# Patient Record
Sex: MALE | Race: WHITE | NOT HISPANIC OR LATINO
[De-identification: names, ages, dates, MRNs, and addresses within clinical notes are randomized per-mention and may not be internally consistent; named-entity substitution may affect disease eponyms.]

---

## 2017-10-04 ENCOUNTER — APPOINTMENT (OUTPATIENT)
Dept: DERMATOLOGY | Facility: CLINIC | Age: 82
End: 2017-10-04
Payer: MEDICARE

## 2017-10-04 PROBLEM — Z00.00 ENCOUNTER FOR PREVENTIVE HEALTH EXAMINATION: Status: ACTIVE | Noted: 2017-10-04

## 2017-10-04 PROCEDURE — 99213 OFFICE O/P EST LOW 20 MIN: CPT

## 2019-08-09 ENCOUNTER — EMERGENCY (EMERGENCY)
Facility: HOSPITAL | Age: 84
LOS: 1 days | Discharge: HOME | End: 2019-08-09
Attending: EMERGENCY MEDICINE
Payer: MEDICARE

## 2019-08-09 VITALS
HEART RATE: 59 BPM | WEIGHT: 192.9 LBS | OXYGEN SATURATION: 97 % | TEMPERATURE: 99 F | RESPIRATION RATE: 18 BRPM | HEIGHT: 69 IN

## 2019-08-09 DIAGNOSIS — Z95.1 PRESENCE OF AORTOCORONARY BYPASS GRAFT: Chronic | ICD-10-CM

## 2019-08-09 LAB
ALBUMIN SERPL ELPH-MCNC: 4.1 G/DL — SIGNIFICANT CHANGE UP (ref 3.3–5.2)
ALP SERPL-CCNC: 63 U/L — SIGNIFICANT CHANGE UP (ref 40–120)
ALT FLD-CCNC: 11 U/L — SIGNIFICANT CHANGE UP
ANION GAP SERPL CALC-SCNC: 14 MMOL/L — SIGNIFICANT CHANGE UP (ref 5–17)
APTT BLD: 29.4 SEC — SIGNIFICANT CHANGE UP (ref 27.5–36.3)
AST SERPL-CCNC: 20 U/L — SIGNIFICANT CHANGE UP
BASOPHILS # BLD AUTO: 0.08 K/UL — SIGNIFICANT CHANGE UP (ref 0–0.2)
BASOPHILS NFR BLD AUTO: 1 % — SIGNIFICANT CHANGE UP (ref 0–2)
BILIRUB SERPL-MCNC: 0.4 MG/DL — SIGNIFICANT CHANGE UP (ref 0.4–2)
BUN SERPL-MCNC: 24 MG/DL — HIGH (ref 8–20)
CALCIUM SERPL-MCNC: 9.1 MG/DL — SIGNIFICANT CHANGE UP (ref 8.6–10.2)
CHLORIDE SERPL-SCNC: 112 MMOL/L — HIGH (ref 98–107)
CO2 SERPL-SCNC: 22 MMOL/L — SIGNIFICANT CHANGE UP (ref 22–29)
CREAT SERPL-MCNC: 1.97 MG/DL — HIGH (ref 0.5–1.3)
EOSINOPHIL # BLD AUTO: 0.2 K/UL — SIGNIFICANT CHANGE UP (ref 0–0.5)
EOSINOPHIL NFR BLD AUTO: 2.4 % — SIGNIFICANT CHANGE UP (ref 0–6)
GLUCOSE SERPL-MCNC: 88 MG/DL — SIGNIFICANT CHANGE UP (ref 70–115)
HCT VFR BLD CALC: 40.6 % — SIGNIFICANT CHANGE UP (ref 39–50)
HGB BLD-MCNC: 13.2 G/DL — SIGNIFICANT CHANGE UP (ref 13–17)
IMM GRANULOCYTES NFR BLD AUTO: 0.5 % — SIGNIFICANT CHANGE UP (ref 0–1.5)
INR BLD: 0.97 RATIO — SIGNIFICANT CHANGE UP (ref 0.88–1.16)
LYMPHOCYTES # BLD AUTO: 1.58 K/UL — SIGNIFICANT CHANGE UP (ref 1–3.3)
LYMPHOCYTES # BLD AUTO: 19.2 % — SIGNIFICANT CHANGE UP (ref 13–44)
MAGNESIUM SERPL-MCNC: 2.2 MG/DL — SIGNIFICANT CHANGE UP (ref 1.6–2.6)
MCHC RBC-ENTMCNC: 30.7 PG — SIGNIFICANT CHANGE UP (ref 27–34)
MCHC RBC-ENTMCNC: 32.5 GM/DL — SIGNIFICANT CHANGE UP (ref 32–36)
MCV RBC AUTO: 94.4 FL — SIGNIFICANT CHANGE UP (ref 80–100)
MONOCYTES # BLD AUTO: 0.92 K/UL — HIGH (ref 0–0.9)
MONOCYTES NFR BLD AUTO: 11.2 % — SIGNIFICANT CHANGE UP (ref 2–14)
NEUTROPHILS # BLD AUTO: 5.4 K/UL — SIGNIFICANT CHANGE UP (ref 1.8–7.4)
NEUTROPHILS NFR BLD AUTO: 65.7 % — SIGNIFICANT CHANGE UP (ref 43–77)
OB PNL STL: NEGATIVE — SIGNIFICANT CHANGE UP
PHOSPHATE SERPL-MCNC: 3.5 MG/DL — SIGNIFICANT CHANGE UP (ref 2.4–4.7)
PLATELET # BLD AUTO: 209 K/UL — SIGNIFICANT CHANGE UP (ref 150–400)
POTASSIUM SERPL-MCNC: 4.7 MMOL/L — SIGNIFICANT CHANGE UP (ref 3.5–5.3)
POTASSIUM SERPL-SCNC: 4.7 MMOL/L — SIGNIFICANT CHANGE UP (ref 3.5–5.3)
PROT SERPL-MCNC: 6.6 G/DL — SIGNIFICANT CHANGE UP (ref 6.6–8.7)
PROTHROM AB SERPL-ACNC: 11.1 SEC — SIGNIFICANT CHANGE UP (ref 10–12.9)
RBC # BLD: 4.3 M/UL — SIGNIFICANT CHANGE UP (ref 4.2–5.8)
RBC # FLD: 13.4 % — SIGNIFICANT CHANGE UP (ref 10.3–14.5)
SODIUM SERPL-SCNC: 148 MMOL/L — HIGH (ref 135–145)
TROPONIN T SERPL-MCNC: <0.01 NG/ML — SIGNIFICANT CHANGE UP (ref 0–0.06)
WBC # BLD: 8.22 K/UL — SIGNIFICANT CHANGE UP (ref 3.8–10.5)
WBC # FLD AUTO: 8.22 K/UL — SIGNIFICANT CHANGE UP (ref 3.8–10.5)

## 2019-08-09 PROCEDURE — 70547 MR ANGIOGRAPHY NECK W/O DYE: CPT | Mod: 26

## 2019-08-09 PROCEDURE — 71046 X-RAY EXAM CHEST 2 VIEWS: CPT | Mod: 26

## 2019-08-09 PROCEDURE — 93010 ELECTROCARDIOGRAM REPORT: CPT

## 2019-08-09 PROCEDURE — 70544 MR ANGIOGRAPHY HEAD W/O DYE: CPT | Mod: 26,59

## 2019-08-09 PROCEDURE — 99218: CPT

## 2019-08-09 PROCEDURE — 70551 MRI BRAIN STEM W/O DYE: CPT | Mod: 26

## 2019-08-09 PROCEDURE — 93306 TTE W/DOPPLER COMPLETE: CPT | Mod: 26

## 2019-08-09 PROCEDURE — 70450 CT HEAD/BRAIN W/O DYE: CPT | Mod: 26

## 2019-08-09 RX ORDER — ATENOLOL 25 MG/1
25 TABLET ORAL
Refills: 0 | Status: DISCONTINUED | OUTPATIENT
Start: 2019-08-09 | End: 2019-08-15

## 2019-08-09 RX ORDER — ATORVASTATIN CALCIUM 80 MG/1
10 TABLET, FILM COATED ORAL AT BEDTIME
Refills: 0 | Status: DISCONTINUED | OUTPATIENT
Start: 2019-08-09 | End: 2019-08-15

## 2019-08-09 RX ORDER — MECLIZINE HCL 12.5 MG
25 TABLET ORAL ONCE
Refills: 0 | Status: COMPLETED | OUTPATIENT
Start: 2019-08-09 | End: 2019-08-09

## 2019-08-09 RX ORDER — LEVOTHYROXINE SODIUM 125 MCG
25 TABLET ORAL ONCE
Refills: 0 | Status: COMPLETED | OUTPATIENT
Start: 2019-08-09 | End: 2019-08-10

## 2019-08-09 RX ORDER — GLIMEPIRIDE 1 MG
4 TABLET ORAL
Refills: 0 | Status: DISCONTINUED | OUTPATIENT
Start: 2019-08-10 | End: 2019-08-15

## 2019-08-09 RX ORDER — ATENOLOL 25 MG/1
50 TABLET ORAL ONCE
Refills: 0 | Status: DISCONTINUED | OUTPATIENT
Start: 2019-08-09 | End: 2019-08-09

## 2019-08-09 RX ORDER — ASPIRIN/CALCIUM CARB/MAGNESIUM 324 MG
81 TABLET ORAL DAILY
Refills: 0 | Status: DISCONTINUED | OUTPATIENT
Start: 2019-08-10 | End: 2019-08-15

## 2019-08-09 RX ADMIN — Medication 25 MILLIGRAM(S): at 17:44

## 2019-08-09 RX ADMIN — ATORVASTATIN CALCIUM 10 MILLIGRAM(S): 80 TABLET, FILM COATED ORAL at 22:15

## 2019-08-09 NOTE — ED CDU PROVIDER INITIAL DAY NOTE - OBJECTIVE STATEMENT
83 y/o M with CAD (s/p CABG), HLD, hypothyroid and CKD (stage 3) presents complaining of dizziness. Pt reports upon waking up this morning at 6:30 am he felt dizzy immediately upon standing and states it has not resolved and he has not improved as the day progressed. He has had similar episodes but much more mild, lasting seconds, that resolve upon washing his face. He does not describe vertigo, but states that he feels unsteady on his feet and feels that he needs to hold on to things while he ambulates today. He also feels "foggy" in his head and feels that his vision is not clear today. Pt denies fevers, chills, nausea, vomiting, abdominal pain, but he does note that his stool today and yesterday was a little dark in color, but has a history of hemorrhoids, and does note some recent bleeding due to hemorrhoids. He has an extensive cardiac history, but does not follow with a cardiologist. Reports dizziness feels better currently, able to ambulate with steady gait. Per son, patient has a history of vertigo and was working outside in the yard for many hours yesterday which he believes may have  triggered today's symptoms.  PMD: Andres

## 2019-08-09 NOTE — ED PROVIDER NOTE - OBJECTIVE STATEMENT
Patient presents complaining of dizziness upon waking up this morning at 6:30 am this morning and felt dizzy immediately upon standing and states it has not resolved and he has not improved as the day progressed. He has had similar episodes but much more mild, lasting seconds, that resolve upon washing his face. He does not describe vertigo, but states that he feels unsteady on his feet and feels that he needs to hold on to things while he ambulates today. He also feels "foggy" in his head and feels that his vision is not clear today. He denies fevers, chills, nausea, vomiting, abdominal pain, but he does note that his stool today and yesterday was a little dark in color, but has a history of hemorrhoids, and does note some recent bleeding due to hemorrhoids. He has an extensive cardiac history, but does not follow with a cardiologist.  Per son, patient has a history of vertigo and was working outside in the yard for many hours yesterday which he believes may have  triggered today's symptoms.  PMD: Andres

## 2019-08-09 NOTE — ED PROVIDER NOTE - PHYSICAL EXAMINATION
Const: Awake, alert and oriented. In no acute distress. Well appearing.  HEENT: NC/AT. Moist mucous membranes.  Eyes: No scleral icterus. EOMI.  Neck:. Soft and supple. Full ROM without pain.  Cardiac: Regular rate and regular rhythm. +S1/S2. No murmurs. Peripheral pulses 2+ and symmetric. No LE edema.  Resp: Speaking in full sentences. No evidence of respiratory distress. No wheezes, rales or rhonchi.  Abd: Soft, non-tender, non-distended. Normal bowel sounds in all 4 quadrants. No guarding or rebound.  Rectal: Normal external rectum without lesions. + collapsed, nonbleeding, non-thrombosed 12 o'clock hemorrhoid. Normal tone. No stool in rectal vault, trace bloody mucus. No internal hemorrhoids appreciated. Normal rectal tone.   Back: Spine midline and non-tender. No CVAT.  Skin: No rashes, abrasions or lacerations.  Neuro: CN II-XII grossly in tact. Symmetrical smile. PERRL. EOMI. Bilateral and symmetric sensation of face. Tongue midline. Normal finger to nose. Normal heel to shin. Normal rapid alternating movements. No pronator drift. Ambulates with ataxic gait, holding onto wall and stretcher while walking. Sensation symmetrically intact bilateral upper and lower extremities. Reflexes 2+ bilaterally.

## 2019-08-09 NOTE — ED CDU PROVIDER INITIAL DAY NOTE - MEDICAL DECISION MAKING DETAILS
Patient with PMH CAD, CABG, vertigo, DM, HTN presents complaining of dizziness, will OBS for TTE, MRI head, MRA head/neck, neuro checks

## 2019-08-09 NOTE — ED PROVIDER NOTE - PMH
CKD (chronic kidney disease) stage 3, GFR 30-59 ml/min    Coronary artery disease involving native heart without angina pectoris, unspecified vessel or lesion type    Hyperlipidemia, unspecified hyperlipidemia type    Hypothyroidism, unspecified type

## 2019-08-09 NOTE — ED PROVIDER NOTE - CLINICAL SUMMARY MEDICAL DECISION MAKING FREE TEXT BOX
Patient with PMH CAD, CABG, vertigo, DM, HTN presents complaining of dizziness since waking up this morning, described as inability to walk steadily. Per son, he worked a lot outside in the yard yesterday. On exam, neurologically intact, except when ambulating, in that he is holding onto walls when ambulating. Concern for posterior circulation CVA vs. vertigo, but will evaluate for GIB as patient notes dark stool today. If labs and CT are WNL, will plan to place in observation for MRI to rule out posterior circulation CVA.

## 2019-08-09 NOTE — ED PROVIDER NOTE - NS ED ROS FT
Const: Denies fever, chills  HEENT: + blurry vision. Denies sore throat  Neck: Denies neck pain/stiffness  Resp: Denies coughing, SOB  Cardiovascular: Denies CP, palpitations, LE edema  GI: + blood in stool. Denies nausea, vomiting, abdominal pain, diarrhea, constipation  : Denies urinary frequency/urgency/dysuria, hematuria  MSK: Denies back pain  Neuro: + dizziness. Denies HA, numbness, weakness  Skin: Denies rashes.

## 2019-08-09 NOTE — ED ADULT NURSE REASSESSMENT NOTE - NS ED NURSE REASSESS COMMENT FT1
Assumed pt care at 1930. Pt sitting upright in stretcher in no apparent signs of distress with brother at bedside. Pt status unchanged, refer to flowsheet and chart, pt safety maintained, pt hemodynamically stable, updated on plan of care. Awaiting MRI. Call light provided, fall safety reinforced. Will continue to monitor

## 2019-08-10 VITALS
SYSTOLIC BLOOD PRESSURE: 167 MMHG | RESPIRATION RATE: 16 BRPM | TEMPERATURE: 98 F | HEART RATE: 58 BPM | DIASTOLIC BLOOD PRESSURE: 76 MMHG | OXYGEN SATURATION: 99 %

## 2019-08-10 LAB
APPEARANCE UR: CLEAR — SIGNIFICANT CHANGE UP
BILIRUB UR-MCNC: NEGATIVE — SIGNIFICANT CHANGE UP
COLOR SPEC: YELLOW — SIGNIFICANT CHANGE UP
DIFF PNL FLD: NEGATIVE — SIGNIFICANT CHANGE UP
EPI CELLS # UR: SIGNIFICANT CHANGE UP
GLUCOSE BLDC GLUCOMTR-MCNC: 66 MG/DL — LOW (ref 70–99)
GLUCOSE BLDC GLUCOMTR-MCNC: 67 MG/DL — LOW (ref 70–99)
GLUCOSE BLDC GLUCOMTR-MCNC: 74 MG/DL — SIGNIFICANT CHANGE UP (ref 70–99)
GLUCOSE UR QL: NEGATIVE MG/DL — SIGNIFICANT CHANGE UP
KETONES UR-MCNC: NEGATIVE — SIGNIFICANT CHANGE UP
LEUKOCYTE ESTERASE UR-ACNC: NEGATIVE — SIGNIFICANT CHANGE UP
NITRITE UR-MCNC: NEGATIVE — SIGNIFICANT CHANGE UP
PH UR: 6 — SIGNIFICANT CHANGE UP (ref 5–8)
PROT UR-MCNC: 30 MG/DL
SP GR SPEC: 1.02 — SIGNIFICANT CHANGE UP (ref 1.01–1.02)
TROPONIN T SERPL-MCNC: <0.01 NG/ML — SIGNIFICANT CHANGE UP (ref 0–0.06)
UROBILINOGEN FLD QL: NEGATIVE MG/DL — SIGNIFICANT CHANGE UP
VIT B12 SERPL-MCNC: 670 PG/ML — SIGNIFICANT CHANGE UP (ref 232–1245)

## 2019-08-10 PROCEDURE — 99284 EMERGENCY DEPT VISIT MOD MDM: CPT | Mod: 25

## 2019-08-10 PROCEDURE — G0378: CPT

## 2019-08-10 PROCEDURE — 36415 COLL VENOUS BLD VENIPUNCTURE: CPT

## 2019-08-10 PROCEDURE — 86618 LYME DISEASE ANTIBODY: CPT

## 2019-08-10 PROCEDURE — 85610 PROTHROMBIN TIME: CPT

## 2019-08-10 PROCEDURE — 70544 MR ANGIOGRAPHY HEAD W/O DYE: CPT

## 2019-08-10 PROCEDURE — 93005 ELECTROCARDIOGRAM TRACING: CPT

## 2019-08-10 PROCEDURE — 82272 OCCULT BLD FECES 1-3 TESTS: CPT

## 2019-08-10 PROCEDURE — 82962 GLUCOSE BLOOD TEST: CPT

## 2019-08-10 PROCEDURE — 85027 COMPLETE CBC AUTOMATED: CPT

## 2019-08-10 PROCEDURE — 93306 TTE W/DOPPLER COMPLETE: CPT

## 2019-08-10 PROCEDURE — 83735 ASSAY OF MAGNESIUM: CPT

## 2019-08-10 PROCEDURE — 82607 VITAMIN B-12: CPT

## 2019-08-10 PROCEDURE — 70547 MR ANGIOGRAPHY NECK W/O DYE: CPT

## 2019-08-10 PROCEDURE — 80053 COMPREHEN METABOLIC PANEL: CPT

## 2019-08-10 PROCEDURE — 70450 CT HEAD/BRAIN W/O DYE: CPT

## 2019-08-10 PROCEDURE — 71046 X-RAY EXAM CHEST 2 VIEWS: CPT

## 2019-08-10 PROCEDURE — 84100 ASSAY OF PHOSPHORUS: CPT

## 2019-08-10 PROCEDURE — 99217: CPT

## 2019-08-10 PROCEDURE — 85730 THROMBOPLASTIN TIME PARTIAL: CPT

## 2019-08-10 PROCEDURE — 70551 MRI BRAIN STEM W/O DYE: CPT

## 2019-08-10 PROCEDURE — 84484 ASSAY OF TROPONIN QUANT: CPT

## 2019-08-10 PROCEDURE — 81001 URINALYSIS AUTO W/SCOPE: CPT

## 2019-08-10 RX ORDER — DEXTROSE 50 % IN WATER 50 %
15 SYRINGE (ML) INTRAVENOUS ONCE
Refills: 0 | Status: COMPLETED | OUTPATIENT
Start: 2019-08-10 | End: 2019-08-10

## 2019-08-10 RX ORDER — MECLIZINE HCL 12.5 MG
1 TABLET ORAL
Qty: 30 | Refills: 0
Start: 2019-08-10 | End: 2019-08-19

## 2019-08-10 RX ADMIN — Medication 15 GRAM(S): at 08:52

## 2019-08-10 RX ADMIN — Medication 25 MICROGRAM(S): at 05:00

## 2019-08-10 NOTE — ED CDU PROVIDER DISPOSITION NOTE - CLINICAL COURSE
85 y/o M with CAD (s/p CABG and 5 stents apporx 8 years ago- last saw cardiology approx 4 years ago), HLD, hypothyroid and CKD (stage 3) presents complaining of dizziness. Pt reports upon waking up this morning at 6:30 am he felt dizzy immediately upon standing. Pt does reports 6 month h/o of feeling light headed upon standing, denies syncope. Pt was admitted top observation for MRI/MRA and ECHO. Trop negative, MRI/MRA with no acute concerns, ECHO results noted. Will dc home with out-pt Cardiology f/u

## 2019-08-10 NOTE — ED CDU PROVIDER SUBSEQUENT DAY NOTE - PHYSICAL EXAMINATION
Const: Awake, alert and oriented. In no acute distress. Well appearing.  HEENT: NC/AT. Moist mucous membranes.  Eyes: No scleral icterus. EOMI.  Neck:. Soft and supple. Full ROM without pain.  Cardiac: Regular rate and regular rhythm. +S1/S2. No murmurs. Peripheral pulses 2+ and symmetric.  Resp: Speaking in full sentences. No evidence of respiratory distress. No wheezes, rales or rhonchi.  Abd: Soft, non-tender, non-distended. Normal bowel sounds in all 4 quadrants. No guarding or rebound.  Back: Spine midline and non-tender. No CVAT.  Skin: No rashes, abrasions or lacerations.  Neuro: CN II-XII grossly in tact. Symmetrical smile. PERRL. EOMI. Bilateral and symmetric sensation of face. Tongue midline. Normal finger to nose. Normal heel to shin. Normal rapid alternating movements. No pronator drift. Ambulates with ataxic gait, holding onto wall and stretcher while walking. Sensation symmetrically intact bilateral upper and lower extremities. Reflexes 2+ bilaterally. Const: Awake, alert and oriented. In no acute distress. Well appearing.  HEENT: NC/AT. Moist mucous membranes.  Eyes: No scleral icterus. EOMI.  Neck:. Soft and supple. Full ROM without pain.  Cardiac: Regular rate and regular rhythm. +S1/S2. No murmurs. Peripheral pulses 2+ and symmetric.  Resp: Speaking in full sentences. No evidence of respiratory distress. No wheezes, rales or rhonchi.  Abd: Soft, non-tender, non-distended. Normal bowel sounds in all 4 quadrants. No guarding or rebound.  Back: Spine midline and non-tender. No CVAT.  Skin: No rashes, abrasions or lacerations.  Neuro: CN II-XII grossly in tact. Symmetrical smile. PERRL. EOMI. Bilateral and symmetric sensation of face. Tongue midline. Normal finger to nose. Normal heel to shin. Normal rapid alternating movements. No pronator drift. No ataxic gait. Sensation symmetrically intact bilateral upper and lower extremities. Reflexes 2+ bilaterally.

## 2019-08-10 NOTE — ED CDU PROVIDER SUBSEQUENT DAY NOTE - MEDICAL DECISION MAKING DETAILS
Patient with PMH CAD, CABG, vertigo, DM, HTN presents complaining of dizziness, will OBS for TTE, MRI head, MRA head/neck, neuro checks
Dizziness upon standing- MRI neg. MRA head neck neg. Will do another trop and ECHO pending. If neg will need out cardio f/u. Pt also with noted + Rhomberg, HA and Ataxia- will check lymes and B12

## 2019-08-10 NOTE — ED CDU PROVIDER SUBSEQUENT DAY NOTE - HISTORY
83 y/o M with CAD (s/p CABG and 5 stents apporx 8 years ago- last saw crdiology approx 4 years ago), HLD, hypothyroid and CKD (stage 3) presents complaining of dizziness. Pt reports upon waking up this morning at 6:30 am he felt dizzy immediately upon standing. Pt does reports 6 month h/o of feeling light headed upon standing, denies syncope. Pt was admitted top observation for MRI/MRA and ECHO. Pt seen this morning and reports feeling better. NO dizziness while sitting in stretcher. Denies currently HA, but reports HA intermittently x 1 year. Denies joint pain, rashes, fevers, CP, palp or SOB

## 2019-08-10 NOTE — ED ADULT NURSE NOTE - CHPI ED NUR SYMPTOMS NEG
no fever/no vomiting/no loss of consciousness/no nausea/no numbness/no blurred vision/no change in level of consciousness/no confusion/no weakness

## 2019-08-10 NOTE — ED ADULT NURSE REASSESSMENT NOTE - NS ED NURSE REASSESS COMMENT FT1
pt resting comfortably in bed no acute distress noted, offers no complaints at this time. vitals wnl, denies any dizziness at this time aox3 moving all extremities equal bilaterally.

## 2019-08-11 LAB
B BURGDOR C6 AB SER-ACNC: NEGATIVE — SIGNIFICANT CHANGE UP
B BURGDOR IGG+IGM SER-ACNC: 0.14 INDEX — SIGNIFICANT CHANGE UP (ref 0.01–0.89)

## 2020-06-16 NOTE — ED CDU PROVIDER INITIAL DAY NOTE - ATTENDING CONTRIBUTION TO CARE
I have personally performed a face to face diagnostic evaluation on this patient. I have reviewed the ACP note and agree with the history, exam and plan of care, except as noted. I agree with the plan of care above. Care managed by ACP under my direction and I was available for consultation as need.

## 2020-10-08 PROBLEM — E78.5 HYPERLIPIDEMIA, UNSPECIFIED: Chronic | Status: ACTIVE | Noted: 2019-08-09

## 2020-10-08 PROBLEM — N18.3 CHRONIC KIDNEY DISEASE, STAGE 3 (MODERATE): Chronic | Status: ACTIVE | Noted: 2019-08-09

## 2020-10-08 PROBLEM — I25.10 ATHEROSCLEROTIC HEART DISEASE OF NATIVE CORONARY ARTERY WITHOUT ANGINA PECTORIS: Chronic | Status: ACTIVE | Noted: 2019-08-09

## 2020-10-08 PROBLEM — E03.9 HYPOTHYROIDISM, UNSPECIFIED: Chronic | Status: ACTIVE | Noted: 2019-08-09

## 2020-10-09 ENCOUNTER — APPOINTMENT (OUTPATIENT)
Dept: DERMATOLOGY | Facility: CLINIC | Age: 85
End: 2020-10-09
Payer: MEDICARE

## 2020-10-09 PROCEDURE — 99203 OFFICE O/P NEW LOW 30 MIN: CPT

## 2020-11-03 ENCOUNTER — EMERGENCY (EMERGENCY)
Facility: HOSPITAL | Age: 85
LOS: 1 days | Discharge: HOME | End: 2020-11-03
Attending: EMERGENCY MEDICINE
Payer: MEDICARE

## 2020-11-03 VITALS
HEART RATE: 66 BPM | DIASTOLIC BLOOD PRESSURE: 87 MMHG | RESPIRATION RATE: 18 BRPM | WEIGHT: 179.9 LBS | HEIGHT: 69 IN | TEMPERATURE: 98 F | OXYGEN SATURATION: 96 % | SYSTOLIC BLOOD PRESSURE: 173 MMHG

## 2020-11-03 DIAGNOSIS — Z95.1 PRESENCE OF AORTOCORONARY BYPASS GRAFT: Chronic | ICD-10-CM

## 2020-11-03 LAB
ALBUMIN SERPL ELPH-MCNC: 3.9 G/DL — SIGNIFICANT CHANGE UP (ref 3.3–5.2)
ALP SERPL-CCNC: 69 U/L — SIGNIFICANT CHANGE UP (ref 40–120)
ALT FLD-CCNC: 11 U/L — SIGNIFICANT CHANGE UP
AMPHET UR-MCNC: NEGATIVE — SIGNIFICANT CHANGE UP
ANION GAP SERPL CALC-SCNC: 13 MMOL/L — SIGNIFICANT CHANGE UP (ref 5–17)
APPEARANCE UR: CLEAR — SIGNIFICANT CHANGE UP
APTT BLD: 31.3 SEC — SIGNIFICANT CHANGE UP (ref 27.5–35.5)
AST SERPL-CCNC: 24 U/L — SIGNIFICANT CHANGE UP
BACTERIA # UR AUTO: ABNORMAL
BARBITURATES UR SCN-MCNC: NEGATIVE — SIGNIFICANT CHANGE UP
BASOPHILS # BLD AUTO: 0.07 K/UL — SIGNIFICANT CHANGE UP (ref 0–0.2)
BASOPHILS NFR BLD AUTO: 0.8 % — SIGNIFICANT CHANGE UP (ref 0–2)
BENZODIAZ UR-MCNC: NEGATIVE — SIGNIFICANT CHANGE UP
BILIRUB SERPL-MCNC: 0.6 MG/DL — SIGNIFICANT CHANGE UP (ref 0.4–2)
BILIRUB UR-MCNC: NEGATIVE — SIGNIFICANT CHANGE UP
BUN SERPL-MCNC: 29 MG/DL — HIGH (ref 8–20)
CALCIUM SERPL-MCNC: 9.4 MG/DL — SIGNIFICANT CHANGE UP (ref 8.6–10.2)
CHLORIDE SERPL-SCNC: 105 MMOL/L — SIGNIFICANT CHANGE UP (ref 98–107)
CO2 SERPL-SCNC: 23 MMOL/L — SIGNIFICANT CHANGE UP (ref 22–29)
COCAINE METAB.OTHER UR-MCNC: NEGATIVE — SIGNIFICANT CHANGE UP
COLOR SPEC: YELLOW — SIGNIFICANT CHANGE UP
CREAT SERPL-MCNC: 2.18 MG/DL — HIGH (ref 0.5–1.3)
DIFF PNL FLD: NEGATIVE — SIGNIFICANT CHANGE UP
EOSINOPHIL # BLD AUTO: 0.24 K/UL — SIGNIFICANT CHANGE UP (ref 0–0.5)
EOSINOPHIL NFR BLD AUTO: 2.7 % — SIGNIFICANT CHANGE UP (ref 0–6)
EPI CELLS # UR: SIGNIFICANT CHANGE UP
GLUCOSE BLDC GLUCOMTR-MCNC: 75 MG/DL — SIGNIFICANT CHANGE UP (ref 70–99)
GLUCOSE BLDC GLUCOMTR-MCNC: 89 MG/DL — SIGNIFICANT CHANGE UP (ref 70–99)
GLUCOSE SERPL-MCNC: 81 MG/DL — SIGNIFICANT CHANGE UP (ref 70–99)
GLUCOSE UR QL: NEGATIVE MG/DL — SIGNIFICANT CHANGE UP
HCT VFR BLD CALC: 42 % — SIGNIFICANT CHANGE UP (ref 39–50)
HGB BLD-MCNC: 13.7 G/DL — SIGNIFICANT CHANGE UP (ref 13–17)
IMM GRANULOCYTES NFR BLD AUTO: 0.2 % — SIGNIFICANT CHANGE UP (ref 0–1.5)
INR BLD: 1.14 RATIO — SIGNIFICANT CHANGE UP (ref 0.88–1.16)
KETONES UR-MCNC: NEGATIVE — SIGNIFICANT CHANGE UP
LEUKOCYTE ESTERASE UR-ACNC: NEGATIVE — SIGNIFICANT CHANGE UP
LYMPHOCYTES # BLD AUTO: 1.41 K/UL — SIGNIFICANT CHANGE UP (ref 1–3.3)
LYMPHOCYTES # BLD AUTO: 16 % — SIGNIFICANT CHANGE UP (ref 13–44)
MCHC RBC-ENTMCNC: 29.8 PG — SIGNIFICANT CHANGE UP (ref 27–34)
MCHC RBC-ENTMCNC: 32.6 GM/DL — SIGNIFICANT CHANGE UP (ref 32–36)
MCV RBC AUTO: 91.5 FL — SIGNIFICANT CHANGE UP (ref 80–100)
METHADONE UR-MCNC: NEGATIVE — SIGNIFICANT CHANGE UP
MONOCYTES # BLD AUTO: 0.85 K/UL — SIGNIFICANT CHANGE UP (ref 0–0.9)
MONOCYTES NFR BLD AUTO: 9.7 % — SIGNIFICANT CHANGE UP (ref 2–14)
NEUTROPHILS # BLD AUTO: 6.21 K/UL — SIGNIFICANT CHANGE UP (ref 1.8–7.4)
NEUTROPHILS NFR BLD AUTO: 70.6 % — SIGNIFICANT CHANGE UP (ref 43–77)
NITRITE UR-MCNC: NEGATIVE — SIGNIFICANT CHANGE UP
OPIATES UR-MCNC: NEGATIVE — SIGNIFICANT CHANGE UP
PCP SPEC-MCNC: SIGNIFICANT CHANGE UP
PCP UR-MCNC: NEGATIVE — SIGNIFICANT CHANGE UP
PH UR: 6 — SIGNIFICANT CHANGE UP (ref 5–8)
PLATELET # BLD AUTO: 255 K/UL — SIGNIFICANT CHANGE UP (ref 150–400)
POTASSIUM SERPL-MCNC: 4.6 MMOL/L — SIGNIFICANT CHANGE UP (ref 3.5–5.3)
POTASSIUM SERPL-SCNC: 4.6 MMOL/L — SIGNIFICANT CHANGE UP (ref 3.5–5.3)
PROT SERPL-MCNC: 6.5 G/DL — LOW (ref 6.6–8.7)
PROT UR-MCNC: 15 MG/DL
PROTHROM AB SERPL-ACNC: 13.1 SEC — SIGNIFICANT CHANGE UP (ref 10.6–13.6)
RBC # BLD: 4.59 M/UL — SIGNIFICANT CHANGE UP (ref 4.2–5.8)
RBC # FLD: 13 % — SIGNIFICANT CHANGE UP (ref 10.3–14.5)
RBC CASTS # UR COMP ASSIST: SIGNIFICANT CHANGE UP /HPF (ref 0–4)
SARS-COV-2 RNA SPEC QL NAA+PROBE: SIGNIFICANT CHANGE UP
SODIUM SERPL-SCNC: 141 MMOL/L — SIGNIFICANT CHANGE UP (ref 135–145)
SP GR SPEC: 1.01 — SIGNIFICANT CHANGE UP (ref 1.01–1.02)
THC UR QL: NEGATIVE — SIGNIFICANT CHANGE UP
UROBILINOGEN FLD QL: NEGATIVE MG/DL — SIGNIFICANT CHANGE UP
WBC # BLD: 8.8 K/UL — SIGNIFICANT CHANGE UP (ref 3.8–10.5)
WBC # FLD AUTO: 8.8 K/UL — SIGNIFICANT CHANGE UP (ref 3.8–10.5)
WBC UR QL: SIGNIFICANT CHANGE UP

## 2020-11-03 PROCEDURE — 71250 CT THORAX DX C-: CPT | Mod: 26

## 2020-11-03 PROCEDURE — 99222 1ST HOSP IP/OBS MODERATE 55: CPT

## 2020-11-03 PROCEDURE — 71045 X-RAY EXAM CHEST 1 VIEW: CPT | Mod: 26

## 2020-11-03 PROCEDURE — 93010 ELECTROCARDIOGRAM REPORT: CPT

## 2020-11-03 PROCEDURE — 93306 TTE W/DOPPLER COMPLETE: CPT | Mod: 26

## 2020-11-03 PROCEDURE — 70490 CT SOFT TISSUE NECK W/O DYE: CPT | Mod: 26

## 2020-11-03 PROCEDURE — 70450 CT HEAD/BRAIN W/O DYE: CPT | Mod: 26

## 2020-11-03 PROCEDURE — 99220: CPT | Mod: CS

## 2020-11-03 RX ORDER — LEVOTHYROXINE SODIUM 125 MCG
25 TABLET ORAL DAILY
Refills: 0 | Status: DISCONTINUED | OUTPATIENT
Start: 2020-11-03 | End: 2020-11-07

## 2020-11-03 RX ORDER — ATENOLOL 25 MG/1
25 TABLET ORAL
Refills: 0 | Status: DISCONTINUED | OUTPATIENT
Start: 2020-11-03 | End: 2020-11-07

## 2020-11-03 RX ORDER — GLUCAGON INJECTION, SOLUTION 0.5 MG/.1ML
1 INJECTION, SOLUTION SUBCUTANEOUS ONCE
Refills: 0 | Status: DISCONTINUED | OUTPATIENT
Start: 2020-11-03 | End: 2020-11-07

## 2020-11-03 RX ORDER — SODIUM CHLORIDE 9 MG/ML
1000 INJECTION, SOLUTION INTRAVENOUS
Refills: 0 | Status: DISCONTINUED | OUTPATIENT
Start: 2020-11-03 | End: 2020-11-07

## 2020-11-03 RX ORDER — DEXTROSE 50 % IN WATER 50 %
25 SYRINGE (ML) INTRAVENOUS ONCE
Refills: 0 | Status: DISCONTINUED | OUTPATIENT
Start: 2020-11-03 | End: 2020-11-07

## 2020-11-03 RX ORDER — INSULIN LISPRO 100/ML
VIAL (ML) SUBCUTANEOUS
Refills: 0 | Status: DISCONTINUED | OUTPATIENT
Start: 2020-11-03 | End: 2020-11-07

## 2020-11-03 RX ORDER — ISOSORBIDE DINITRATE 5 MG/1
30 TABLET ORAL DAILY
Refills: 0 | Status: DISCONTINUED | OUTPATIENT
Start: 2020-11-03 | End: 2020-11-03

## 2020-11-03 RX ORDER — ISOSORBIDE MONONITRATE 60 MG/1
30 TABLET, EXTENDED RELEASE ORAL DAILY
Refills: 0 | Status: DISCONTINUED | OUTPATIENT
Start: 2020-11-03 | End: 2020-11-07

## 2020-11-03 RX ORDER — CARVEDILOL PHOSPHATE 80 MG/1
3.12 CAPSULE, EXTENDED RELEASE ORAL EVERY 12 HOURS
Refills: 0 | Status: DISCONTINUED | OUTPATIENT
Start: 2020-11-03 | End: 2020-11-03

## 2020-11-03 RX ORDER — ASPIRIN/CALCIUM CARB/MAGNESIUM 324 MG
162 TABLET ORAL DAILY
Refills: 0 | Status: DISCONTINUED | OUTPATIENT
Start: 2020-11-03 | End: 2020-11-07

## 2020-11-03 RX ORDER — DEXTROSE 50 % IN WATER 50 %
15 SYRINGE (ML) INTRAVENOUS ONCE
Refills: 0 | Status: DISCONTINUED | OUTPATIENT
Start: 2020-11-03 | End: 2020-11-07

## 2020-11-03 RX ORDER — ATORVASTATIN CALCIUM 80 MG/1
10 TABLET, FILM COATED ORAL AT BEDTIME
Refills: 0 | Status: DISCONTINUED | OUTPATIENT
Start: 2020-11-03 | End: 2020-11-07

## 2020-11-03 RX ORDER — LIDOCAINE 4 G/100G
5 CREAM TOPICAL ONCE
Refills: 0 | Status: COMPLETED | OUTPATIENT
Start: 2020-11-03 | End: 2020-11-03

## 2020-11-03 RX ORDER — DEXTROSE 50 % IN WATER 50 %
12.5 SYRINGE (ML) INTRAVENOUS ONCE
Refills: 0 | Status: DISCONTINUED | OUTPATIENT
Start: 2020-11-03 | End: 2020-11-07

## 2020-11-03 RX ADMIN — Medication 162 MILLIGRAM(S): at 18:55

## 2020-11-03 RX ADMIN — Medication 25 MICROGRAM(S): at 23:04

## 2020-11-03 RX ADMIN — ATORVASTATIN CALCIUM 10 MILLIGRAM(S): 80 TABLET, FILM COATED ORAL at 23:04

## 2020-11-03 RX ADMIN — LIDOCAINE 5 MILLILITER(S): 4 CREAM TOPICAL at 22:06

## 2020-11-03 NOTE — ED PROVIDER NOTE - OBJECTIVE STATEMENT
85M PMH CAD, CABG, vertigo, DM, HTN presents complaining of 2 weeks of confusion, vertigo, and lump in his throat.  Patient states he has not been feeling well since his flu shot 2 weeks ago, states he is not eating well and feels ill.  States he has lost weight over the year (used to weigh 245lbs).  He was evaluated for dizziness here 1 year ago with normal MR head and neck and normal TTE.  He lives alone with his wife who also has multiple medical problems.   He is ambulatory. Otherwise denies pain, bleeding, SOB (denies despite triage note), chest pain, abdominal pain or fevers.

## 2020-11-03 NOTE — ED ADULT NURSE REASSESSMENT NOTE - NS ED NURSE REASSESS COMMENT FT1
Assumed care I of the Pt at 1930. Verbal report received from PRICILA Easton. Pt is AOx4 and in no acute distress. Pt denies SOB and chest pain at this time. Pt states he came in feeling he had a "lump in his throat". Pt still complaining of feeling of lump in throat and over all feeling of tiredness. Pt is bradycardic on monitor with a normal Rhythm. Pt pending MRI. Wait time explained plan of care explained will continue to monitor.

## 2020-11-03 NOTE — ED CDU PROVIDER INITIAL DAY NOTE - ATTENDING CONTRIBUTION TO CARE
I agree with the PA's note and was available for any issues/concerns. I was directly involved in patient care. My brief overall assessment is as follows:    85 year old male PMHx CAD, CABG, vertigo, DM, HTN c/o malaise, confusion and transient dizziness for two weeks; initial work up WNL except for mildly elevated creatinine level; cardiology consult noted; patient pending ECHO and MRI

## 2020-11-03 NOTE — ED ADULT NURSE REASSESSMENT NOTE - NS ED NURSE REASSESS COMMENT FT1
Pt resting comfortably in bed. FS 89. Pt with no complaints at this time VSS will continue to monitor.

## 2020-11-03 NOTE — ED CDU PROVIDER INITIAL DAY NOTE - OBJECTIVE STATEMENT
85M PMH CAD, CABG, vertigo, DM, HTN presents complaining of 2 weeks of confusion, dizziness with lying down, and lump in his throat.  Patient states he has not been feeling well since his flu shot 2 weeks ago, states he is not eating well and feels ill.  States he has lost weight over the year (used to weigh 245lbs).  He was evaluated for dizziness here 1 year ago with normal MR head and neck and normal TTE.  He lives alone with his wife who also has multiple medical problems.   He is ambulatory. Otherwise denies pain, bleeding, SOB (denies despite triage note), chest pain, abdominal pain or fevers. Labs reviewed, has hx of renal insufficiency, cxr unremarkable, ct head, neck and chest without acute pathology.  Seen by Cradiology, TTe ordered

## 2020-11-03 NOTE — ED PROVIDER NOTE - NS ED ROS FT
General: No fever, no massive bleeding  Head: No HA, no ongoing scalp bleed  ENT: no neck pain, no sore throat, +lump in throat  Resp: No SOB, no hemoptysis  Cardiovascular: No CP, No LOC  GI: No abdominal pain, No blood in stool  : No dysuria, no hematuria   MSK: No back pain, no limb pain  Skin: No painful or bleeding lesions  Neuro: No paresthesias, No focal deficits

## 2020-11-03 NOTE — ED CDU PROVIDER INITIAL DAY NOTE - PROGRESS NOTE DETAILS
pt refusing MRI - told patient can give medication for sedation but is refusing this as well, pt is aware of risks of not getting MRI, hold patient to AM for re-assessment by cardiology, repeat lab work

## 2020-11-03 NOTE — ED CLERICAL - CLERICAL COMMENTS
call placed to [facility] for consult call placed to [facility] for consult. Dr. Yee called for consult

## 2020-11-03 NOTE — ED PROVIDER NOTE - ATTENDING CONTRIBUTION TO CARE
Dr. Smith : I have personally seen and examined this patient at the bedside. I have fully participated in the care of this patient. I have reviewed all pertinent clinical information, including history, physical exam, plan and the Resident's note and agree except as noted.     85M PMH CAD, CABG, vertigo, DM, HTN presents complaining of 2 weeks of confusion and feeling a lump to the back of his throat. notes felt a lump to his throat sp getting a flu shot tried over the counter flonase no relieve. notes that yesterday felt chest pressure for 30 minutes to left side and mild palpitations today after an antihistamin nasal spray. notes that has also been more forgetful lately. lost 65 lbs over 1 year but 15 over last few weeks as lack of appetite. hx of cabg but has not seen a cardiologist over years now - does not remember who it was. despite triage note denies sob. poor historian.        Denies f/c/n/v/cp/sob/palpitations/cough/abd.pain/d/c/dysuria/hematuria. no sick contacts/recent travel.    PE:  head; atraumatic normocephalic  eyes: perrla  Heart: rrr s1s2  lungs: ctab  abd: soft, nt nd + bs no rebound/guarding no cva ttp  le: no swelling no calf ttp  back: no midline cervical/thoracic/lumbar ttp      --> will fu ct head/neck/ chest eval for mass/ head bleed - possibly first signs of dementia; cardiology consult [facility] as notes cp and hx of  cabg--[facility] cards evaluated recommend echo/stress;  - note that pt saw Dr. Yee cardiology few years ago so will call his group instead of [facility]

## 2020-11-03 NOTE — CONSULT NOTE ADULT - SUBJECTIVE AND OBJECTIVE BOX
KUMAR CASTILLO  [id number]      HPI:  Kumar Castillo is an 85 year old man with past medical history of Coronary artery disease (s/p multiple PCIs back in 1990s & CABG x4 in 2011) Hypertension, Hyperlipidemia, Diabetes mellitus who presents with       ALLERGIES:  iodine (Anaphylaxis)      PAST MEDICAL & SURGICAL HISTORY:  Coronary artery disease (s/p multiple PCIs back in 1990s & CABG x4 in 2011)  Hypertension  Hyperlipidemia  Diabetes mellitus  Hypothyroidism      ROS:  All 10 systems reviewed and positives noted in HPI    OBJECTIVE:    VITAL SIGNS:  Vital Signs Last 24 Hrs  T(C): 37 (03 Nov 2020 11:41), Max: 37 (03 Nov 2020 11:41)  T(F): 98.6 (03 Nov 2020 11:41), Max: 98.6 (03 Nov 2020 11:41)  HR: 57 (03 Nov 2020 11:41) (57 - 66)  BP: 145/76 (03 Nov 2020 11:41) (145/76 - 173/87)  BP(mean): --  RR: 18 (03 Nov 2020 11:41) (18 - 18)  SpO2: 98% (03 Nov 2020 11:41) (96% - 98%)    PHYSICAL EXAM:  General: well appearing, no distress  HEENT: sclera anicteric  Neck: supple, no carotid bruits b/l  CVS: JVP ~ 7 cm H20, RRR, s1, s2, no murmurs/rubs/gallops  Chest: unlabored respirations, clear to auscultation b/l  Abdomen: non-distended  Extremities: no lower extremity edema b/l  Neuro: awake, alert & oriented x 3  Psych: normal affect      LABS:                        13.7   8.80  )-----------( 255      ( 03 Nov 2020 13:04 )             42.0     11-03    141  |  105  |  29.0<H>  ----------------------------<  81  4.6   |  23.0  |  2.18<H>    Ca    9.4      03 Nov 2020 13:04  Mg     2.3     11-03    TPro  6.5<L>  /  Alb  3.9  /  TBili  0.6  /  DBili  x   /  AST  24  /  ALT  11  /  AlkPhos  69  11-03    CARDIAC MARKERS ( 03 Nov 2020 13:04 )  x     / <0.01 ng/mL / x     / x     / x          PT/INR - ( 03 Nov 2020 13:04 )   PT: 13.1 sec;   INR: 1.14 ratio         PTT - ( 03 Nov 2020 13:04 )  PTT:31.3 sec      ECG (11/3/2020): normal sinus rhythm, first degree AV block, left axis deviation, nonspecific ST/T wave abnormalities      TTE: no priors    Chest xray (11/3/2020):  IMPRESSION:   No evidence of active chest disease.       KUMRA CASTILLO  [id number]      HPI:  Kumar Castillo is an 85 year old man with past medical history of Coronary artery disease (s/p multiple PCIs back in 1990s & CABG x4 in 2011) Hypertension, Hyperlipidemia, Diabetes mellitus who presents with difficulty swallowing. The patient states that he received his flu vaccine on 10/15 and since then has noticed a sensation of "lump in throat" and has had difficulty and discomfort with swallowing. Denies having this problem in the past with the flu vaccine. Denies having these symptoms with prior CABG, he reports with his CABG he had significant dyspnea and fatigue. Reports that due to this he has lost about ~ 15 lbs. Denies hematemesis or melena. Otherwise, reports that he is fairly active, does housework and chores and does not experience angina or dyspnea. Denies leg edema. Denies palpitations or syncope. Reports becoming more forgetful lately but is currently oriented x 3 on physical exam.       ALLERGIES:  iodine (Anaphylaxis)      PAST MEDICAL & SURGICAL HISTORY:  Coronary artery disease (s/p multiple PCIs back in 1990s & CABG x4 in 2011)  Hypertension  Hyperlipidemia  Diabetes mellitus  Hypothyroidism      ROS:  All 10 systems reviewed and positives noted in HPI    OBJECTIVE:    VITAL SIGNS:  Vital Signs Last 24 Hrs  T(C): 37 (03 Nov 2020 11:41), Max: 37 (03 Nov 2020 11:41)  T(F): 98.6 (03 Nov 2020 11:41), Max: 98.6 (03 Nov 2020 11:41)  HR: 57 (03 Nov 2020 11:41) (57 - 66)  BP: 145/76 (03 Nov 2020 11:41) (145/76 - 173/87)  BP(mean): --  RR: 18 (03 Nov 2020 11:41) (18 - 18)  SpO2: 98% (03 Nov 2020 11:41) (96% - 98%)    PHYSICAL EXAM:  General: elderly man, no acute distress  HEENT: sclera anicteric  Neck: supple, no carotid bruits b/l  CVS: JVP ~ 7 cm H20, RRR, s1, s2, no murmurs/rubs/gallops  Chest: unlabored respirations, clear to auscultation b/l  Abdomen: non-distended  Extremities: no lower extremity edema b/l  Neuro: awake, alert & oriented x 3  Psych: normal affect      LABS:                        13.7   8.80  )-----------( 255      ( 03 Nov 2020 13:04 )             42.0     11-03    141  |  105  |  29.0<H>  ----------------------------<  81  4.6   |  23.0  |  2.18<H>    Ca    9.4      03 Nov 2020 13:04  Mg     2.3     11-03    TPro  6.5<L>  /  Alb  3.9  /  TBili  0.6  /  DBili  x   /  AST  24  /  ALT  11  /  AlkPhos  69  11-03    CARDIAC MARKERS ( 03 Nov 2020 13:04 )  x     / <0.01 ng/mL / x     / x     / x          PT/INR - ( 03 Nov 2020 13:04 )   PT: 13.1 sec;   INR: 1.14 ratio         PTT - ( 03 Nov 2020 13:04 )  PTT:31.3 sec      ECG (11/3/2020): normal sinus rhythm, first degree AV block, left axis deviation, nonspecific ST/T wave abnormalities      TTE: no priors    Chest xray (11/3/2020):  IMPRESSION:   No evidence of active chest disease.

## 2020-11-03 NOTE — ED ADULT TRIAGE NOTE - SPO2 (%)
Virtual Regular Visit    Problem List Items Addressed This Visit        Respiratory    Acute rhinitis       Other    Cough - Primary               Reason for visit is to discuss cough and nasal congestion    Encounter provider Lizeth Pennington MD    Provider located at [address] [place] [phone]      Recent Visits  No visits were found meeting these conditions  Showing recent visits within past 7 days and meeting all other requirements     Future Appointments  No visits were found meeting these conditions  Showing future appointments within next 150 days and meeting all other requirements        After connecting through [employer], the patient was identified by name and date of birth  Rosalva Frances was informed that this is a telemedicine visit and that the visit is being conducted through telephone which may not be secure and therefore, might not be HIPAA-compliant  My office door was closed  No one else was in the room  He acknowledged consent and understanding of privacy and security of the video platform  The patient has agreed to participate and understands they can discontinue the visit at any time  Subjective  Rosalva Frances is a 52 y o  male still complaining of stuffy nose mild cough  No fever chills no nausea vomiting  No exposure to sick people  About few weeks ago he was diagnosed with viral URI and the symptoms got better but still lingering cough is there         Past Medical History:   Diagnosis Date    Back pain     Poison ivy        History reviewed  No pertinent surgical history      Current Outpatient Medications   Medication Sig Dispense Refill    Albuterol Sulfate (ProAir RespiClick) 926 (90 Base) MCG/ACT AEPB Inhale 1 puff 4 (four) times a day as needed (wheezing or cough) 1 each 0    cyclobenzaprine (FLEXERIL) 5 mg tablet Take 1-2 tablets at twice a day as needed for muscle spasms  Do not take while operating heavy machinery or exercising  30 tablet 1    benzonatate (TESSALON) 200 MG capsule Take 1 capsule (200 mg total) by mouth 3 (three) times a day as needed for cough (Patient not taking: Reported on 3/25/2020) 20 capsule 0     No current facility-administered medications for this visit  Allergies   Allergen Reactions    Poison Ivy Extract        Review of Systems   Positive for dry cough  No shortness of breath  Mild nasal congestion  No fever no chills and rest of the review of the systems are negative    1  Post URI cough  Advised to use over-the-counter Flonase nasal spray  Mucinex twice a day  Gargle with salt water and drink plenty of fluid and physical rest     If no improvement in symptom next 4-5 days he will call us back  I spent 15 minutes with the patient during this visit  96

## 2020-11-03 NOTE — ED PROVIDER NOTE - PHYSICAL EXAMINATION
General: NAD, elderly appearing  HEENT: Normocephalic, EOM intact, throat nml  Neck: No apparent stiffness or JVD  Pulm: Chest wall symmetric and nontender, lungs clear to ascultation   Cardiac: Regular rate and regular rhythm  Abdomen: Nontender and nondistended  Skin: Skin in warm, dry and intact without rashes or lesions.  Neuro: No apparent motor or sensory deficits, ambulatory, no ataxia  Psych: Alert, oriented, and cooperative

## 2020-11-03 NOTE — ED PROVIDER NOTE - CLINICAL SUMMARY MEDICAL DECISION MAKING FREE TEXT BOX
85M PMH CAD, CABG, vertigo, DM, HTN presents complaining of 2 weeks of confusion, vertigo, and lump in his throat.  Will order CT head, labs, EKG.

## 2020-11-03 NOTE — CONSULT NOTE ADULT - ASSESSMENT
Assessment:      Recommendations:   Assessment:  Kumar Gannon is an 85 year old man with past medical history of Coronary artery disease (s/p multiple PCIs back in 1990s & CABG x4 in 2011) Hypertension, Hyperlipidemia, Diabetes mellitus who presents with difficulty swallowing. The patient primary issue appears to be a sensation of lump in his throat and given significant weight loss would recommend Gastroenterology consult. On exam, he is not in decompensated heart failure. ECG with no acute ischemic ST/T wave changes, troponin also negative, less concern for acute ischemic process. Patient reports a fairly stable exercise tolerance and is not reporting a clear history of chest discomfort or dyspnea to me on interview.    Recommendations:  [] Lump sensation in neck and weight loss: Recommend Gastroenterology evaluation, patient also is former cigarette smoker  [] Reports of dyspnea/chest discomfort: On interview, patient not clearly expressing these symptoms but reported to other providers. Will plan to start with echocardiogram first and try to obtain collateral information from family. May ultimately benefit from nuclear stress test as well. Home CV meds are unclear at this time, given CAD history patient would benefit from aspirin, statin. Would start Carvedilol 3.125 mg BID for elevated BP.  [] Elevated Cr: May be some underlying CKD, patient has not followed recently in cardiology office since 2017, would trend Cr, avoid nephrotoxic medications    Thank you for the consult. We will follow along.    Charles Lagos MD  Cardiology

## 2020-11-03 NOTE — ED CDU PROVIDER INITIAL DAY NOTE - MEDICAL DECISION MAKING DETAILS
TTE as per cardiology, will order MRI to r/o acute cva.  I s/w wife and obtained correct medications, I d/c carvedilol as I put in his regular medications

## 2020-11-03 NOTE — ED CDU PROVIDER INITIAL DAY NOTE - PMH
CKD (chronic kidney disease) stage 3, GFR 30-59 ml/min    Coronary artery disease involving native heart without angina pectoris, unspecified vessel or lesion type    Diabetes    HTN (hypertension)    Hyperlipidemia, unspecified hyperlipidemia type    Hypothyroidism, unspecified type

## 2020-11-03 NOTE — ED ADULT NURSE NOTE - OBJECTIVE STATEMENT
patient c/o lump in the back of throat, patient stated able to eat, and drink. no difficulty breathing. patient c/o lump in the back of throat, patient stated able to eat, and drink. no difficulty breathing. as per wife that brought him in stating that he had an episode of confusion. patient acknowledge episode. patient poor historian with medical hx.

## 2020-11-03 NOTE — ED ADULT NURSE NOTE - CHIEF COMPLAINT QUOTE
Patient arrived to ED today from [facility] with c/o onset of confusion for about a week, elevated blood glucose, SOB, and HTN.  Patient states that he has a "lump" to his throat and also possibly sinusitis.  Patient states 8 days ago he started to have Rx for Flonase and saline.

## 2020-11-03 NOTE — ED PROVIDER NOTE - PROGRESS NOTE DETAILS
Fidel: Spoke with wife on phone who states patient has been feeling ill and more confused slightly.  He has not seen cardiology for years. Workup unremarkable thus far including CT and labs.  Per cardiology patient will need Echo, sent to Observation awaiting further cardiology workup.

## 2020-11-03 NOTE — PHARMACOTHERAPY INTERVENTION NOTE - COMMENTS
Called pharmacy to obtain medication list.
Called outpt pharmacy to confirm dosage form. As per pharmacy, patient fills isosorbide mononitrate ER 30 mG qday. Recommended ordering mononitrate ER formulation.

## 2020-11-03 NOTE — ED ADULT TRIAGE NOTE - CHIEF COMPLAINT QUOTE
Patient arrived to ED today from [facility] with c/o onset of confusion for about a week, elevated blood glucose, SOB, and HTN.  Patient states that he has a "lump" to his throat and also possibly sinusitis.  Patient states 8 days ago he started to have Rx for Flonase and saline.

## 2020-11-04 VITALS
DIASTOLIC BLOOD PRESSURE: 81 MMHG | SYSTOLIC BLOOD PRESSURE: 175 MMHG | TEMPERATURE: 98 F | HEART RATE: 63 BPM | OXYGEN SATURATION: 100 % | RESPIRATION RATE: 18 BRPM

## 2020-11-04 LAB
A1C WITH ESTIMATED AVERAGE GLUCOSE RESULT: 5.4 % — SIGNIFICANT CHANGE UP (ref 4–5.6)
ANION GAP SERPL CALC-SCNC: 14 MMOL/L — SIGNIFICANT CHANGE UP (ref 5–17)
BUN SERPL-MCNC: 26 MG/DL — HIGH (ref 8–20)
CALCIUM SERPL-MCNC: 8.6 MG/DL — SIGNIFICANT CHANGE UP (ref 8.6–10.2)
CHLORIDE SERPL-SCNC: 102 MMOL/L — SIGNIFICANT CHANGE UP (ref 98–107)
CO2 SERPL-SCNC: 19 MMOL/L — LOW (ref 22–29)
CREAT SERPL-MCNC: 2.04 MG/DL — HIGH (ref 0.5–1.3)
ESTIMATED AVERAGE GLUCOSE: 108 MG/DL — SIGNIFICANT CHANGE UP (ref 68–114)
GLUCOSE BLDC GLUCOMTR-MCNC: 104 MG/DL — HIGH (ref 70–99)
GLUCOSE BLDC GLUCOMTR-MCNC: 115 MG/DL — HIGH (ref 70–99)
GLUCOSE BLDC GLUCOMTR-MCNC: 132 MG/DL — HIGH (ref 70–99)
GLUCOSE BLDC GLUCOMTR-MCNC: 141 MG/DL — HIGH (ref 70–99)
GLUCOSE BLDC GLUCOMTR-MCNC: 144 MG/DL — HIGH (ref 70–99)
GLUCOSE BLDC GLUCOMTR-MCNC: 149 MG/DL — HIGH (ref 70–99)
GLUCOSE BLDC GLUCOMTR-MCNC: 159 MG/DL — HIGH (ref 70–99)
GLUCOSE BLDC GLUCOMTR-MCNC: 359 MG/DL — HIGH (ref 70–99)
GLUCOSE BLDC GLUCOMTR-MCNC: 56 MG/DL — LOW (ref 70–99)
GLUCOSE BLDC GLUCOMTR-MCNC: 57 MG/DL — LOW (ref 70–99)
GLUCOSE BLDC GLUCOMTR-MCNC: 57 MG/DL — LOW (ref 70–99)
GLUCOSE BLDC GLUCOMTR-MCNC: 70 MG/DL — SIGNIFICANT CHANGE UP (ref 70–99)
GLUCOSE BLDC GLUCOMTR-MCNC: 83 MG/DL — SIGNIFICANT CHANGE UP (ref 70–99)
GLUCOSE SERPL-MCNC: 93 MG/DL — SIGNIFICANT CHANGE UP (ref 70–99)
POTASSIUM SERPL-MCNC: 4.5 MMOL/L — SIGNIFICANT CHANGE UP (ref 3.5–5.3)
POTASSIUM SERPL-SCNC: 4.5 MMOL/L — SIGNIFICANT CHANGE UP (ref 3.5–5.3)
SODIUM SERPL-SCNC: 135 MMOL/L — SIGNIFICANT CHANGE UP (ref 135–145)

## 2020-11-04 PROCEDURE — 70490 CT SOFT TISSUE NECK W/O DYE: CPT

## 2020-11-04 PROCEDURE — 82962 GLUCOSE BLOOD TEST: CPT

## 2020-11-04 PROCEDURE — 71045 X-RAY EXAM CHEST 1 VIEW: CPT

## 2020-11-04 PROCEDURE — 99284 EMERGENCY DEPT VISIT MOD MDM: CPT

## 2020-11-04 PROCEDURE — 99232 SBSQ HOSP IP/OBS MODERATE 35: CPT

## 2020-11-04 PROCEDURE — 99217: CPT | Mod: CS

## 2020-11-04 PROCEDURE — 87086 URINE CULTURE/COLONY COUNT: CPT

## 2020-11-04 PROCEDURE — 85610 PROTHROMBIN TIME: CPT

## 2020-11-04 PROCEDURE — 85730 THROMBOPLASTIN TIME PARTIAL: CPT

## 2020-11-04 PROCEDURE — 84484 ASSAY OF TROPONIN QUANT: CPT

## 2020-11-04 PROCEDURE — 93005 ELECTROCARDIOGRAM TRACING: CPT

## 2020-11-04 PROCEDURE — 71250 CT THORAX DX C-: CPT

## 2020-11-04 PROCEDURE — 83036 HEMOGLOBIN GLYCOSYLATED A1C: CPT

## 2020-11-04 PROCEDURE — 80053 COMPREHEN METABOLIC PANEL: CPT

## 2020-11-04 PROCEDURE — 99284 EMERGENCY DEPT VISIT MOD MDM: CPT | Mod: 25

## 2020-11-04 PROCEDURE — 93307 TTE W/O DOPPLER COMPLETE: CPT

## 2020-11-04 PROCEDURE — 83735 ASSAY OF MAGNESIUM: CPT

## 2020-11-04 PROCEDURE — 85025 COMPLETE CBC W/AUTO DIFF WBC: CPT

## 2020-11-04 PROCEDURE — 36415 COLL VENOUS BLD VENIPUNCTURE: CPT

## 2020-11-04 PROCEDURE — 80307 DRUG TEST PRSMV CHEM ANLYZR: CPT

## 2020-11-04 PROCEDURE — G0378: CPT

## 2020-11-04 PROCEDURE — 70450 CT HEAD/BRAIN W/O DYE: CPT

## 2020-11-04 PROCEDURE — 80048 BASIC METABOLIC PNL TOTAL CA: CPT

## 2020-11-04 PROCEDURE — U0003: CPT

## 2020-11-04 PROCEDURE — 81001 URINALYSIS AUTO W/SCOPE: CPT

## 2020-11-04 RX ORDER — DEXTROSE 50 % IN WATER 50 %
15 SYRINGE (ML) INTRAVENOUS ONCE
Refills: 0 | Status: COMPLETED | OUTPATIENT
Start: 2020-11-04 | End: 2020-11-04

## 2020-11-04 RX ORDER — DEXTROSE 50 % IN WATER 50 %
50 SYRINGE (ML) INTRAVENOUS ONCE
Refills: 0 | Status: DISCONTINUED | OUTPATIENT
Start: 2020-11-04 | End: 2020-11-04

## 2020-11-04 RX ORDER — PANTOPRAZOLE SODIUM 20 MG/1
1 TABLET, DELAYED RELEASE ORAL
Qty: 30 | Refills: 0
Start: 2020-11-04 | End: 2020-12-03

## 2020-11-04 RX ORDER — PANTOPRAZOLE SODIUM 20 MG/1
40 TABLET, DELAYED RELEASE ORAL
Refills: 0 | Status: DISCONTINUED | OUTPATIENT
Start: 2020-11-04 | End: 2020-11-07

## 2020-11-04 RX ORDER — DEXTROSE 50 % IN WATER 50 %
12.5 SYRINGE (ML) INTRAVENOUS ONCE
Refills: 0 | Status: COMPLETED | OUTPATIENT
Start: 2020-11-04 | End: 2020-11-04

## 2020-11-04 RX ORDER — INSULIN HUMAN 100 [IU]/ML
6 INJECTION, SOLUTION SUBCUTANEOUS ONCE
Refills: 0 | Status: COMPLETED | OUTPATIENT
Start: 2020-11-04 | End: 2020-11-04

## 2020-11-04 RX ADMIN — ISOSORBIDE MONONITRATE 30 MILLIGRAM(S): 60 TABLET, EXTENDED RELEASE ORAL at 12:01

## 2020-11-04 RX ADMIN — Medication 12.5 GRAM(S): at 02:03

## 2020-11-04 RX ADMIN — Medication 162 MILLIGRAM(S): at 12:01

## 2020-11-04 RX ADMIN — INSULIN HUMAN 6 UNIT(S): 100 INJECTION, SOLUTION SUBCUTANEOUS at 02:53

## 2020-11-04 RX ADMIN — ATENOLOL 25 MILLIGRAM(S): 25 TABLET ORAL at 09:02

## 2020-11-04 RX ADMIN — Medication 15 GRAM(S): at 07:34

## 2020-11-04 RX ADMIN — PANTOPRAZOLE SODIUM 40 MILLIGRAM(S): 20 TABLET, DELAYED RELEASE ORAL at 12:01

## 2020-11-04 RX ADMIN — Medication 15 GRAM(S): at 02:02

## 2020-11-04 NOTE — CONSULT NOTE ADULT - ASSESSMENT
Overall, this globus sensation is likely secondary to a post nasal drip, but I was not able to visualize his oropharynx.  This may be a manifestation of GERD.  No concerning findings on CT chest.  Recommend starting pantoprazole 40 mg daily, feeding and have him see me as an outpatient in the office in the next 2-3 weeks.    Thank you for the consult.  Please do not hesitate to call with any questions or concerns.  GI will sign off.  Please reconsult as needed.    LUIS Vazquez MD  [facility] Physician [facility]  Division of Gastroenterology  Tel [phone]  Fax [phone]  11-04-20 @ 11:35

## 2020-11-04 NOTE — ED ADULT NURSE REASSESSMENT NOTE - NS ED NURSE REASSESS COMMENT FT1
Pt discharge instructions given and understood by patient , no distress noted , pt alert and oriented times three, IV removed prior to discharge , VSS, wife Camryn called and advised about husbands discharge home, pt instructed to wait in the waiting room for

## 2020-11-04 NOTE — ED CDU PROVIDER DISPOSITION NOTE - CARE PROVIDER_API CALL
JELLY Vazquez)  Internal Medicine  [address] [facility], [address] [place] [address]  Phone: [phone]  Fax: [phone]  Follow Up Time:     Edmar Lagos)  Internal Medicine  [address] [place] [address]  Phone: [phone]  Follow Up Time:

## 2020-11-04 NOTE — ED CDU PROVIDER DISPOSITION NOTE - NSFOLLOWUPINSTRUCTIONS_ED_ALL_ED_FT
- Follow up with your doctor within 2-3 days.   - Return to the ED for any new or worsening symptoms.   - Follow-up with GI Dr. Vazquez in office in 2-3 weeks  - Take protonix as directed, 1 tab daily with breakfast  - Follow-up with Cardiologist within 2-3 weeks for further evaluation.  - You will be called with Neurology appointment for further evaluation of symptoms

## 2020-11-04 NOTE — PROGRESS NOTE ADULT - SUBJECTIVE AND OBJECTIVE BOX
MARIA CASTILLO  [id number]      Chief Complaint: Difficulty swallowing    Interval History: The patient reports having persistent "lump" sensation in his throat. Denies angina or dyspnea.    Tele: normal sinus rhythm     Current meds:   aspirin enteric coated 162 milliGRAM(s) Oral daily  ATENolol  Tablet 25 milliGRAM(s) Oral two times a day  atorvastatin 10 milliGRAM(s) Oral at bedtime  dextrose 40% Gel 15 Gram(s) Oral once PRN  dextrose 5%. 1000 milliLiter(s) IV Continuous <Continuous>  dextrose 50% Injectable 12.5 Gram(s) IV Push once  dextrose 50% Injectable 25 Gram(s) IV Push once  dextrose 50% Injectable 25 Gram(s) IV Push once  glucagon  Injectable 1 milliGRAM(s) IntraMuscular once PRN  insulin lispro (ADMELOG) corrective regimen sliding scale   SubCutaneous three times a day before meals  isosorbide   mononitrate ER Tablet (IMDUR) 30 milliGRAM(s) Oral daily  levothyroxine 25 MICROGram(s) Oral daily  pantoprazole    Tablet 40 milliGRAM(s) Oral before breakfast      Objective:     Vital Signs:   T(C): 36.7 (11-04-20 @ 11:01), Max: 36.9 (11-03-20 @ 23:06)  HR: 63 (11-04-20 @ 11:01) (56 - 67)  BP: 175/81 (11-04-20 @ 11:01) (156/67 - 190/74)  RR: 18 (11-04-20 @ 11:01) (16 - 18)  SpO2: 100% (11-04-20 @ 11:01) (99% - 100%)  Wt(kg): --      PHYSICAL EXAM:  General: elderly man, no acute distress  HEENT: sclera anicteric  Neck: supple  CVS: JVP ~ 7 cm H20, RRR, s1, s2, no murmurs/rubs/gallops  Chest: unlabored respirations, clear to auscultation b/l  Abdomen: non-distended  Extremities: no lower extremity edema b/l  Neuro: awake, alert & oriented x 3  Psych: normal affect      Labs:   04 Nov 2020 05:28    135    |  102    |  26.0   ----------------------------<  93     4.5     |  19.0   |  2.04     Ca    8.6        04 Nov 2020 05:28  Mg     2.3       03 Nov 2020 13:04    TPro  6.5    /  Alb  3.9    /  TBili  0.6    /  DBili  x      /  AST  24     /  ALT  11     /  AlkPhos  69     03 Nov 2020 13:04                          13.7   8.80  )-----------( 255      ( 03 Nov 2020 13:04 )             42.0     PT/INR - ( 03 Nov 2020 13:04 )   PT: 13.1 sec;   INR: 1.14 ratio         PTT - ( 03 Nov 2020 13:04 )  PTT:31.3 sec  CARDIAC MARKERS ( 03 Nov 2020 13:04 )  x     / <0.01 ng/mL / x     / x     / x            ECG (11/3/2020): normal sinus rhythm, first degree AV block, left axis deviation, nonspecific ST/T wave abnormalities      TTE (11/3/2020):   1. Left ventricular ejection fraction, by visual estimation, is >75%.   2. Hyperdynamic global left ventricular systolic function.   3. Normal left ventricular internal cavity size.   4. Spectral Doppler shows impaired relaxation pattern of left ventricular myocardial filling (Grade I diastolic dysfunction).   5. There is no evidence of pericardial effusion.   6. Trace mitral valve regurgitation.   7. Mild tricuspid regurgitation.   8. Mild pulmonic valve regurgitation.      Chest xray (11/3/2020):  IMPRESSION:   No evidence of active chest disease.

## 2020-11-04 NOTE — ED ADULT NURSE REASSESSMENT NOTE - NS ED NURSE REASSESS COMMENT FT1
Pt glucose 57 apple juice and orange juice given will continue to monitor Pt glucose 57 apple juice and orange juice given will continue to monitor dextrose gel to be given Pt is stable, awake and able to swallow.

## 2020-11-04 NOTE — CONSULT NOTE ADULT - SUBJECTIVE AND OBJECTIVE BOX
HISTORY OF PRESENT ILLNESS: This is an 85-year-old man with a past medical history significant for DM, HTN, CKD, and CAD who presented to the ED with complaints of a two-week history of confusion, vertigo, and a globus sensation without dysphagia.  Patient states he has not been feeling well since his having his flu shot 2 weeks ago, states he is not eating well and feels ill.  States he has lost weight over the year (used to weigh 245lbs).  He was evaluated for dizziness here 1 year ago with normal MR head and neck and normal TTE.  He lives alone with his wife who also has multiple medical problems.   He is ambulatory.  Otherwise denies pain, bleeding, SOB, chest pain, abdominal pain or fevers.  He complained to the PCP about the globus sensation and was advised to use saline spray, which did not help.  He denies any nausea, vomiting or regurgitation.  He does not believe he has ever undergone an EGD.  He finds the sensation frustrating.    ROS: A 14-point review of systems was completed and was otherwise negative save what was reported in the HPI.    PAST MEDICAL/SURGICAL HISTORY:  Diabetes  HTN (hypertension)  CKD (chronic kidney disease) stage 3, GFR 30-59 ml/min  Hyperlipidemia, unspecified hyperlipidemia type  Hypothyroidism, unspecified type  Coronary artery disease involving native heart without angina pectoris, unspecified vessel or lesion type  S/P CABG (coronary artery bypass graft)    SOCIAL HISTORY:  - TOBACCO: Denies  - ALCOHOL: Denies  - ILLICIT DRUG USE: Denies    FAMILY HISTORY: No known history of gastrointestinal or liver disease;  No pertinent family history in first degree relatives    HOME MEDICATIONS:  atenolol 25 mg oral tablet: 1 tab(s) orally 2 times a day (2020 19:09)  febuxostat 40 mg oral tablet: 1 tab(s) orally once a day (2020 19:09)  glimepiride 4 mg oral tablet: 1 tab(s) orally once a day (2020 19:09)  isosorbide mononitrate 30 mg oral tablet, extended release: 1 tab(s) orally once a day (in the morning) (2020 19:09)  levothyroxine 25 mcg (0.025 mg) oral tablet: 1 tab(s) orally once a day (2020 19:09)  pravastatin 40 mg oral tablet: 1 tab(s) orally once a day (2020 19:09)    INPATIENT MEDICATIONS:  MEDICATIONS  (STANDING):  aspirin enteric coated 162 milliGRAM(s) Oral daily  ATENolol  Tablet 25 milliGRAM(s) Oral two times a day  atorvastatin 10 milliGRAM(s) Oral at bedtime  dextrose 5%. 1000 milliLiter(s) (50 mL/Hr) IV Continuous <Continuous>  dextrose 50% Injectable 12.5 Gram(s) IV Push once  dextrose 50% Injectable 25 Gram(s) IV Push once  dextrose 50% Injectable 25 Gram(s) IV Push once  insulin lispro (ADMELOG) corrective regimen sliding scale   SubCutaneous three times a day before meals  isosorbide   mononitrate ER Tablet (IMDUR) 30 milliGRAM(s) Oral daily  levothyroxine 25 MICROGram(s) Oral daily  pantoprazole    Tablet 40 milliGRAM(s) Oral before breakfast    MEDICATIONS  (PRN):  dextrose 40% Gel 15 Gram(s) Oral once PRN Blood Glucose LESS THAN 70 milliGRAM(s)/deciliter  glucagon  Injectable 1 milliGRAM(s) IntraMuscular once PRN Glucose LESS THAN 70 milligrams/deciliter    ALLERGIES:  iodine (Anaphylaxis)    T(C): 36.7 (20 @ 11:01), Max: 37 (20 @ 11:41)  HR: 63 (20 @ 11:01) (56 - 67)  BP: 175/81 (20 @ 11:01) (145/76 - 190/74)  RR: 18 (20 @ 11:01) (16 - 18)  SpO2: 100% (20 @ 11:01) (98% - 100%)    PHYSICAL EXAM:  Constitutional: Well-developed, well-nourished, in no apparent distress  Eyes: Sclerae anicteric, conjunctivae normal  ENMT: Mucus membranes moist, no oropharyngeal thrush noted  Neck: No thyroid nodules appreciated, no significant cervical or supraclavicular lymphadenopathy  Respiratory: Breathing nonlabored; clear to auscultation  Cardiovascular: Regular rate and rhythm  Gastrointestinal: Soft, nontender, nondistended, normoactive bowel sounds; no hepatosplenomegaly appreciated; no rebound tenderness or involuntary guarding  Extremities: No clubbing, cyanosis or edema  Neurological: Alert and oriented to person, place and time; no asterixis  Skin: No jaundice  Lymph Nodes: No significant lymphadenopathy  Musculoskeletal: No significant peripheral atrophy  Psychiatric: Affect and mood appropriate      LABS:             13.7   8.80  )-----------( 255      (  @ 13:04 )             42.0       PT/INR - ( 2020 13:04 )   PT: 13.1 sec;   INR: 1.14 ratio         PTT - ( 2020 13:04 )  PTT:31.3 sec      135  |  102  |  26.0<H>  ----------------------------<  93  4.5   |  19.0<L>  |  2.04<H>    Ca    8.6      2020 05:28  Mg     2.3         TPro  6.5<L>  /  Alb  3.9  /  TBili  0.6  /  DBili  x   /  AST  24  /  ALT  11  /  AlkPhos  69      LIVER FUNCTIONS - ( 2020 13:04 )  Alb: 3.9 g/dL / Pro: 6.5 g/dL / ALK PHOS: 69 U/L / ALT: 11 U/L / AST: 24 U/L / GGT: x               Urinalysis Basic - ( 2020 17:57 )    Color: Yellow / Appearance: Clear / S.015 / pH: x  Gluc: x / Ketone: Negative  / Bili: Negative / Urobili: Negative mg/dL   Blood: x / Protein: 15 mg/dL / Nitrite: Negative   Leuk Esterase: Negative / RBC: 0-2 /HPF / WBC 3-5   Sq Epi: x / Non Sq Epi: Occasional / Bacteria: Occasional      IMAGING: I personally reviewed the CT chest, and I agree with the radiologist's interpretation as described below:    < from: CT Chest No Cont (20 @ 14:04) >   EXAM:  CT CHEST                          PROCEDURE DATE:  2020      INTERPRETATION:  CLINICAL INFORMATION: Weight loss, neck lump    COMPARISON: None.    PROCEDURE:  CT of the Chest was performed without intravenous contrast.  Sagittal and coronal reformats were performed.    FINDINGS:    LUNGS AND AIRWAYS: Patent central airways.  Small area of scar in the left lower lobe. No acute lung consolidation. Several small benign calcified granulomata. No suspicious pulmonary mass.  PLEURA:Trace loculated left pleural effusion.  MEDIASTINUM AND CHRIS: Small calcified right hilar lymph nodes compatible with old granulomatous infection. No suspicious lymphadenopathy.  VESSELS: Atherosclerotic calcifications, including extensive native coronary artery calcification. Status post coronary artery bypass graft.  HEART: Heart size is normal. No pericardial effusion.  CHEST WALL AND LOWER NECK: Within normal limits.  VISUALIZED UPPER ABDOMEN: Cholelithiasis. Left renal cyst.  BONES: No acutebony abnormality. Status post sternotomy.    IMPRESSION:  No significant pathology or cause for weight loss identified.    < end of copied text >

## 2020-11-04 NOTE — ED CDU PROVIDER DISPOSITION NOTE - PATIENT PORTAL LINK FT
You can access the FollowMyHealth Patient Portal offered by [facility] by registering at the following website: [email or web address]. By joining [employer]’s FollowMyHealth portal, you will also be able to view your health information using other applications (apps) compatible with our system.

## 2020-11-04 NOTE — ED ADULT NURSE REASSESSMENT NOTE - NS ED NURSE REASSESS COMMENT FT1
As per AME Telles checking sugar mariam 15-20 minutes. Pt last sugar 144 after insulin. Pt resting comfortably in bed will continue to monitor.

## 2020-11-04 NOTE — PROGRESS NOTE ADULT - ASSESSMENT
Assessment:  Kumar Gannon is an 85 year old man with past medical history of Coronary artery disease (s/p multiple PCIs back in 1990s & CABG x4 in 2011) Hypertension, Hyperlipidemia, Diabetes mellitus who presents with difficulty swallowing. The patient primary issue appears to be a sensation of lump in his throat and given significant weight loss would recommend Gastroenterology consult. On exam, he is not in decompensated heart failure. ECG with no acute ischemic ST/T wave changes, troponin also negative, less concern for acute ischemic process. Patient reports a fairly stable exercise tolerance and is not reporting a clear history of chest discomfort or dyspnea to me on interview.    Recommendations:  [] Lump sensation in neck and weight loss: Follow up Gastroenterology  [] Patient denies symptoms of angina or dyspnea at this time, reports rare episodes of dyspnea on exertion at home but appears fairly active. ECG with no acute ischemic ST/T wave changes and troponin negative, less concern for acute coronary syndrome. Echo with normal LV systolic function and no significant valvular disease. Recommended nuclear stress test prior to discharge but patient declines. Patient should follow up in cardiology office in 2-3 weeks.   [] CAD: Patient should continue home meds: beta blocker, imdur, aspirin    Thank you for the consult. No further inpatient cardiac testing at this time, please re-consult if needed.    Charles Lagos MD  Cardiology

## 2020-11-04 NOTE — ED ADULT NURSE REASSESSMENT NOTE - COMFORT CARE
meal provided
ambulated to bathroom/plan of care explained/wait time explained
plan of care explained/po fluids offered/meal provided/wait time explained

## 2020-11-04 NOTE — ED CDU PROVIDER DISPOSITION NOTE - CLINICAL COURSE
85M PMH CAD, CABG, vertigo, DM, HTN presented to ED c/o globus sensation in throat, as well as dizziness for past 2 weeks. ED workup with negative ct head, soft tissue neck and chest. Labs baseline. Seen by cardiology who recommended TTE, LVEF>75%. Placed in obs for GI consult, evaluated this AM and cleared for dc with outpt f/u, started on protonix. I spoke to Dr. Lagos, cardiology who states pt is clear for dc from cardiology perspective can f/u in office. Pt refused MR anson while in obs stating his dizziness is chronic and he suffers from vertigo. Pt neuro intact at this time. CM set up outpt neuro appt for pt. Stating he feels okay and needs to go home as he is his wife's primary caretaker. Pt given copy of all results and given return precautions. Stable for dc. 85M PMH CAD, CABG, vertigo, DM, HTN presented to ED c/o globus sensation in throat, as well as dizziness for past 2 weeks. ED workup with negative ct head, soft tissue neck and chest. Labs baseline. Seen by cardiology who recommended TTE, LVEF>75%. Placed in obs for GI consult, evaluated this AM and cleared for dc with outpt f/u, started on protonix. I spoke to Dr. Lagos, cardiology who states pt is clear for dc from cardiology perspective can f/u in office. Pt refused MR griggs while in obs stating his dizziness is chronic and he suffers from vertigo. Pt neuro intact at this time, A&O x 4, demonstrates competency in making medical decisions. CM set up outpt neuro appt for pt. Stating he feels okay and needs to go home as he is his wife's primary caretaker. Pt given copy of all results and given return precautions. Stable for dc.

## 2020-11-04 NOTE — ED ADULT NURSE REASSESSMENT NOTE - NS ED NURSE REASSESS COMMENT FT1
Assumed care of the patient @0730. Pt A&Ox4. Pt on cardiac monitor sinus 63, denies chest pain. Pts blood glucose this am 57, AME Cole made aware, Dextrose gel given repeat 104, pt ate breakfast repeat blood glucose 149.  Patient in understanding of plan of care. Patient with no further questions for the RN. Resting in comfort. Call bell within reach and encouraged to use when assistance needed. Will continue to monitor.

## 2020-11-04 NOTE — ED CDU PROVIDER SUBSEQUENT DAY NOTE - PROGRESS NOTE DETAILS
Pt seen by GI Dr. Vazquez, recommending protonix and outpt f/u. Spoke with CM, states will get pt outpt appt with neurology for evaluation of his dizziness/confusion for the past several weeks. Pt neuro intact at this time. Cards consult note stating possible NST. Call placed to pt's cardiologist, pending callback.

## 2020-11-04 NOTE — ED CDU PROVIDER SUBSEQUENT DAY NOTE - ATTENDING CONTRIBUTION TO CARE
I, Colin Rosario, performed the initial face to face bedside interview with this patient regarding history of present illness, review of symptoms and relevant past medical, social and family history.  I completed an independent physical examination.  I was the initial provider who evaluated this patient. I have signed out the follow up of any pending tests (i.e. labs, radiological studies) to the ACP.  I have communicated the patient’s plan of care and disposition with the ACP.

## 2020-11-04 NOTE — ED CDU PROVIDER DISPOSITION NOTE - ATTENDING CONTRIBUTION TO CARE
I, Colin Rosario, performed the initial face to face bedside interview with this patient regarding history of present illness, review of symptoms and relevant past medical, social and family history.  I completed an independent physical examination.  I was the initial provider who evaluated this patient. I have signed out the follow up of any pending tests (i.e. labs, radiological studies) to the ACP.  I have communicated the patient’s plan of care and disposition with the ACP.  The history, relevant review of systems, past medical and surgical history, medical decision making, and physical examination was documented by the scribe in my presence and I attest to the accuracy of the documentation.

## 2020-11-05 ENCOUNTER — EMERGENCY (EMERGENCY)
Facility: HOSPITAL | Age: 85
LOS: 1 days | Discharge: HOME | End: 2020-11-05
Attending: EMERGENCY MEDICINE
Payer: MEDICARE

## 2020-11-05 VITALS
TEMPERATURE: 98 F | DIASTOLIC BLOOD PRESSURE: 78 MMHG | OXYGEN SATURATION: 100 % | RESPIRATION RATE: 18 BRPM | HEART RATE: 60 BPM | SYSTOLIC BLOOD PRESSURE: 138 MMHG

## 2020-11-05 VITALS
DIASTOLIC BLOOD PRESSURE: 65 MMHG | HEIGHT: 69 IN | RESPIRATION RATE: 19 BRPM | HEART RATE: 75 BPM | TEMPERATURE: 98 F | OXYGEN SATURATION: 99 % | WEIGHT: 187.39 LBS | SYSTOLIC BLOOD PRESSURE: 125 MMHG

## 2020-11-05 DIAGNOSIS — Z95.1 PRESENCE OF AORTOCORONARY BYPASS GRAFT: Chronic | ICD-10-CM

## 2020-11-05 PROBLEM — I10 ESSENTIAL (PRIMARY) HYPERTENSION: Chronic | Status: ACTIVE | Noted: 2020-11-03

## 2020-11-05 PROBLEM — E11.9 TYPE 2 DIABETES MELLITUS WITHOUT COMPLICATIONS: Chronic | Status: ACTIVE | Noted: 2020-11-03

## 2020-11-05 LAB
A1C WITH ESTIMATED AVERAGE GLUCOSE RESULT: 5.4 % — SIGNIFICANT CHANGE UP (ref 4–5.6)
ALBUMIN SERPL ELPH-MCNC: 3.9 G/DL — SIGNIFICANT CHANGE UP (ref 3.3–5.2)
ALP SERPL-CCNC: 73 U/L — SIGNIFICANT CHANGE UP (ref 40–120)
ALT FLD-CCNC: 13 U/L — SIGNIFICANT CHANGE UP
ANION GAP SERPL CALC-SCNC: 14 MMOL/L — SIGNIFICANT CHANGE UP (ref 5–17)
AST SERPL-CCNC: 27 U/L — SIGNIFICANT CHANGE UP
BASOPHILS # BLD AUTO: 0.05 K/UL — SIGNIFICANT CHANGE UP (ref 0–0.2)
BASOPHILS NFR BLD AUTO: 0.5 % — SIGNIFICANT CHANGE UP (ref 0–2)
BILIRUB SERPL-MCNC: 0.5 MG/DL — SIGNIFICANT CHANGE UP (ref 0.4–2)
BUN SERPL-MCNC: 28 MG/DL — HIGH (ref 8–20)
CALCIUM SERPL-MCNC: 9 MG/DL — SIGNIFICANT CHANGE UP (ref 8.6–10.2)
CHLORIDE SERPL-SCNC: 100 MMOL/L — SIGNIFICANT CHANGE UP (ref 98–107)
CO2 SERPL-SCNC: 22 MMOL/L — SIGNIFICANT CHANGE UP (ref 22–29)
CREAT SERPL-MCNC: 2.15 MG/DL — HIGH (ref 0.5–1.3)
CULTURE RESULTS: SIGNIFICANT CHANGE UP
EOSINOPHIL # BLD AUTO: 0.35 K/UL — SIGNIFICANT CHANGE UP (ref 0–0.5)
EOSINOPHIL NFR BLD AUTO: 3.8 % — SIGNIFICANT CHANGE UP (ref 0–6)
ESTIMATED AVERAGE GLUCOSE: 108 MG/DL — SIGNIFICANT CHANGE UP (ref 68–114)
GLUCOSE BLDC GLUCOMTR-MCNC: 130 MG/DL — HIGH (ref 70–99)
GLUCOSE BLDC GLUCOMTR-MCNC: 135 MG/DL — HIGH (ref 70–99)
GLUCOSE BLDC GLUCOMTR-MCNC: 87 MG/DL — SIGNIFICANT CHANGE UP (ref 70–99)
GLUCOSE SERPL-MCNC: 126 MG/DL — HIGH (ref 70–99)
HCT VFR BLD CALC: 42.3 % — SIGNIFICANT CHANGE UP (ref 39–50)
HGB BLD-MCNC: 13.9 G/DL — SIGNIFICANT CHANGE UP (ref 13–17)
IMM GRANULOCYTES NFR BLD AUTO: 0.2 % — SIGNIFICANT CHANGE UP (ref 0–1.5)
LYMPHOCYTES # BLD AUTO: 0.73 K/UL — LOW (ref 1–3.3)
LYMPHOCYTES # BLD AUTO: 7.8 % — LOW (ref 13–44)
MCHC RBC-ENTMCNC: 30.3 PG — SIGNIFICANT CHANGE UP (ref 27–34)
MCHC RBC-ENTMCNC: 32.9 GM/DL — SIGNIFICANT CHANGE UP (ref 32–36)
MCV RBC AUTO: 92.4 FL — SIGNIFICANT CHANGE UP (ref 80–100)
MONOCYTES # BLD AUTO: 0.96 K/UL — HIGH (ref 0–0.9)
MONOCYTES NFR BLD AUTO: 10.3 % — SIGNIFICANT CHANGE UP (ref 2–14)
NEUTROPHILS # BLD AUTO: 7.19 K/UL — SIGNIFICANT CHANGE UP (ref 1.8–7.4)
NEUTROPHILS NFR BLD AUTO: 77.4 % — HIGH (ref 43–77)
PLATELET # BLD AUTO: 242 K/UL — SIGNIFICANT CHANGE UP (ref 150–400)
POTASSIUM SERPL-MCNC: 5.1 MMOL/L — SIGNIFICANT CHANGE UP (ref 3.5–5.3)
POTASSIUM SERPL-SCNC: 5.1 MMOL/L — SIGNIFICANT CHANGE UP (ref 3.5–5.3)
PROT SERPL-MCNC: 6.8 G/DL — SIGNIFICANT CHANGE UP (ref 6.6–8.7)
RBC # BLD: 4.58 M/UL — SIGNIFICANT CHANGE UP (ref 4.2–5.8)
RBC # FLD: 13.3 % — SIGNIFICANT CHANGE UP (ref 10.3–14.5)
SODIUM SERPL-SCNC: 136 MMOL/L — SIGNIFICANT CHANGE UP (ref 135–145)
SPECIMEN SOURCE: SIGNIFICANT CHANGE UP
WBC # BLD: 9.3 K/UL — SIGNIFICANT CHANGE UP (ref 3.8–10.5)
WBC # FLD AUTO: 9.3 K/UL — SIGNIFICANT CHANGE UP (ref 3.8–10.5)

## 2020-11-05 PROCEDURE — G0378: CPT

## 2020-11-05 PROCEDURE — 99283 EMERGENCY DEPT VISIT LOW MDM: CPT

## 2020-11-05 PROCEDURE — 82962 GLUCOSE BLOOD TEST: CPT

## 2020-11-05 PROCEDURE — 80053 COMPREHEN METABOLIC PANEL: CPT

## 2020-11-05 PROCEDURE — 36415 COLL VENOUS BLD VENIPUNCTURE: CPT

## 2020-11-05 PROCEDURE — 99218: CPT | Mod: CS

## 2020-11-05 PROCEDURE — 83036 HEMOGLOBIN GLYCOSYLATED A1C: CPT

## 2020-11-05 PROCEDURE — 85025 COMPLETE CBC W/AUTO DIFF WBC: CPT

## 2020-11-05 RX ORDER — ATENOLOL 25 MG/1
25 TABLET ORAL
Refills: 0 | Status: DISCONTINUED | OUTPATIENT
Start: 2020-11-05 | End: 2020-11-09

## 2020-11-05 RX ORDER — LEVOTHYROXINE SODIUM 125 MCG
25 TABLET ORAL DAILY
Refills: 0 | Status: DISCONTINUED | OUTPATIENT
Start: 2020-11-05 | End: 2020-11-09

## 2020-11-05 RX ORDER — ISOSORBIDE MONONITRATE 60 MG/1
30 TABLET, EXTENDED RELEASE ORAL DAILY
Refills: 0 | Status: DISCONTINUED | OUTPATIENT
Start: 2020-11-05 | End: 2020-11-09

## 2020-11-05 RX ORDER — SODIUM CHLORIDE 9 MG/ML
1000 INJECTION, SOLUTION INTRAVENOUS
Refills: 0 | Status: DISCONTINUED | OUTPATIENT
Start: 2020-11-05 | End: 2020-11-09

## 2020-11-05 RX ORDER — DEXTROSE 50 % IN WATER 50 %
12.5 SYRINGE (ML) INTRAVENOUS ONCE
Refills: 0 | Status: DISCONTINUED | OUTPATIENT
Start: 2020-11-05 | End: 2020-11-09

## 2020-11-05 RX ORDER — DEXTROSE 50 % IN WATER 50 %
25 SYRINGE (ML) INTRAVENOUS ONCE
Refills: 0 | Status: DISCONTINUED | OUTPATIENT
Start: 2020-11-05 | End: 2020-11-09

## 2020-11-05 RX ORDER — DEXTROSE 50 % IN WATER 50 %
15 SYRINGE (ML) INTRAVENOUS ONCE
Refills: 0 | Status: DISCONTINUED | OUTPATIENT
Start: 2020-11-05 | End: 2020-11-09

## 2020-11-05 RX ORDER — ATORVASTATIN CALCIUM 80 MG/1
10 TABLET, FILM COATED ORAL AT BEDTIME
Refills: 0 | Status: DISCONTINUED | OUTPATIENT
Start: 2020-11-05 | End: 2020-11-09

## 2020-11-05 RX ORDER — GLUCAGON INJECTION, SOLUTION 0.5 MG/.1ML
1 INJECTION, SOLUTION SUBCUTANEOUS ONCE
Refills: 0 | Status: DISCONTINUED | OUTPATIENT
Start: 2020-11-05 | End: 2020-11-09

## 2020-11-05 RX ADMIN — Medication 25 MICROGRAM(S): at 05:03

## 2020-11-05 RX ADMIN — ISOSORBIDE MONONITRATE 30 MILLIGRAM(S): 60 TABLET, EXTENDED RELEASE ORAL at 05:03

## 2020-11-05 RX ADMIN — ATENOLOL 25 MILLIGRAM(S): 25 TABLET ORAL at 05:03

## 2020-11-05 NOTE — ED ADULT NURSE NOTE - OBJECTIVE STATEMENT
85y Male A&Ox4 c/o hypoglycemia. Pt reports rapidly dropping glucose numbers since his discharge from the hospital 2 days ago. Pt reports being at 120's and 140's with rapid drop to 60's with associated blurred vision. Pt reports being able to supplement with PO simple sugars but unable to maintain BS. Pt denies LOC, dizziness, chest pain, shortness of breath, changes in GI/ patterns. No obvious signs of trauma or injury.

## 2020-11-05 NOTE — ED CDU PROVIDER INITIAL DAY NOTE - OBJECTIVE STATEMENT
86 yo male recently discharged observation patient presenting with episode of hypoglycemia while at home, states that his blood sugar dropped to 65 and was having some blurry vision states that he drank oj and ate some candy then went to bed then woke up at 1 and rechecked his sugar which was 120. denies current headache blurry vision nausea abdominal pain chest pain sob, denies numbness or tingling to extremities. states that while he was in observation that his blood sugar was going low. pt is written for glimepiride 4mg. states that he has not taken that medication in some time. unsure when last dose was, states that his wife gives him his medications.

## 2020-11-05 NOTE — ED PROVIDER NOTE - PROGRESS NOTE DETAILS
blood sugar drop. considering medication list with glimepiride daily will continue to monitor blood sugar and have medication reconciliation in AM pending labs to check kidney function. crt 2's. will place in observation to continue monitoring sugars.

## 2020-11-05 NOTE — ED CDU PROVIDER DISPOSITION NOTE - PATIENT PORTAL LINK FT
You can access the FollowMyHealth Patient Portal offered by [facility] by registering at the following website: [email or web address]. By joining [employer]’s FollowMyHealth portal, you will also be able to view your health information using other applications (apps) compatible with our system.

## 2020-11-05 NOTE — ED CDU PROVIDER INITIAL DAY NOTE - CROS ED CONS ALL NEG
Last visit 2/22/19  Last refill 3/16/19 - OK for refill  Evelyn Mendez RN  [facility]   negative...

## 2020-11-05 NOTE — ED ADULT TRIAGE NOTE - CHIEF COMPLAINT QUOTE
Patient presented to ed secondary complains of sugar dropping fast As per patient few hour ago his sugar drop from 160's to 120's. Finger stick performed in triage and noted to be 144. Patient presented to ed secondary to complains of sugar dropping fast As per patient few hour ago his sugar drop from 160's to 120's. Finger stick performed in triage and noted to be 144.

## 2020-11-05 NOTE — PROVIDER CONTACT NOTE (OTHER) - ASSESSMENT
No c/o pain, before, during or after Rx. Pt is functionally independent and not in need of PT.  Will no longer continue to follow. Pt left sitting OOB in chair in no apparent distress and call bell within reach, RN made aware.

## 2020-11-05 NOTE — ED ADULT NURSE REASSESSMENT NOTE - NS ED NURSE REASSESS COMMENT FT1
received pt from off going shift. pt a&ox3, denies any pain/discomfort. resp spontaneous, even and unlabored, lungs clear, skin warm and dry, no BLE edema. BS 83. VS as documented. remains on observation for BS monitoring. awaiting breakfast tray. pt ambulating to and from bathroom steadily. updated on plan of care, verbalize understanding. call bell in reach

## 2020-11-05 NOTE — ED CDU PROVIDER DISPOSITION NOTE - ATTENDING CONTRIBUTION TO CARE
I, Macy Smith MD have personally performed a face to face diagnostic evaluation on this patient.  I have reviewed the ACP note and agree with the history, exam, and plan of care, except as noted.     Exam:  Head: atraumatic, normacephalic  Face: atraumatic, no crepitus no orbiral/maxillary/mandibular ttp  throat: uvula midline no exudates  eyes: perrla eomi  heart: rrr s1s2  lungs: ctab  abd: soft, nt nd +bs no rebound/guarding no cva ttp  skin: warm  LE: no swelling, no calf ttp  back: no midline cervical/thoracic/lumbar ttp    --pt is feeling better; Denies f/c/n/v/cp/sob/palpitations/ cough/rash/headache/dizziness/abd.pain/d/c/dysuria/hematuria  in obs for FS checks AS Came in with hypoglycemia; case management contacted for safe discharge; pt will follow up with PMD for appropriate meds management

## 2020-11-05 NOTE — ED PROVIDER NOTE - ATTENDING CONTRIBUTION TO CARE
Pt with labile blood sugars, was as low as 67 at home, questionable sulfonylurea usage. Will keep in observation to trend blood sugars, will need to clarify medication regimen at home

## 2020-11-05 NOTE — ED PROVIDER NOTE - CLINICAL SUMMARY MEDICAL DECISION MAKING FREE TEXT BOX
male with episode of hypoglycemia at home blood sugar 144 upon arrival to er asymptomatic will check sugars Q1 hour and re-eval

## 2020-11-05 NOTE — ED CDU PROVIDER INITIAL DAY NOTE - PROGRESS NOTE DETAILS
pending labs to check kidney function. Patient in obs for hypoglycemia.  patient with stable sugars here. patient states was advised not to take meds by PMD, he will confirm he is not taking, will d/c

## 2020-11-05 NOTE — ED PROVIDER NOTE - OBJECTIVE STATEMENT
84 yo male recently discharged observation patient presenting with episode of hypoglycemia while at home, states that  his blood sugar dropped to 65 and was having some blurry vision states that he drank oj and ate some candy then went to bed then woke up at 1 and rechecked his sugar which was 120. denies current headache blurry vision nausea abdominal pain chest pain sob, denies numbness or tingling to extremities. states that while he was in observation that his blood sugar was going low. pt is written for glimepiride 4mg. states that he has not taken that medication in some time. 86 yo male recently discharged observation patient presenting with episode of hypoglycemia while at home, states that his blood sugar dropped to 65 and was having some blurry vision states that he drank oj and ate some candy then went to bed then woke up at 1 and rechecked his sugar which was 120. denies current headache blurry vision nausea abdominal pain chest pain sob, denies numbness or tingling to extremities. states that while he was in observation that his blood sugar was going low. pt is written for glimepiride 4mg. states that he has not taken that medication in some time. unsure when last dose was, states that his wife gives him his medications.

## 2020-11-05 NOTE — ED ADULT NURSE REASSESSMENT NOTE - NS ED NURSE REASSESS COMMENT FT1
pt remains a&ox3, denies any pain/discomfort. lunch provided. bs monitored as rxd. pending dispo. updated on plan of care, verbalize understanding. call bell in reach

## 2020-11-05 NOTE — ED CDU PROVIDER DISPOSITION NOTE - NSFOLLOWUPINSTRUCTIONS_ED_ALL_ED_FT
- Please follow up with your Primary Care Doctor in 24-48 hr.  - Seek immediate medical care for any new, worsening or concerning signs or symptoms.   - Take medications as directed, be sure to read all instructions on packaging  - You were given copies of all your test results, please bring to your primary care doctor for review of any abnormal test results  - Follow up with PMD this week  Do not take any of the diabetic medications that you were directed to stop      Feel better! - Please follow up with your Primary Care Doctor in 24-48 hr.  - Seek immediate medical care for any new, worsening or concerning signs or symptoms.   - Take medications as directed, be sure to read all instructions on packaging  - You were given copies of all your test results, please bring to your primary care doctor for review of any abnormal test results  - Follow up with PMD Dr. Prieto Cross on Tuesday 11/10 at 1130 AM   Do not take any of the diabetic medications that you were directed to stop      Feel better!

## 2020-11-05 NOTE — ED ADULT NURSE NOTE - CHIEF COMPLAINT QUOTE
Patient presented to ed secondary to complains of sugar dropping fast As per patient few hour ago his sugar drop from 160's to 120's. Finger stick performed in triage and noted to be 144.

## 2020-11-05 NOTE — ED CDU PROVIDER DISPOSITION NOTE - CLINICAL COURSE
patient is a 85 year old male who was admitted to obs after episode of hypoglycemia at home. patient feels well here, BS have been stable here. patient was instructed to not take any meds currently A1C 5.4

## 2020-11-05 NOTE — ED CDU PROVIDER INITIAL DAY NOTE - MEDICAL DECISION MAKING DETAILS
male hx of diabetes was on sulfourea getting medication from wife, episodes of hypoglycemia. consideration for sulfourea reaction. continue to monitor blood sugar

## 2020-12-09 ENCOUNTER — NON-APPOINTMENT (OUTPATIENT)
Age: 85
End: 2020-12-09

## 2020-12-10 NOTE — ED CDU PROVIDER DISPOSITION NOTE - NS_EDPROVIDERDISPOUSERTYPE_ED_A_ED
Attending Attestation (For Attendings USE Only)...
FAMILY HISTORY:  No pertinent family history in first degree relatives

## 2020-12-15 ENCOUNTER — APPOINTMENT (OUTPATIENT)
Dept: GASTROENTEROLOGY | Facility: CLINIC | Age: 85
End: 2020-12-15
Payer: MEDICARE

## 2020-12-15 VITALS
WEIGHT: 178 LBS | TEMPERATURE: 97.6 F | HEIGHT: 69 IN | DIASTOLIC BLOOD PRESSURE: 80 MMHG | SYSTOLIC BLOOD PRESSURE: 137 MMHG | HEART RATE: 80 BPM | BODY MASS INDEX: 26.36 KG/M2

## 2020-12-15 DIAGNOSIS — K59.00 CONSTIPATION, UNSPECIFIED: ICD-10-CM

## 2020-12-15 PROCEDURE — 82272 OCCULT BLD FECES 1-3 TESTS: CPT

## 2020-12-15 PROCEDURE — 99214 OFFICE O/P EST MOD 30 MIN: CPT

## 2020-12-15 RX ORDER — ROPINIROLE 0.5 MG/1
0.5 TABLET, FILM COATED ORAL
Refills: 0 | Status: ACTIVE | COMMUNITY

## 2020-12-15 RX ORDER — PRAVASTATIN SODIUM 40 MG/1
40 TABLET ORAL
Refills: 0 | Status: ACTIVE | COMMUNITY

## 2020-12-15 RX ORDER — ATENOLOL 25 MG/1
25 TABLET ORAL
Refills: 0 | Status: ACTIVE | COMMUNITY

## 2020-12-15 RX ORDER — FEBUXOSTAT 40 MG/1
40 TABLET ORAL
Refills: 0 | Status: ACTIVE | COMMUNITY

## 2020-12-15 RX ORDER — ALPRAZOLAM 0.25 MG/1
0.25 TABLET ORAL
Refills: 0 | Status: ACTIVE | COMMUNITY

## 2020-12-15 RX ORDER — ISOSORBIDE MONONITRATE 30 MG
30 TABLET, EXTENDED RELEASE 24 HR ORAL
Refills: 0 | Status: ACTIVE | COMMUNITY

## 2020-12-15 RX ORDER — CYCLOSPORINE 0.5 MG/ML
0.05 EMULSION OPHTHALMIC
Refills: 0 | Status: ACTIVE | COMMUNITY

## 2020-12-15 RX ORDER — ASPIRIN 81 MG
81 TABLET, DELAYED RELEASE (ENTERIC COATED) ORAL
Refills: 0 | Status: ACTIVE | COMMUNITY

## 2020-12-15 NOTE — PHYSICAL EXAM
[General Appearance - Alert] : alert [General Appearance - In No Acute Distress] : in no acute distress [Sclera] : the sclera and conjunctiva were normal [PERRL With Normal Accommodation] : pupils were equal in size, round, and reactive to light [Extraocular Movements] : extraocular movements were intact [Neck Appearance] : the appearance of the neck was normal [Neck Cervical Mass (___cm)] : no neck mass was observed [Jugular Venous Distention Increased] : there was no jugular-venous distention [Thyroid Diffuse Enlargement] : the thyroid was not enlarged [Thyroid Nodule] : there were no palpable thyroid nodules [Auscultation Breath Sounds / Voice Sounds] : lungs were clear to auscultation bilaterally [Heart Rate And Rhythm] : heart rate was normal and rhythm regular [Heart Sounds] : normal S1 and S2 [Heart Sounds Gallop] : no gallops [Murmurs] : no murmurs [Heart Sounds Pericardial Friction Rub] : no pericardial rub [Bowel Sounds] : normal bowel sounds [Abdomen Soft] : soft [Abdomen Tenderness] : non-tender [] : no hepato-splenomegaly [Abdomen Mass (___ Cm)] : no abdominal mass palpated [Normal Sphincter Tone] : normal sphincter tone [No Rectal Mass] : no rectal mass [External Hemorrhoid] : external hemorrhoids [Occult Blood Positive] : stool was negative for occult blood [Oriented To Time, Place, And Person] : oriented to person, place, and time [Impaired Insight] : insight and judgment were intact [Affect] : the affect was normal

## 2020-12-15 NOTE — HISTORY OF PRESENT ILLNESS
[de-identified] : raman presents with a couple week history of some difficulty moving his bowels. He has moved normally last few days.He's taken of some MiraLax recently but doesn't think it really makes a difference. He also when he strains has some prolapse of his hemorrhoids and then sees a little blood in the toilet paper. Patient had colonoscopies in the past but not for number of years. He was just in the hospitaland seen by my partner, Dr. Vazquez, for evaluation of weight loss and globus symptoms. The global symptoms have disappeared. He was put on PPIs for reflux. He has no heartburn dysphagia abdominal pain or nausea or vomiting. He says he had some trouble eating after having implants and he's had memory loss as well as being treated for significant anxiety. Blood work in the hospital was unremarkable at. CBC was normalthe only real abnormality was acreatinine of 2.15 and a glucose of 128 and he is being followed by his primary care physician who saw 2 weeks ago.

## 2020-12-15 NOTE — ASSESSMENT
[FreeTextEntry1] : I discussed the patient's symptoms with both himself and his wife. I think he has some mild social constipation possibly related to his eating issues with his teeth as well as his anxiety. I advise to take Citrucel 1 tablespoon in 8 ounce of cold water daily. I discussed with both his wife and the patient that given the fact that his hemoglobin is normal his age and his current issues with anxiety memory lossI do not think a colonoscopy appropriate the wife agrees.

## 2022-05-10 NOTE — ED ADULT TRIAGE NOTE - NS ED TRIAGE HISTORIAN
Patient notified. Patient verbalized understanding of the information given.   No further questions at this time    Electronically Signed by:    Carol Ann Dean, [address] , 5/10/2022 Patient

## 2022-11-04 ENCOUNTER — NON-APPOINTMENT (OUTPATIENT)
Age: 87
End: 2022-11-04

## 2022-11-04 DIAGNOSIS — K62.89 OTHER SPECIFIED DISEASES OF ANUS AND RECTUM: ICD-10-CM

## 2022-11-05 ENCOUNTER — INPATIENT (INPATIENT)
Facility: HOSPITAL | Age: 87
LOS: 5 days | Discharge: HOME | DRG: 394 | End: 2022-11-11
Attending: HOSPITALIST | Admitting: STUDENT IN AN ORGANIZED HEALTH CARE EDUCATION/TRAINING PROGRAM
Payer: MEDICARE

## 2022-11-05 VITALS
DIASTOLIC BLOOD PRESSURE: 85 MMHG | RESPIRATION RATE: 18 BRPM | OXYGEN SATURATION: 98 % | SYSTOLIC BLOOD PRESSURE: 168 MMHG | HEART RATE: 56 BPM | TEMPERATURE: 98 F

## 2022-11-05 DIAGNOSIS — K62.5 HEMORRHAGE OF ANUS AND RECTUM: ICD-10-CM

## 2022-11-05 DIAGNOSIS — Z95.1 PRESENCE OF AORTOCORONARY BYPASS GRAFT: Chronic | ICD-10-CM

## 2022-11-05 LAB
ALBUMIN SERPL ELPH-MCNC: 3.3 G/DL — SIGNIFICANT CHANGE UP (ref 3.3–5.2)
ALP SERPL-CCNC: 57 U/L — SIGNIFICANT CHANGE UP (ref 40–120)
ALT FLD-CCNC: 7 U/L — SIGNIFICANT CHANGE UP
ANION GAP SERPL CALC-SCNC: 11 MMOL/L — SIGNIFICANT CHANGE UP (ref 5–17)
APPEARANCE UR: CLEAR — SIGNIFICANT CHANGE UP
APTT BLD: 31.2 SEC — SIGNIFICANT CHANGE UP (ref 27.5–35.5)
AST SERPL-CCNC: 19 U/L — SIGNIFICANT CHANGE UP
BACTERIA # UR AUTO: ABNORMAL
BASOPHILS # BLD AUTO: 0.06 K/UL — SIGNIFICANT CHANGE UP (ref 0–0.2)
BASOPHILS NFR BLD AUTO: 0.9 % — SIGNIFICANT CHANGE UP (ref 0–2)
BILIRUB SERPL-MCNC: 0.6 MG/DL — SIGNIFICANT CHANGE UP (ref 0.4–2)
BILIRUB UR-MCNC: NEGATIVE — SIGNIFICANT CHANGE UP
BLD GP AB SCN SERPL QL: SIGNIFICANT CHANGE UP
BUN SERPL-MCNC: 21.8 MG/DL — HIGH (ref 8–20)
CALCIUM SERPL-MCNC: 8.8 MG/DL — SIGNIFICANT CHANGE UP (ref 8.4–10.5)
CHLORIDE SERPL-SCNC: 107 MMOL/L — SIGNIFICANT CHANGE UP (ref 96–108)
CO2 SERPL-SCNC: 23 MMOL/L — SIGNIFICANT CHANGE UP (ref 22–29)
COLOR SPEC: YELLOW — SIGNIFICANT CHANGE UP
CREAT SERPL-MCNC: 1.77 MG/DL — HIGH (ref 0.5–1.3)
DIFF PNL FLD: NEGATIVE — SIGNIFICANT CHANGE UP
EGFR: 37 ML/MIN/1.73M2 — LOW
EOSINOPHIL # BLD AUTO: 0.12 K/UL — SIGNIFICANT CHANGE UP (ref 0–0.5)
EOSINOPHIL NFR BLD AUTO: 1.8 % — SIGNIFICANT CHANGE UP (ref 0–6)
EPI CELLS # UR: SIGNIFICANT CHANGE UP
GLUCOSE BLDC GLUCOMTR-MCNC: 148 MG/DL — HIGH (ref 70–99)
GLUCOSE BLDC GLUCOMTR-MCNC: 90 MG/DL — SIGNIFICANT CHANGE UP (ref 70–99)
GLUCOSE SERPL-MCNC: 120 MG/DL — HIGH (ref 70–99)
GLUCOSE UR QL: NEGATIVE MG/DL — SIGNIFICANT CHANGE UP
HCT VFR BLD CALC: 36.5 % — LOW (ref 39–50)
HGB BLD-MCNC: 11.8 G/DL — LOW (ref 13–17)
IMM GRANULOCYTES NFR BLD AUTO: 0.5 % — SIGNIFICANT CHANGE UP (ref 0–0.9)
INR BLD: 1.11 RATIO — SIGNIFICANT CHANGE UP (ref 0.88–1.16)
KETONES UR-MCNC: NEGATIVE — SIGNIFICANT CHANGE UP
LEUKOCYTE ESTERASE UR-ACNC: NEGATIVE — SIGNIFICANT CHANGE UP
LYMPHOCYTES # BLD AUTO: 1.14 K/UL — SIGNIFICANT CHANGE UP (ref 1–3.3)
LYMPHOCYTES # BLD AUTO: 17.5 % — SIGNIFICANT CHANGE UP (ref 13–44)
MCHC RBC-ENTMCNC: 29.5 PG — SIGNIFICANT CHANGE UP (ref 27–34)
MCHC RBC-ENTMCNC: 32.3 GM/DL — SIGNIFICANT CHANGE UP (ref 32–36)
MCV RBC AUTO: 91.3 FL — SIGNIFICANT CHANGE UP (ref 80–100)
MONOCYTES # BLD AUTO: 0.72 K/UL — SIGNIFICANT CHANGE UP (ref 0–0.9)
MONOCYTES NFR BLD AUTO: 11.1 % — SIGNIFICANT CHANGE UP (ref 2–14)
NEUTROPHILS # BLD AUTO: 4.43 K/UL — SIGNIFICANT CHANGE UP (ref 1.8–7.4)
NEUTROPHILS NFR BLD AUTO: 68.2 % — SIGNIFICANT CHANGE UP (ref 43–77)
NITRITE UR-MCNC: NEGATIVE — SIGNIFICANT CHANGE UP
OB PNL STL: POSITIVE
PH UR: 6 — SIGNIFICANT CHANGE UP (ref 5–8)
PLATELET # BLD AUTO: 217 K/UL — SIGNIFICANT CHANGE UP (ref 150–400)
POTASSIUM SERPL-MCNC: 4 MMOL/L — SIGNIFICANT CHANGE UP (ref 3.5–5.3)
POTASSIUM SERPL-SCNC: 4 MMOL/L — SIGNIFICANT CHANGE UP (ref 3.5–5.3)
PROT SERPL-MCNC: 5.7 G/DL — LOW (ref 6.6–8.7)
PROT UR-MCNC: 15
PROTHROM AB SERPL-ACNC: 12.9 SEC — SIGNIFICANT CHANGE UP (ref 10.5–13.4)
RBC # BLD: 4 M/UL — LOW (ref 4.2–5.8)
RBC # FLD: 13.6 % — SIGNIFICANT CHANGE UP (ref 10.3–14.5)
RBC CASTS # UR COMP ASSIST: SIGNIFICANT CHANGE UP /HPF (ref 0–4)
SARS-COV-2 RNA SPEC QL NAA+PROBE: SIGNIFICANT CHANGE UP
SODIUM SERPL-SCNC: 141 MMOL/L — SIGNIFICANT CHANGE UP (ref 135–145)
SP GR SPEC: 1.01 — SIGNIFICANT CHANGE UP (ref 1.01–1.02)
TROPONIN T SERPL-MCNC: <0.01 NG/ML — SIGNIFICANT CHANGE UP (ref 0–0.06)
UROBILINOGEN FLD QL: NEGATIVE MG/DL — SIGNIFICANT CHANGE UP
WBC # BLD: 6.5 K/UL — SIGNIFICANT CHANGE UP (ref 3.8–10.5)
WBC # FLD AUTO: 6.5 K/UL — SIGNIFICANT CHANGE UP (ref 3.8–10.5)
WBC UR QL: SIGNIFICANT CHANGE UP /HPF (ref 0–5)

## 2022-11-05 PROCEDURE — 74176 CT ABD & PELVIS W/O CONTRAST: CPT | Mod: 26,MA

## 2022-11-05 PROCEDURE — 99285 EMERGENCY DEPT VISIT HI MDM: CPT

## 2022-11-05 PROCEDURE — 99223 1ST HOSP IP/OBS HIGH 75: CPT

## 2022-11-05 PROCEDURE — 93010 ELECTROCARDIOGRAM REPORT: CPT | Mod: 76

## 2022-11-05 RX ORDER — SODIUM CHLORIDE 9 MG/ML
1000 INJECTION, SOLUTION INTRAVENOUS
Refills: 0 | Status: DISCONTINUED | OUTPATIENT
Start: 2022-11-05 | End: 2022-11-10

## 2022-11-05 RX ORDER — ONDANSETRON 8 MG/1
4 TABLET, FILM COATED ORAL EVERY 8 HOURS
Refills: 0 | Status: DISCONTINUED | OUTPATIENT
Start: 2022-11-05 | End: 2022-11-11

## 2022-11-05 RX ORDER — ISOSORBIDE MONONITRATE 60 MG/1
1 TABLET, EXTENDED RELEASE ORAL
Qty: 0 | Refills: 0 | DISCHARGE

## 2022-11-05 RX ORDER — FEBUXOSTAT 40 MG/1
1 TABLET ORAL
Qty: 0 | Refills: 0 | DISCHARGE

## 2022-11-05 RX ORDER — GLIMEPIRIDE 1 MG
1 TABLET ORAL
Qty: 0 | Refills: 0 | DISCHARGE

## 2022-11-05 RX ORDER — AMLODIPINE BESYLATE 2.5 MG/1
5 TABLET ORAL DAILY
Refills: 0 | Status: DISCONTINUED | OUTPATIENT
Start: 2022-11-05 | End: 2022-11-06

## 2022-11-05 RX ORDER — GLUCAGON INJECTION, SOLUTION 0.5 MG/.1ML
1 INJECTION, SOLUTION SUBCUTANEOUS ONCE
Refills: 0 | Status: DISCONTINUED | OUTPATIENT
Start: 2022-11-05 | End: 2022-11-10

## 2022-11-05 RX ORDER — ACETAMINOPHEN 500 MG
650 TABLET ORAL EVERY 6 HOURS
Refills: 0 | Status: DISCONTINUED | OUTPATIENT
Start: 2022-11-05 | End: 2022-11-11

## 2022-11-05 RX ORDER — DEXTROSE 50 % IN WATER 50 %
25 SYRINGE (ML) INTRAVENOUS ONCE
Refills: 0 | Status: DISCONTINUED | OUTPATIENT
Start: 2022-11-05 | End: 2022-11-10

## 2022-11-05 RX ORDER — LANOLIN ALCOHOL/MO/W.PET/CERES
3 CREAM (GRAM) TOPICAL AT BEDTIME
Refills: 0 | Status: DISCONTINUED | OUTPATIENT
Start: 2022-11-05 | End: 2022-11-11

## 2022-11-05 RX ORDER — LEVOTHYROXINE SODIUM 125 MCG
1 TABLET ORAL
Qty: 0 | Refills: 0 | DISCHARGE

## 2022-11-05 RX ORDER — ATROPINE SULFATE 0.1 MG/ML
1 SYRINGE (ML) INJECTION ONCE
Refills: 0 | Status: DISCONTINUED | OUTPATIENT
Start: 2022-11-05 | End: 2022-11-06

## 2022-11-05 RX ORDER — LORATADINE 10 MG/1
10 TABLET ORAL DAILY
Refills: 0 | Status: DISCONTINUED | OUTPATIENT
Start: 2022-11-05 | End: 2022-11-11

## 2022-11-05 RX ORDER — ISOSORBIDE MONONITRATE 60 MG/1
30 TABLET, EXTENDED RELEASE ORAL DAILY
Refills: 0 | Status: DISCONTINUED | OUTPATIENT
Start: 2022-11-05 | End: 2022-11-11

## 2022-11-05 RX ORDER — INSULIN LISPRO 100/ML
VIAL (ML) SUBCUTANEOUS
Refills: 0 | Status: DISCONTINUED | OUTPATIENT
Start: 2022-11-05 | End: 2022-11-10

## 2022-11-05 RX ORDER — DEXTROSE 50 % IN WATER 50 %
12.5 SYRINGE (ML) INTRAVENOUS ONCE
Refills: 0 | Status: DISCONTINUED | OUTPATIENT
Start: 2022-11-05 | End: 2022-11-10

## 2022-11-05 RX ORDER — DEXTROSE 50 % IN WATER 50 %
15 SYRINGE (ML) INTRAVENOUS ONCE
Refills: 0 | Status: DISCONTINUED | OUTPATIENT
Start: 2022-11-05 | End: 2022-11-10

## 2022-11-05 RX ORDER — ATORVASTATIN CALCIUM 80 MG/1
10 TABLET, FILM COATED ORAL AT BEDTIME
Refills: 0 | Status: DISCONTINUED | OUTPATIENT
Start: 2022-11-05 | End: 2022-11-11

## 2022-11-05 RX ORDER — LEVOTHYROXINE SODIUM 125 MCG
25 TABLET ORAL DAILY
Refills: 0 | Status: DISCONTINUED | OUTPATIENT
Start: 2022-11-05 | End: 2022-11-11

## 2022-11-05 RX ADMIN — ISOSORBIDE MONONITRATE 30 MILLIGRAM(S): 60 TABLET, EXTENDED RELEASE ORAL at 17:03

## 2022-11-05 RX ADMIN — Medication 650 MILLIGRAM(S): at 21:08

## 2022-11-05 RX ADMIN — Medication 650 MILLIGRAM(S): at 22:00

## 2022-11-05 RX ADMIN — ATORVASTATIN CALCIUM 10 MILLIGRAM(S): 80 TABLET, FILM COATED ORAL at 21:09

## 2022-11-05 NOTE — ED ADULT NURSE NOTE - NSICDXPASTMEDICALHX_GEN_ALL_CORE_FT
PAST MEDICAL HISTORY:  CKD (chronic kidney disease) stage 3, GFR 30-59 ml/min     Coronary artery disease involving native heart without angina pectoris, unspecified vessel or lesion type     Diabetes     HTN (hypertension)     Hyperlipidemia, unspecified hyperlipidemia type     Hypothyroidism, unspecified type

## 2022-11-05 NOTE — ED PROVIDER NOTE - PHYSICAL EXAMINATION
General: NAD, well appearing  HEENT: Normocephalic, atraumatic  Neck: No apparent stiffness or JVD  Pulm: Chest wall symmetric and nontender, lungs clear to ascultation   Cardiac: Regular rate and regular rhythm  Abdomen: Nontender and nondistended  Rectal, blood in vault, no hemorrhoid or fissure  Skin: Skin is warm, dry and intact without rashes or lesions.  Neuro: No motor or sensory deficits   MSK: No deformity or tenderness

## 2022-11-05 NOTE — CONSULT NOTE ADULT - NS ATTEND AMEND GEN_ALL_CORE FT
I  Saw and examined the pt with PA   Patient with chronic constipation , Recent rectal bleeding   Abnormal rectal exam- solid by soft rectal lesion? polyp. + blood tinged   Hgb stable  Colonoscopy normal.Pt with CABG .  Has not seen cardiology in years. Needs medical clearance for colonoscopy . Will leave need for cardiac clearance at the discretion of hospitalist

## 2022-11-05 NOTE — CONSULT NOTE ADULT - ASSESSMENT
Patient is a 86 yo M w/ PMH of CAD s/p CABG 2011, Vertigo, DM, HTN, Colon polyps, CKD, hypothyroidism presenting with chief complaint of rectal bleeding.

## 2022-11-05 NOTE — CONSULT NOTE ADULT - SUBJECTIVE AND OBJECTIVE BOX
MARIA CASTILLO  [id number]        HPI:  86 yo M PMHx CAD s/p CABG, Vertigo, DM, HTN, HLD, colon polyps presenting with chief complaint of rectal bleeding. Patient states he was in his normal state of health and started experiencing rectal bleeding starting 2 weeks ago. Symptoms associated with abdominal pain. He also had symptoms of lightheadedness/dizziness which he states is chronic for 4 years. Bleeding worsened yesterday, described as mixed with bloody stool. He states he had 2 colonoscopies done many years ago and was told he had "cancerous colon polyps" which were removed. He has not followed up with GI recently. Patient also c/o a lot of weakness now.  No other specific c/o.  Denies CP, SOB, palps, syncope.  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  He was placed on Amlodipine but had leg swelling and was d/c'ed.        ALLERGIES:  iodine (Anaphylaxis)      PAST MEDICAL & SURGICAL HISTORY:  Hypothyroidism, unspecified type  CKD (chronic kidney disease) stage 3, GFR 30-59 ml/min  Otherwise, as noted above      MEDICATIONS (HOME):  Home Medications:  asa 81mg qd  febuxostat 40 mg oral tablet: 1 tab(s) orally once a day (05 Nov 2022 12:29)  isosorbide mononitrate 30 mg oral tablet, extended release: 1 tab(s) orally once a day (in the morning) (05 Nov 2022 12:29)  levocetirizine 5 mg oral tablet: 1 tab(s) orally once a day (in the evening) (05 Nov 2022 12:29)  levothyroxine 25 mcg (0.025 mg) oral tablet: 1 tab(s) orally once a day (05 Nov 2022 12:29)  pravastatin 40 mg oral tablet: 1 tab(s) orally once a day (05 Nov 2022 12:29)      SOCIAL HISTORY:  Patient denies alcohol, tobacco, drug use    FAMILY HISTORY:  No pertinent family history in first degree relatives        ROS:  Patient denies cough, and other than noted above full ROS is unremarkable      PHYSICAL EXAM:  Vital Signs Last 24 Hrs  T(C): 36.9 (05 Nov 2022 15:24), Max: 36.9 (05 Nov 2022 15:24)  T(F): 98.4 (05 Nov 2022 15:24), Max: 98.4 (05 Nov 2022 15:24)  HR: 51 (05 Nov 2022 15:24) (46 - 56)  BP: 148/76 (05 Nov 2022 15:24) (148/76 - 168/85)  BP(mean): --  RR: 18 (05 Nov 2022 15:24) (18 - 18)  SpO2: 99% (05 Nov 2022 15:24) (98% - 99%)  General: Patient comfortable in NAD  HEENT: NCAT, mmm, EOMI  Neck: no JVD, no carotid bruits  CVS: nl s1, split s2, no s3, no s4, no murmur or rubs, aleksandr, irregular  Chest: CTA b/l  Abdomen: soft, nt/nd  Extremities: Trace pedal edema b/l  Neuro: A&O x3  Psych: Normal affect      ECG:      LABS:                        11.8   6.50  )-----------( 217      ( 05 Nov 2022 08:43 )             36.5     11-05    141  |  107  |  21.8<H>  ----------------------------<  120<H>  4.0   |  23.0  |  1.77<H>    Ca    8.8      05 Nov 2022 08:43    TPro  5.7<L>  /  Alb  3.3  /  TBili  0.6  /  DBili  x   /  AST  19  /  ALT  7   /  AlkPhos  57  11-05    CARDIAC MARKERS ( 05 Nov 2022 08:43 )  x     / <0.01 ng/mL / x     / x     / x          PT/INR - ( 05 Nov 2022 08:43 )   PT: 12.9 sec;   INR: 1.11 ratio         PTT - ( 05 Nov 2022 08:43 )  PTT:31.2 sec      RADIOLOGY:        Assessment:  87yMale with PMHx p/w    Plan:  1.         MARIA CASTILLO  [id number]        HPI:  86 yo M PMHx CAD s/p CABG, Vertigo, DM, HTN, HLD, colon polyps presenting with chief complaint of rectal bleeding. Patient states he was in his normal state of health and started experiencing rectal bleeding starting 2 weeks ago. Symptoms associated with abdominal pain. He also had symptoms of lightheadedness/dizziness which he states is chronic for 4 years. Bleeding worsened yesterday, described as mixed with bloody stool. He states he had 2 colonoscopies done many years ago and was told he had "cancerous colon polyps" which were removed. He has not followed up with GI recently. Patient also c/o a lot of weakness now.  No other specific c/o.  Denies CP, SOB, palps, syncope.  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  He was placed on Amlodipine but had leg swelling and was d/c'ed.        ALLERGIES:  iodine (Anaphylaxis)      PAST MEDICAL & SURGICAL HISTORY:  Hypothyroidism, unspecified type  CKD (chronic kidney disease) stage 3, GFR 30-59 ml/min  Otherwise, as noted above      MEDICATIONS (HOME):  Home Medications:  asa 81mg qd  febuxostat 40 mg oral tablet: 1 tab(s) orally once a day (05 Nov 2022 12:29)  isosorbide mononitrate 30 mg oral tablet, extended release: 1 tab(s) orally once a day (in the morning) (05 Nov 2022 12:29)  levocetirizine 5 mg oral tablet: 1 tab(s) orally once a day (in the evening) (05 Nov 2022 12:29)  levothyroxine 25 mcg (0.025 mg) oral tablet: 1 tab(s) orally once a day (05 Nov 2022 12:29)  pravastatin 40 mg oral tablet: 1 tab(s) orally once a day (05 Nov 2022 12:29)      SOCIAL HISTORY:  Patient denies alcohol, tobacco, drug use    FAMILY HISTORY:  No pertinent family history in first degree relatives        ROS:  Patient denies cough, and other than noted above full ROS is unremarkable      PHYSICAL EXAM:  Vital Signs Last 24 Hrs  T(C): 36.9 (05 Nov 2022 15:24), Max: 36.9 (05 Nov 2022 15:24)  T(F): 98.4 (05 Nov 2022 15:24), Max: 98.4 (05 Nov 2022 15:24)  HR: 51 (05 Nov 2022 15:24) (46 - 56)  BP: 148/76 (05 Nov 2022 15:24) (148/76 - 168/85)  BP(mean): --  RR: 18 (05 Nov 2022 15:24) (18 - 18)  SpO2: 99% (05 Nov 2022 15:24) (98% - 99%)  General: Patient comfortable in NAD  HEENT: NCAT, mmm, EOMI  Neck: no JVD, no carotid bruits  CVS: nl s1, split s2, no s3, no s4, no murmur or rubs, aleksandr, irregular  Chest: CTA b/l  Abdomen: soft, nt/nd  Extremities: Trace pedal edema b/l  Neuro: A&O x3  Psych: Normal affect      ECG:  SR with 2nd degree Type II AVB.  No ST-T wave changes.    LABS:                        11.8   6.50  )-----------( 217      ( 05 Nov 2022 08:43 )             36.5     11-05    141  |  107  |  21.8<H>  ----------------------------<  120<H>  4.0   |  23.0  |  1.77<H>    Ca    8.8      05 Nov 2022 08:43    TPro  5.7<L>  /  Alb  3.3  /  TBili  0.6  /  DBili  x   /  AST  19  /  ALT  7   /  AlkPhos  57  11-05    CARDIAC MARKERS ( 05 Nov 2022 08:43 )  x     / <0.01 ng/mL / x     / x     / x          PT/INR - ( 05 Nov 2022 08:43 )   PT: 12.9 sec;   INR: 1.11 ratio         PTT - ( 05 Nov 2022 08:43 )  PTT:31.2 sec      RADIOLOGY:  CT A/P:  Posterior rectal wall thickening, with mild stranding in the perirectal fat.  Recommend further clinical correlation and colonoscopy to evaluate for rectal mass/neoplasm.        Assessment:  86 yo M PMHx CAD s/p CABG, Vertigo, DM, HTN, HLD, colon polyps presenting with chief complaint of rectal bleeding. Patient states he was in his normal state of health and started experiencing rectal bleeding starting 2 weeks ago. Bleeding worsened and came to ER.  In ER noted to be aleksandr and with some heart block.  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  Patient with chronic dizziness and bradycardia unclear if related.  BB had been previously d/c'ed.  No evidence of CHF/ACS.    Plan:  1. Tele  2. EP eval.  3. Consider colonoscopy based on EP eval.  May need PPM.  4. Check echo.  5. No AVN blockers.  6. Hold ASA with bleeding.  7. Continue other current CV meds at current doses as PTA.  8. GI f/u.    Will follow.  Thanks!

## 2022-11-05 NOTE — CONSULT NOTE ADULT - SUBJECTIVE AND OBJECTIVE BOX
Patient is a 87y old  Male who presents with a chief complaint of Rectal bleeding (05 Nov 2022 12:43)      HPI:  Patient is a 88 yo M w/ PMH of CAD s/p CABG 2011, Vertigo, DM, HTN, Colon polyps, CKD, hypothyroidism presenting with chief complaint of rectal bleeding. Pt states he has a chronic h/o constipation. Used to take Citrucel daily but stopped about 2 months ago due to fecal incontinence and loose stools. He was having 4 loose BMs/day. Over the last 2 weeks, started noticing BRBPR when he wiped and also passing blood per rectum unrelated to BM. States he wears adult diapers due to blood/feces leakage from the rectum. He is also experiencing rectal pain. He has had colonoscopies in the past, most recent was several years ago.     Patient with a cardiac history but states he has not see cardiology outpatient recently.     Hgb 11.8gm. Cr 1.77. CT a/p done in ER showed posterior rectal wall thickening with mild stranding and perirectal fat.     No abdominal or epigastric pain. No nausea, vomiting or hematemesis; No diarrhea. +constipation. No melena. +hematochezia.      REVIEW OF SYSTEMS:  Constitutional: No fever, weight loss or fatigue  ENMT:  No difficulty hearing, tinnitus, vertigo; No sinus or throat pain  Respiratory: No cough, wheezing, chills or hemoptysis  Cardiovascular: No chest pain, palpitations, dizziness or leg swelling  Gastrointestinal: see HPI  Skin: No itching, burning, rashes or lesions   Musculoskeletal: No joint pain or swelling; No muscle, back or extremity pain    PAST MEDICAL & SURGICAL HISTORY:  Coronary artery disease involving native heart without angina pectoris, unspecified vessel or lesion type      Hypothyroidism, unspecified type      Hyperlipidemia, unspecified hyperlipidemia type      CKD (chronic kidney disease) stage 3, GFR 30-59 ml/min      HTN (hypertension)      Diabetes      S/P CABG (coronary artery bypass graft)          FAMILY HISTORY:  No pertinent family history in first degree relatives        SOCIAL HISTORY:  Smoking Status: [ ] Current, [ ] Former, [ ] Never  Pack Years:  [  ] EtOH  [  ] IVDA    MEDICATIONS:  MEDICATIONS  (STANDING):  atorvastatin 10 milliGRAM(s) Oral at bedtime  dextrose 5%. 1000 milliLiter(s) (50 mL/Hr) IV Continuous <Continuous>  dextrose 5%. 1000 milliLiter(s) (100 mL/Hr) IV Continuous <Continuous>  dextrose 50% Injectable 25 Gram(s) IV Push once  dextrose 50% Injectable 12.5 Gram(s) IV Push once  dextrose 50% Injectable 25 Gram(s) IV Push once  glucagon  Injectable 1 milliGRAM(s) IntraMuscular once  insulin lispro (ADMELOG) corrective regimen sliding scale   SubCutaneous three times a day before meals  isosorbide   mononitrate ER Tablet (IMDUR) 30 milliGRAM(s) Oral daily  levothyroxine 25 MICROGram(s) Oral daily  loratadine 10 milliGRAM(s) Oral daily    MEDICATIONS  (PRN):  acetaminophen     Tablet .. 650 milliGRAM(s) Oral every 6 hours PRN Temp greater or equal to 38C (100.4F), Mild Pain (1 - 3)  aluminum hydroxide/magnesium hydroxide/simethicone Suspension 30 milliLiter(s) Oral every 4 hours PRN Dyspepsia  dextrose Oral Gel 15 Gram(s) Oral once PRN Blood Glucose LESS THAN 70 milliGRAM(s)/deciliter  melatonin 3 milliGRAM(s) Oral at bedtime PRN Insomnia  ondansetron Injectable 4 milliGRAM(s) IV Push every 8 hours PRN Nausea and/or Vomiting      Allergies    iodine (Anaphylaxis)    Intolerances        Vital Signs Last 24 Hrs  T(C): 36.8 (05 Nov 2022 11:43), Max: 36.8 (05 Nov 2022 11:43)  T(F): 98.2 (05 Nov 2022 11:43), Max: 98.2 (05 Nov 2022 11:43)  HR: 46 (05 Nov 2022 11:43) (46 - 56)  BP: 155/70 (05 Nov 2022 11:43) (155/70 - 168/85)  BP(mean): --  RR: 18 (05 Nov 2022 11:43) (18 - 18)  SpO2: 99% (05 Nov 2022 11:43) (98% - 99%)    Parameters below as of 05 Nov 2022 11:43  Patient On (Oxygen Delivery Method): room air            PHYSICAL EXAM:    General: in no acute distress  HEENT: MMM, conjunctiva and sclera clear  Gastrointestinal: Soft, non-tender non-distended; Normal bowel sounds; No rebound or guarding  Extremities: Normal range of motion, No clubbing, cyanosis or edema  Rectal: external skin tags? on digital exam there was several soft but solid lesions in the rectum. Liquid stool, maroon.   Neurological: Alert and oriented x3  Skin: Warm and dry. No obvious rash      LABS:                        11.8   6.50  )-----------( 217      ( 05 Nov 2022 08:43 )             36.5                 RADIOLOGY & ADDITIONAL STUDIES:       < from: CT Abdomen and Pelvis No Cont (11.05.22 @ 09:57) >    ACC: [id number] EXAM:  CT ABDOMEN AND PELVIS                          PROCEDURE DATE:  11/05/2022          INTERPRETATION:  CLINICAL INFORMATION: Abdominal pain and rectal bleeding.    COMPARISON: None.    CONTRAST/COMPLICATIONS:  IV Contrast: None.  Oral Contrast: None.  Complications: None reported at time of exam completion.    PROCEDURE:  CT of the Abdomen and Pelvis was performed.  Sagittal and coronal reformats were performed.    FINDINGS:    LOWER CHEST:  Sternotomy, CABG.    Pleural/ parenchymal consolidation medial aspect left lower lobe, stable   from prior CT scan chest 11/3/2020.  5 mm nodule right lower lobe, stable.    The evaluation of the solid organ parenchyma is limited without   intravenous contrast.    LIVER: Within normallimits.  BILE DUCTS: Normal caliber.  GALLBLADDER: Cholelithiasis.  SPLEEN: Within normal limits.  PANCREAS: Within normal limits.  ADRENALS: Within normal limits.  KIDNEYS/URETERS: Within normal limits.    BLADDER: Within normal limits.  REPRODUCTIVE ORGANS: Prostate mildly enlarged.    BOWEL:   Colonic diverticulosis, without CT evidence of diverticulitis.  Circumferential posterior rectal wall thickening. There is minimal   stranding in the perirectal fat.  Possible tiny, 3 mm lymph node in the left mesorectum.   Appendix within normal limits.  PERITONEUM: No ascites.  Presacral stranding.  Faint haziness at the root of the small bowel mesentery.  Possible tiny calcified lymph node anterior mesentery.  Probable 8 mm mesenteric lymph node medial left mid abdomen at the level   of the kidney (3:65).    VESSELS: Atherosclerotic changes.  RETROPERITONEUM/LYMPH NODES:  Small subcentimeter short axis left common iliac chain lymph nodes.    ABDOMINAL WALL:    Fat-containing left inguinal lymph node.    3.5 cm subcutaneous fluid collection right inguinal region.  Small bilateral inguinal lymph nodes.    BONES:  Degenerative changes.      IMPRESSION:    Posterior rectal wall thickening, with mild stranding in the perirectal   fat.  Recommend further clinical correlation and colonoscopy to evaluate for   rectal mass/neoplasm.    Other findings as discussed above.        VERTEBRAL BODY ANALYSIS: No Vertebral fracture or low bone density   identified.        --- End of Report ---            KRISTIAN MART MD; Attending Radiologist  This document has been electronically signed. Nov 5 2022 11:22AM    < end of copied text >   Patient is a 87y old  Male who presents with a chief complaint of Rectal bleeding (05 Nov 2022 12:43)      HPI:   ollow with Dr fine      Patient is a 88 yo M w/ PMH of CAD s/p CABG 2011 [facility] [place] [facility]  Vertigo, DM, HTN, Colon polyps, CKD, hypothyroidism presenting with chief complaint of rectal bleeding. Pt states he has a chronic h/o constipation. Used to take Citrucel daily but stopped about 2 months ago due to fecal incontinence and loose stools. He was having 4 loose BMs/day. Over the last 2 weeks, started noticing BRBPR when he wiped and also passing blood per rectum unrelated to BM. States he wears adult diapers due to blood/feces leakage from the rectum. He is also experiencing rectal pain. He has had colonoscopies in the past, most recent was several years ago.  Follows with Dr fine     Patient with a cardiac history but states he has not see cardiology outpatient recently.     Hgb 11.8gm. Cr 1.77. CT a/p done in ER showed posterior rectal wall thickening with mild stranding and perirectal fat.     No abdominal or epigastric pain. No nausea, vomiting or hematemesis; No diarrhea. +constipation. No melena. +hematochezia.      REVIEW OF SYSTEMS:  Constitutional: No fever, weight loss or fatigue  ENMT:  No difficulty hearing, tinnitus, vertigo; No sinus or throat pain  Respiratory: No cough, wheezing, chills or hemoptysis  Cardiovascular: No chest pain, palpitations, dizziness or leg swelling  Gastrointestinal: see HPI  Skin: No itching, burning, rashes or lesions   Musculoskeletal: No joint pain or swelling; No muscle, back or extremity pain    PAST MEDICAL & SURGICAL HISTORY:  Coronary artery disease involving native heart without angina pectoris, unspecified vessel or lesion type      Hypothyroidism, unspecified type      Hyperlipidemia, unspecified hyperlipidemia type      CKD (chronic kidney disease) stage 3, GFR 30-59 ml/min      HTN (hypertension)      Diabetes      S/P CABG (coronary artery bypass graft)          FAMILY HISTORY:  No pertinent family history in first degree relatives        SOCIAL HISTORY:  Smoking Status: [ ] Current, [ ] Former, [ ] Never  Pack Years:  [  ] EtOH-no  [  ] IVDA    MEDICATIONS:  MEDICATIONS  (STANDING):  atorvastatin 10 milliGRAM(s) Oral at bedtime  dextrose 5%. 1000 milliLiter(s) (50 mL/Hr) IV Continuous <Continuous>  dextrose 5%. 1000 milliLiter(s) (100 mL/Hr) IV Continuous <Continuous>  dextrose 50% Injectable 25 Gram(s) IV Push once  dextrose 50% Injectable 12.5 Gram(s) IV Push once  dextrose 50% Injectable 25 Gram(s) IV Push once  glucagon  Injectable 1 milliGRAM(s) IntraMuscular once  insulin lispro (ADMELOG) corrective regimen sliding scale   SubCutaneous three times a day before meals  isosorbide   mononitrate ER Tablet (IMDUR) 30 milliGRAM(s) Oral daily  levothyroxine 25 MICROGram(s) Oral daily  loratadine 10 milliGRAM(s) Oral daily    MEDICATIONS  (PRN):  acetaminophen     Tablet .. 650 milliGRAM(s) Oral every 6 hours PRN Temp greater or equal to 38C (100.4F), Mild Pain (1 - 3)  aluminum hydroxide/magnesium hydroxide/simethicone Suspension 30 milliLiter(s) Oral every 4 hours PRN Dyspepsia  dextrose Oral Gel 15 Gram(s) Oral once PRN Blood Glucose LESS THAN 70 milliGRAM(s)/deciliter  melatonin 3 milliGRAM(s) Oral at bedtime PRN Insomnia  ondansetron Injectable 4 milliGRAM(s) IV Push every 8 hours PRN Nausea and/or Vomiting      Allergies    iodine (Anaphylaxis)    Intolerances        Vital Signs Last 24 Hrs  T(C): 36.8 (05 Nov 2022 11:43), Max: 36.8 (05 Nov 2022 11:43)  T(F): 98.2 (05 Nov 2022 11:43), Max: 98.2 (05 Nov 2022 11:43)  HR: 46 (05 Nov 2022 11:43) (46 - 56)  BP: 155/70 (05 Nov 2022 11:43) (155/70 - 168/85)  BP(mean): --  RR: 18 (05 Nov 2022 11:43) (18 - 18)  SpO2: 99% (05 Nov 2022 11:43) (98% - 99%)    Parameters below as of 05 Nov 2022 11:43  Patient On (Oxygen Delivery Method): room air            PHYSICAL EXAM:    General: in no acute distress  HEENT: MMM, conjunctiva and sclera clear  Gastrointestinal: Soft, non-tender non-distended; Normal bowel sounds; No rebound or guarding  Extremities: Normal range of motion, No clubbing, cyanosis or edema  Rectal: external skin tags? on digital exam there was several soft but solid lesions in the rectum. Liquid stool, blood tinged   Neurological: Alert and oriented x3  Skin: Warm and dry. No obvious rash        LABS:                        11.8   6.50  )-----------( 217      ( 05 Nov 2022 08:43 )             36.5                 RADIOLOGY & ADDITIONAL STUDIES:       < from: CT Abdomen and Pelvis No Cont (11.05.22 @ 09:57) >    ACC: [id number] EXAM:  CT ABDOMEN AND PELVIS                          PROCEDURE DATE:  11/05/2022          INTERPRETATION:  CLINICAL INFORMATION: Abdominal pain and rectal bleeding.    COMPARISON: None.    CONTRAST/COMPLICATIONS:  IV Contrast: None.  Oral Contrast: None.  Complications: None reported at time of exam completion.    PROCEDURE:  CT of the Abdomen and Pelvis was performed.  Sagittal and coronal reformats were performed.    FINDINGS:    LOWER CHEST:  Sternotomy, CABG.    Pleural/ parenchymal consolidation medial aspect left lower lobe, stable   from prior CT scan chest 11/3/2020.  5 mm nodule right lower lobe, stable.    The evaluation of the solid organ parenchyma is limited without   intravenous contrast.    LIVER: Within normallimits.  BILE DUCTS: Normal caliber.  GALLBLADDER: Cholelithiasis.  SPLEEN: Within normal limits.  PANCREAS: Within normal limits.  ADRENALS: Within normal limits.  KIDNEYS/URETERS: Within normal limits.    BLADDER: Within normal limits.  REPRODUCTIVE ORGANS: Prostate mildly enlarged.    BOWEL:   Colonic diverticulosis, without CT evidence of diverticulitis.  Circumferential posterior rectal wall thickening. There is minimal   stranding in the perirectal fat.  Possible tiny, 3 mm lymph node in the left mesorectum.   Appendix within normal limits.  PERITONEUM: No ascites.  Presacral stranding.  Faint haziness at the root of the small bowel mesentery.  Possible tiny calcified lymph node anterior mesentery.  Probable 8 mm mesenteric lymph node medial left mid abdomen at the level   of the kidney (3:65).    VESSELS: Atherosclerotic changes.  RETROPERITONEUM/LYMPH NODES:  Small subcentimeter short axis left common iliac chain lymph nodes.    ABDOMINAL WALL:    Fat-containing left inguinal lymph node.    3.5 cm subcutaneous fluid collection right inguinal region.  Small bilateral inguinal lymph nodes.    BONES:  Degenerative changes.      IMPRESSION:    Posterior rectal wall thickening, with mild stranding in the perirectal   fat.  Recommend further clinical correlation and colonoscopy to evaluate for   rectal mass/neoplasm.    Other findings as discussed above.        VERTEBRAL BODY ANALYSIS: No Vertebral fracture or low bone density   identified.        --- End of Report ---            KRISTIAN MART MD; Attending Radiologist  This document has been electronically signed. Nov 5 2022 11:22AM    < end of copied text >

## 2022-11-05 NOTE — ED ADULT NURSE NOTE - OBJECTIVE STATEMENT
Pt c/o bright red rectal bleeding.  Pt states that he has a lifetime history of hemorrhoids.  Reports accompanying  bilat lower abdominal pain.  Pt is sustaining HR at 50 with occasional episodes of bradycardia to 38.  Dr. Bell aware. Pt reports that his HR is ordinarily low at baseline.  Denies cp, sob.

## 2022-11-05 NOTE — H&P ADULT - NSHPPHYSICALEXAM_GEN_ALL_CORE
Vital Signs Last 24 Hrs  T(F): 98.2 (05 Nov 2022 11:43), Max: 98.2 (05 Nov 2022 11:43)  HR: 46 (05 Nov 2022 11:43) (46 - 56)  BP: 155/70 (05 Nov 2022 11:43) (155/70 - 168/85)  RR: 18 (05 Nov 2022 11:43) (18 - 18)  SpO2: 99% (05 Nov 2022 11:43) (98% - 99%)    Physical Exam:  Constitutional: NAD  HEENT: NC/AT  Respiratory: CTA bilaterally, symmetrical chest rise  Cardiovascular: RRR, no m/g/r  Gastrointestinal: Mild ttp of lower quadrants, Soft, non-distended, BS+  Vascular: 2+ peripheral pulses  Neurological: A/O x 3, no focal neurological deficits  Psych: Fair mood/affect  Musculoskeletal: No edema, cyanosis, deformities. ROM normal  Skin: No obvious rash, lesions. No jaundice.

## 2022-11-05 NOTE — ED PROVIDER NOTE - CLINICAL SUMMARY MEDICAL DECISION MAKING FREE TEXT BOX
85M PMH CAD, CABG, vertigo, DM, HTN presents to ED for 2 weeks of abdominal pain and rectal bleeding with lightheadedness, worse today.  EKG labs and imaging performed to evaluate the patient. 85M PMH CAD, CABG, vertigo, DM, HTN presents to ED for 2 weeks of abdominal pain and rectal bleeding with lightheadedness, worse today.  EKG labs and imaging performed to evaluate the patient. Admit to medicine with GI and cardiology following for workup and management.

## 2022-11-05 NOTE — CONSULT NOTE ADULT - PROBLEM SELECTOR RECOMMENDATION 9
h/o chronic constipation  JORGE showed several soft, but solid rectal lesions - possible pseudopolyps?  CT a/p results appreciated  Will plan for colonoscopy next week  Will need cardiac clearance prior to colonoscopy h/o chronic constipation  JORGE showed several soft, but solid rectal lesions - possible pseudopolyps?  CT a/p results appreciated- proctitis   Will plan for colonoscopy next week  Will need cardiac clearance prior to colonoscopy. Pt states his wife will know the name of the cardiologist

## 2022-11-05 NOTE — ED PROVIDER NOTE - ATTENDING CONTRIBUTION TO CARE
I personally saw the patient with the resident, and completed the key components of the history and physical exam. I then discussed the management plan with the resident.    88 y/o M CAD, CABG, not on AC, CKD presents for abdominal pain and rectal bleeding, lightheadedness for 2 weeks.    I agree with exam as documented.    labs, CT, patient with significant drop in hgb, guaiac positive - CT shows mass - will require admission.

## 2022-11-05 NOTE — H&P ADULT - HISTORY OF PRESENT ILLNESS
Patient is a 88 yo M w/ PMH of CAD s/p CABG, Vertigo, DM, HTN, Colon polyps presenting with chief complaint of rectal bleeding. Patient states he was in his normal state of health and started experiencing rectal bleeding starting 2 weeks ago. Symptoms associated with abdominal pain. He also had symptoms of lightheadedness which he states is chronic for 4 years. He noticed his bleeding worsened yesterday, described as mixed with bloody stool. He states he had 2 colonoscopies done many years ago and was told he had "cancerous colon polyps" which were removed. He has not followed up with GI recently. Patient denies fever, chills, chest pain, palpitations, orthopnea, PND, SOB, cough, wheezing, abdominal pain, nausea, vomiting, diarrhea, constipation, melena, hematemesis, urinary complaints, syncope, calf tenderness, paresthesias.

## 2022-11-06 LAB
A1C WITH ESTIMATED AVERAGE GLUCOSE RESULT: 5.4 % — SIGNIFICANT CHANGE UP (ref 4–5.6)
ALBUMIN SERPL ELPH-MCNC: 3.5 G/DL — SIGNIFICANT CHANGE UP (ref 3.3–5.2)
ALP SERPL-CCNC: 60 U/L — SIGNIFICANT CHANGE UP (ref 40–120)
ALT FLD-CCNC: 7 U/L — SIGNIFICANT CHANGE UP
ANION GAP SERPL CALC-SCNC: 12 MMOL/L — SIGNIFICANT CHANGE UP (ref 5–17)
AST SERPL-CCNC: 20 U/L — SIGNIFICANT CHANGE UP
BILIRUB SERPL-MCNC: 0.9 MG/DL — SIGNIFICANT CHANGE UP (ref 0.4–2)
BUN SERPL-MCNC: 19.8 MG/DL — SIGNIFICANT CHANGE UP (ref 8–20)
CALCIUM SERPL-MCNC: 8.8 MG/DL — SIGNIFICANT CHANGE UP (ref 8.4–10.5)
CHLORIDE SERPL-SCNC: 108 MMOL/L — SIGNIFICANT CHANGE UP (ref 96–108)
CO2 SERPL-SCNC: 22 MMOL/L — SIGNIFICANT CHANGE UP (ref 22–29)
CREAT SERPL-MCNC: 1.74 MG/DL — HIGH (ref 0.5–1.3)
EGFR: 38 ML/MIN/1.73M2 — LOW
ESTIMATED AVERAGE GLUCOSE: 108 MG/DL — SIGNIFICANT CHANGE UP (ref 68–114)
GLUCOSE BLDC GLUCOMTR-MCNC: 103 MG/DL — HIGH (ref 70–99)
GLUCOSE BLDC GLUCOMTR-MCNC: 173 MG/DL — HIGH (ref 70–99)
GLUCOSE BLDC GLUCOMTR-MCNC: 97 MG/DL — SIGNIFICANT CHANGE UP (ref 70–99)
GLUCOSE BLDC GLUCOMTR-MCNC: 99 MG/DL — SIGNIFICANT CHANGE UP (ref 70–99)
GLUCOSE SERPL-MCNC: 100 MG/DL — HIGH (ref 70–99)
HCT VFR BLD CALC: 38.6 % — LOW (ref 39–50)
HGB BLD-MCNC: 12.4 G/DL — LOW (ref 13–17)
MAGNESIUM SERPL-MCNC: 1.7 MG/DL — SIGNIFICANT CHANGE UP (ref 1.6–2.6)
MCHC RBC-ENTMCNC: 29.6 PG — SIGNIFICANT CHANGE UP (ref 27–34)
MCHC RBC-ENTMCNC: 32.1 GM/DL — SIGNIFICANT CHANGE UP (ref 32–36)
MCV RBC AUTO: 92.1 FL — SIGNIFICANT CHANGE UP (ref 80–100)
PHOSPHATE SERPL-MCNC: 3.4 MG/DL — SIGNIFICANT CHANGE UP (ref 2.4–4.7)
PLATELET # BLD AUTO: 237 K/UL — SIGNIFICANT CHANGE UP (ref 150–400)
POTASSIUM SERPL-MCNC: 4.3 MMOL/L — SIGNIFICANT CHANGE UP (ref 3.5–5.3)
POTASSIUM SERPL-SCNC: 4.3 MMOL/L — SIGNIFICANT CHANGE UP (ref 3.5–5.3)
PROT SERPL-MCNC: 6 G/DL — LOW (ref 6.6–8.7)
RBC # BLD: 4.19 M/UL — LOW (ref 4.2–5.8)
RBC # FLD: 13.8 % — SIGNIFICANT CHANGE UP (ref 10.3–14.5)
SODIUM SERPL-SCNC: 142 MMOL/L — SIGNIFICANT CHANGE UP (ref 135–145)
WBC # BLD: 8.98 K/UL — SIGNIFICANT CHANGE UP (ref 3.8–10.5)
WBC # FLD AUTO: 8.98 K/UL — SIGNIFICANT CHANGE UP (ref 3.8–10.5)

## 2022-11-06 PROCEDURE — 99233 SBSQ HOSP IP/OBS HIGH 50: CPT

## 2022-11-06 PROCEDURE — 93306 TTE W/DOPPLER COMPLETE: CPT | Mod: 26

## 2022-11-06 PROCEDURE — 99223 1ST HOSP IP/OBS HIGH 75: CPT

## 2022-11-06 PROCEDURE — 99232 SBSQ HOSP IP/OBS MODERATE 35: CPT

## 2022-11-06 RX ORDER — HYDRALAZINE HCL 50 MG
25 TABLET ORAL THREE TIMES A DAY
Refills: 0 | Status: DISCONTINUED | OUTPATIENT
Start: 2022-11-06 | End: 2022-11-11

## 2022-11-06 RX ORDER — MAGNESIUM SULFATE 500 MG/ML
2 VIAL (ML) INJECTION ONCE
Refills: 0 | Status: COMPLETED | OUTPATIENT
Start: 2022-11-06 | End: 2022-11-06

## 2022-11-06 RX ORDER — SOD SULF/SODIUM/NAHCO3/KCL/PEG
4000 SOLUTION, RECONSTITUTED, ORAL ORAL ONCE
Refills: 0 | Status: COMPLETED | OUTPATIENT
Start: 2022-11-06 | End: 2022-11-06

## 2022-11-06 RX ORDER — MORPHINE SULFATE 50 MG/1
2 CAPSULE, EXTENDED RELEASE ORAL ONCE
Refills: 0 | Status: DISCONTINUED | OUTPATIENT
Start: 2022-11-06 | End: 2022-11-06

## 2022-11-06 RX ORDER — OXYCODONE HYDROCHLORIDE 5 MG/1
5 TABLET ORAL ONCE
Refills: 0 | Status: DISCONTINUED | OUTPATIENT
Start: 2022-11-06 | End: 2022-11-06

## 2022-11-06 RX ADMIN — Medication 25 GRAM(S): at 12:11

## 2022-11-06 RX ADMIN — Medication 25 MILLIGRAM(S): at 22:15

## 2022-11-06 RX ADMIN — LORATADINE 10 MILLIGRAM(S): 10 TABLET ORAL at 12:07

## 2022-11-06 RX ADMIN — ISOSORBIDE MONONITRATE 30 MILLIGRAM(S): 60 TABLET, EXTENDED RELEASE ORAL at 12:07

## 2022-11-06 RX ADMIN — Medication 4000 MILLILITER(S): at 17:50

## 2022-11-06 RX ADMIN — OXYCODONE HYDROCHLORIDE 5 MILLIGRAM(S): 5 TABLET ORAL at 02:15

## 2022-11-06 RX ADMIN — Medication 25 MILLIGRAM(S): at 14:29

## 2022-11-06 RX ADMIN — Medication 25 MICROGRAM(S): at 05:11

## 2022-11-06 RX ADMIN — OXYCODONE HYDROCHLORIDE 5 MILLIGRAM(S): 5 TABLET ORAL at 01:33

## 2022-11-06 RX ADMIN — AMLODIPINE BESYLATE 5 MILLIGRAM(S): 2.5 TABLET ORAL at 05:10

## 2022-11-06 RX ADMIN — ATORVASTATIN CALCIUM 10 MILLIGRAM(S): 80 TABLET, FILM COATED ORAL at 22:15

## 2022-11-06 NOTE — CONSULT NOTE ADULT - ASSESSMENT
Patient is a 88 yo M w/ PMH of CAD s/p CABG 2011 and PCI prior to that, Vertigo, DM, HTN, Colon polyps presenting with chief complaint of rectal bleeding. Patient states he was in his normal state of health and started experiencing rectal bleeding starting 2 weeks ago. Symptoms associated with abdominal pain. He also had symptoms of lightheadedness which he states is chronic for 4 years. He noticed his bleeding worsened yesterday, described as mixed with bloody stool. He states he had 2 colonoscopies done many years ago and was told he had "cancerous colon polyps" which were removed. He has not followed up with GI recently. Patient denies fever, chills, chest pain, palpitations, orthopnea, PND, SOB, cough, wheezing, abdominal pain, nausea, vomiting, diarrhea, constipation, melena, hematemesis, urinary complaints, syncope, calf tenderness, paresthesias.  (05 Nov 2022 12:43)    Above appreciated from H and P:  - Patient reports not seeing cardiologist for years, but this may not be very accurate as he was likely seen by Dr. Powers sometime earlier this year. He reports slow HR in the 40s for years. Also reports stable dizziness "I feel drunk" for 4-5 years which feels worst in the morning and gets better as the day goes on. Denies syncope or CORONEL. Patient reported that a specialist (probably Dr. Powers) stopped his atenolol due to slow HR and started him on Norvasc which resulted in leg edema and so his PCP stopped Norvasc and put him back on atenolol (25 mg bid) which he was taking as by his medication list till this admission.   - He is now admitted with GIB and is awaiting colonoscopy. Cardiology and EP consulted for clearance. TTE done, report pending  - EKGs and tele reviewed and showed SR/SB with 1st degree AVB and sinus arrhythmia/sinus pauses (all < 3 seconds) and rare blocked PACs. No high grade AVB or long pauses. HR trend mostly in the 40s.     Patient with SB, 1st degree AVB, sinus arrhythmia and sinus pauses without high grade AVB, or prior syncope. Unclear if his dizziness is due to his bradycardia.   - No rhythm related contraindication for colonoscopy. Suggest to have atropin ready and use atropine 0.5 mg prn IV if bradycardia during sedation, max 2 mg in 3 hours and 3 mg in 24 hours.   - For now, will suggest to c/w Tele while admitted and can consider MCOT as o/p  - Stop atenolol and use alternative to treat BP. Probably better to avoid amlodipine too given prior leg edema.  - Follow up on TTE report    Will follow up. Above d/w Dr. Arnold from cardiology

## 2022-11-06 NOTE — PROGRESS NOTE ADULT - PROBLEM SELECTOR PLAN 1
h/o chronic constipation  JORGE showed several soft, but solid rectal lesions - possible pseudopolyps?  CT a/p results appreciated- proctitis   Will plan for colonoscopy tomorrow  Bowel prep  Appreciate  cardiac recs prior to colonoscopy  Plan discussed with pt/son at bedside h/o chronic constipation  JORGE showed several soft, but solid rectal lesions - possible polyps/pseudopolyps?  CT a/p results appreciated- proctitis   Will plan for colonoscopy tomorrow  Bowel prep  Appreciate  cardiac recs prior to colonoscopy  Plan discussed with pt/son at bedside

## 2022-11-06 NOTE — PROGRESS NOTE ADULT - SUBJECTIVE AND OBJECTIVE BOX
MARIA CASTILLO Patient is a 87y old  Male who presents with a chief complaint of Rectal bleeding (2022 11:46)     HPI:  Patient is a 88 yo M w/ PMH of CAD s/p CABG, Vertigo, DM, HTN, Colon polyps presenting with chief complaint of rectal bleeding. Patient states he was in his normal state of health and started experiencing rectal bleeding starting 2 weeks ago. Symptoms associated with abdominal pain. He also had symptoms of lightheadedness which he states is chronic for 4 years. He noticed his bleeding worsened yesterday, described as mixed with bloody stool. He states he had 2 colonoscopies done many years ago and was told he had "cancerous colon polyps" which were removed. He has not followed up with GI recently. Patient denies fever, chills, chest pain, palpitations, orthopnea, PND, SOB, cough, wheezing, abdominal pain, nausea, vomiting, diarrhea, constipation, melena, hematemesis, urinary complaints, syncope, calf tenderness, paresthesias.  (2022 12:43)    The patient was seen and evaluated elderly male - states he forgets the answers when people ask him- states his wife knows all the answers but he's having a hard time remembering what happened after he was told he has a mass- states he has been having a lot of liquid stool and blood per rectum and this time he was sure he needed to be in the hospital  The patient is in no acute distress.  Denied any fever chest pain, palpitations, shortness of breath, abdominal pain, fever, dysuria, cough, edema       Height (cm): 175.3 ( @ 17:00)  Weight (kg): 81.737 ( @ 17:00)  BMI (kg/m2): 26.6 ( @ 17:)  BSA (m2): 1.98 ( @ 17:00)I&O's Summary    2022 08:01  -  2022 07:00  --------------------------------------------------------  IN: 200 mL / OUT: 70 mL / NET: 130 mL      Allergies    iodine (Anaphylaxis)    Intolerances      HEALTH ISSUES - PROBLEM Dx:  Rectal bleeding          PAST MEDICAL & SURGICAL HISTORY:  Coronary artery disease involving native heart without angina pectoris, unspecified vessel or lesion type      Hypothyroidism, unspecified type      Hyperlipidemia, unspecified hyperlipidemia type      CKD (chronic kidney disease) stage 3, GFR 30-59 ml/min      HTN (hypertension)      Diabetes      S/P CABG (coronary artery bypass graft)              Vital Signs Last 24 Hrs  T(C): 36.4 (2022 07:41), Max: 36.7 (2022 21:06)  T(F): 97.6 (2022 07:41), Max: 98.1 (2022 21:06)  HR: 63 (2022 14:27) (45 - 63)  BP: 132/70 (2022 14:27) (106/56 - 184/84)  BP(mean): --  RR: 18 (2022 14:27) (17 - 18)  SpO2: 98% (:27) (95% - 99%)    Parameters below as of 2022 14:27  Patient On (Oxygen Delivery Method): room air    T(C): 36.4 (22 @ 07:41), Max: 36.7 (22 @ 21:06)  HR: 63 (22 @ 14:27) (45 - 63)  BP: 132/70 (22 @ 14:27) (106/56 - 184/84)  RR: 18 (22 @ 14:27) (17 - 18)  SpO2: 98% (22 @ 14:27) (95% - 99%)  Wt(kg): --    PHYSICAL EXAM:    GENERAL: NAD pleasant forgetful white male   HEAD:  Atraumatic, Normocephalic  EYES: EOMI, PERRL, conjunctiva and sclera clear  ENMT:  Moist mucous membranes,  No lesions  NECK: Supple, No JVD, Normal thyroid  NERVOUS SYSTEM:  Alert & Oriented X3,  Moves upper and lower extremities; CNS-II-XII  CHEST/LUNG: Clear to auscultation bilaterally; No rales, rhonchi, wheezing,   HEART: Regular rate and rhythm; No murmurs,   ABDOMEN: Soft, Nontender, Nondistended; Bowel sounds present  EXTREMITIES:  Peripheral Pulses, No  cyanosis, or edema  SKIN: No rashes or lesions  psychiatry- mood and affect appropriate,    acetaminophen     Tablet .. 650 milliGRAM(s) Oral every 6 hours PRN  aluminum hydroxide/magnesium hydroxide/simethicone Suspension 30 milliLiter(s) Oral every 4 hours PRN  atorvastatin 10 milliGRAM(s) Oral at bedtime  dextrose 5%. 1000 milliLiter(s) IV Continuous <Continuous>  dextrose 5%. 1000 milliLiter(s) IV Continuous <Continuous>  dextrose 50% Injectable 25 Gram(s) IV Push once  dextrose 50% Injectable 12.5 Gram(s) IV Push once  dextrose 50% Injectable 25 Gram(s) IV Push once  dextrose Oral Gel 15 Gram(s) Oral once PRN  glucagon  Injectable 1 milliGRAM(s) IntraMuscular once  hydrALAZINE 25 milliGRAM(s) Oral three times a day  insulin lispro (ADMELOG) corrective regimen sliding scale   SubCutaneous three times a day before meals  isosorbide   mononitrate ER Tablet (IMDUR) 30 milliGRAM(s) Oral daily  levothyroxine 25 MICROGram(s) Oral daily  loratadine 10 milliGRAM(s) Oral daily  melatonin 3 milliGRAM(s) Oral at bedtime PRN  ondansetron Injectable 4 milliGRAM(s) IV Push every 8 hours PRN  polyethylene glycol/electrolyte Solution. 4000 milliLiter(s) Oral once      LABS:                          12.4   8.98  )-----------( 237      ( 2022 06:55 )             38.6     11-    142  |  108  |  19.8  ----------------------------<  100<H>  4.3   |  22.0  |  1.74<H>    Ca    8.8      2022 06:55  Phos  3.4     11-  Mg     1.7     -    TPro  6.0<L>  /  Alb  3.5  /  TBili  0.9  /  DBili  x   /  AST  20  /  ALT  7   /  AlkPhos  60  11-    LIVER FUNCTIONS - ( 2022 06:55 )  Alb: 3.5 g/dL / Pro: 6.0 g/dL / ALK PHOS: 60 U/L / ALT: 7 U/L / AST: 20 U/L / GGT: x           PT/INR - ( 2022 08:43 )   PT: 12.9 sec;   INR: 1.11 ratio         PTT - ( 2022 08:43 )  PTT:31.2 sec  CARDIAC MARKERS ( 2022 08:43 )  x     / <0.01 ng/mL / x     / x     / x          Urinalysis Basic - ( 2022 18:00 )    Color: Yellow / Appearance: Clear / S.010 / pH: x  Gluc: x / Ketone: Negative  / Bili: Negative / Urobili: Negative mg/dL   Blood: x / Protein: 15 / Nitrite: Negative   Leuk Esterase: Negative / RBC: 0-2 /HPF / WBC 0-2 /HPF   Sq Epi: x / Non Sq Epi: Few / Bacteria: Few      CAPILLARY BLOOD GLUCOSE      POCT Blood Glucose.: 103 mg/dL (2022 11:57)  POCT Blood Glucose.: 99 mg/dL (2022 07:59)  POCT Blood Glucose.: 148 mg/dL (2022 21:15)  POCT Blood Glucose.: 90 mg/dL (2022 17:16)      RADIOLOGY & ADDITIONAL TESTS:      Consultant notes reviewed    Case discussed with consultant/provider/ family /patient

## 2022-11-06 NOTE — PROGRESS NOTE ADULT - SUBJECTIVE AND OBJECTIVE BOX
Chief Complaint:  Patient is a 87y old  Male who presents with a chief complaint of Rectal bleeding (2022 10:57)     patient being seen in follow-up consultation for GI bleed.     Interval Events / Subjective: Patient seen and examined at bedside, son at bedside Pt reports loose stool, blood streaks, improved but persistent  Denies abdominal pain or N/V  Agrees to proceed to colonoscopy        Review of Systems:  . Constitutional: No fever, chills,   · ENMT: no vertigo, no throat pain  . Neck: No pain or stiffness  · Respiratory and Thorax: No shortness of breath, no cough, no wheezing  · Cardiovascular: No chest pain, palpitation, no dizziness, no orthopnea,   · Gastrointestinal: see above.  · Genitourinary: No hematuria, no dysuria  · Musculoskeletal: Negative  · Neurological: no headache, no vision changes  · Psychiatric: no agitation, no anxiety  · Hematology/Lymphatics: negative  · Endocrine: negative    MEDICATIONS:   MEDICATIONS  (STANDING):  amLODIPine   Tablet 5 milliGRAM(s) Oral daily  atorvastatin 10 milliGRAM(s) Oral at bedtime  dextrose 5%. 1000 milliLiter(s) (50 mL/Hr) IV Continuous <Continuous>  dextrose 5%. 1000 milliLiter(s) (100 mL/Hr) IV Continuous <Continuous>  dextrose 50% Injectable 25 Gram(s) IV Push once  dextrose 50% Injectable 12.5 Gram(s) IV Push once  dextrose 50% Injectable 25 Gram(s) IV Push once  glucagon  Injectable 1 milliGRAM(s) IntraMuscular once  insulin lispro (ADMELOG) corrective regimen sliding scale   SubCutaneous three times a day before meals  isosorbide   mononitrate ER Tablet (IMDUR) 30 milliGRAM(s) Oral daily  levothyroxine 25 MICROGram(s) Oral daily  loratadine 10 milliGRAM(s) Oral daily  magnesium sulfate  IVPB 2 Gram(s) IV Intermittent once  polyethylene glycol/electrolyte Solution. 4000 milliLiter(s) Oral once    MEDICATIONS  (PRN):  acetaminophen     Tablet .. 650 milliGRAM(s) Oral every 6 hours PRN Temp greater or equal to 38C (100.4F), Mild Pain (1 - 3)  aluminum hydroxide/magnesium hydroxide/simethicone Suspension 30 milliLiter(s) Oral every 4 hours PRN Dyspepsia  dextrose Oral Gel 15 Gram(s) Oral once PRN Blood Glucose LESS THAN 70 milliGRAM(s)/deciliter  melatonin 3 milliGRAM(s) Oral at bedtime PRN Insomnia  ondansetron Injectable 4 milliGRAM(s) IV Push every 8 hours PRN Nausea and/or Vomiting      ALLERGIES:   Allergies    iodine (Anaphylaxis)    Intolerances        VITAL SIGNS:   Vital Signs Last 24 Hrs  T(C): 36.4 (2022 07:41), Max: 36.9 (2022 15:24)  T(F): 97.6 (2022 07:41), Max: 98.4 (2022 15:24)  HR: 55 (2022 07:41) (45 - 61)  BP: 126/59 (2022 07:41) (106/56 - 184/84)  BP(mean): --  RR: 17 (2022 07:41) (17 - 18)  SpO2: 97% (2022 07:41) (95% - 99%)    Parameters below as of 2022 07:41  Patient On (Oxygen Delivery Method): room air      I&O's Summary    2022 08:01  -  2022 07:00  --------------------------------------------------------  IN: 200 mL / OUT: 70 mL / NET: 130 mL        PHYSICAL EXAM:   Appearance: Normal	  HEENT:   Normal oral mucosa, PERRL, EOMI	  Lymphatic: No lymphadenopathy  Cardiovascular: Normal S1 S2, No JVD, No  murmurs, No edema  Respiratory: rhonchi  Psychiatry: A & O x 3, Mood & affect appropriate  Gastrointestinal:  Soft, Non-tender, + BS	  Skin: No rashes, No ecchymoses, No cyanosis	  Neurologic: Non-focal  Extremities: Normal range of motion, No clubbing, cyanosis or edema  Vascular: Peripheral pulses palpable 2+ bilaterally        LABS:  CBC Full  -  ( 2022 06:55 )  WBC Count : 8.98 K/uL  RBC Count : 4.19 M/uL  Hemoglobin : 12.4 g/dL  Hematocrit : 38.6 %  Platelet Count - Automated : 237 K/uL  Mean Cell Volume : 92.1 fl  Mean Cell Hemoglobin : 29.6 pg  Mean Cell Hemoglobin Concentration : 32.1 gm/dL  Auto Neutrophil # : x  Auto Lymphocyte # : x  Auto Monocyte # : x  Auto Eosinophil # : x  Auto Basophil # : x  Auto Neutrophil % : x  Auto Lymphocyte % : x  Auto Monocyte % : x  Auto Eosinophil % : x  Auto Basophil % : x        142  |  108  |  19.8  ----------------------------<  100<H>  4.3   |  22.0  |  1.74<H>    Ca    8.8      2022 06:55  Phos  3.4       Mg     1.7         TPro  6.0<L>  /  Alb  3.5  /  TBili  0.9  /  DBili  x   /  AST  20  /  ALT  7   /  AlkPhos  60      LIVER FUNCTIONS - ( 2022 06:55 )  Alb: 3.5 g/dL / Pro: 6.0 g/dL / ALK PHOS: 60 U/L / ALT: 7 U/L / AST: 20 U/L / GGT: x           PT/INR - ( 2022 08:43 )   PT: 12.9 sec;   INR: 1.11 ratio         PTT - ( 2022 08:43 )  PTT:31.2 sec  Urinalysis Basic - ( 2022 18:00 )    Color: Yellow / Appearance: Clear / S.010 / pH: x  Gluc: x / Ketone: Negative  / Bili: Negative / Urobili: Negative mg/dL   Blood: x / Protein: 15 / Nitrite: Negative   Leuk Esterase: Negative / RBC: 0-2 /HPF / WBC 0-2 /HPF   Sq Epi: x / Non Sq Epi: Few / Bacteria: Few          RADIOLOGY & ADDITIONAL STUDIES (The following images were personally reviewed):    CT < from: CT Abdomen and Pelvis No Cont (22 @ 09:57) >  BOWEL:   Colonic diverticulosis, without CT evidence of diverticulitis.  Circumferential posterior rectal wall thickening. There is minimal   stranding in the perirectal fat.  Possible tiny, 3 mm lymph node in the left mesorectum.   Appendix within normal limits.  PERITONEUM: No ascites.  Presacral stranding.  Faint haziness at the root of the small bowel mesentery.  Possible tiny calcified lymph node anterior mesentery.  Probable 8 mm mesenteric lymph node medial left mid abdomen at the level   of the kidney (3:65).    < end of copied text >

## 2022-11-06 NOTE — CONSULT NOTE ADULT - SUBJECTIVE AND OBJECTIVE BOX
Electrophysiology Attending Consult Note    HPI:  Patient is a 88 yo M w/ PMH of CAD s/p CABG  and PCI prior to that, Vertigo, DM, HTN, Colon polyps presenting with chief complaint of rectal bleeding. Patient states he was in his normal state of health and started experiencing rectal bleeding starting 2 weeks ago. Symptoms associated with abdominal pain. He also had symptoms of lightheadedness which he states is chronic for 4 years. He noticed his bleeding worsened yesterday, described as mixed with bloody stool. He states he had 2 colonoscopies done many years ago and was told he had "cancerous colon polyps" which were removed. He has not followed up with GI recently. Patient denies fever, chills, chest pain, palpitations, orthopnea, PND, SOB, cough, wheezing, abdominal pain, nausea, vomiting, diarrhea, constipation, melena, hematemesis, urinary complaints, syncope, calf tenderness, paresthesias.  (2022 12:43)    Above appreciated from H and P:  - Patient reports not seeing cardiologist for years, but this may not be very accurate as he was likely seen by Dr. Powers sometime earlier this year. He reports slow HR in the 40s for years. Also reports stable dizziness "I feel drunk" for 4-5 years which feels worst in the morning and gets better as the day goes on. Denies syncope or CORONEL. Patient reported that a specialist (probably Dr. Powers) stopped his atenolol due to slow HR and started him on Norvasc which resulted in leg edema and so his PCP stopped Norvasc and put him back on atenolol (25 mg bid) which he was taking as by his medication list till this admission.   - He is now admitted with GIB and is awaiting colonoscopy. Cardiology and EP consulted for clearance. TTE done, report pending  - EKGs and tele reviewed and showed SR/SB with 1st degree AVB and sinus arrhythmia/sinus pauses (all < 3 seconds) and rare blocked PACs. No high grade AVB or long pauses. HR trend mostly in the 40s.     PAST MEDICAL & SURGICAL HISTORY:  Coronary artery disease involving native heart without angina pectoris, unspecified vessel or lesion type  Hypothyroidism, unspecified type  Hyperlipidemia, unspecified hyperlipidemia type  CKD (chronic kidney disease) stage 3, GFR 30-59 ml/min  HTN (hypertension)  Diabetes  S/P CABG (coronary artery bypass graft)          REVIEW OF SYSTEMS:    CONSTITUTIONAL: No fever or fatigue  EYES: No eye pain, visual disturbances, or discharge  ENMT:  No difficulty hearing, tinnitus, vertigo; No sinus or throat pain  NECK: No pain or stiffness  RESPIRATORY: No cough, wheezing, chills or hemoptysis; No shortness of breath  CARDIOVASCULAR: No chest pain, palpitations, or leg swelling  GASTROINTESTINAL: No abdominal or epigastric pain. No nausea, vomiting, or hematemesis;  GENITOURINARY: No dysuria, frequency, hematuria, or incontinence  NEUROLOGICAL: No headaches, memory loss, loss of strength, numbness, or tremors  SKIN: No itching, burning, rashes, or lesions   LYMPH NODES: No enlarged glands  ENDOCRINE: No heat or cold intolerance; No hair loss  MUSCULOSKELETAL: No joint pain or swelling; No muscle, back, or extremity pain  HEME/LYMPH: No easy bruising, or bleeding gums  ALLERY AND IMMUNOLOGIC: No hives or eczema      MEDICATIONS  (STANDING):  amLODIPine   Tablet 5 milliGRAM(s) Oral daily  atorvastatin 10 milliGRAM(s) Oral at bedtime  dextrose 5%. 1000 milliLiter(s) (100 mL/Hr) IV Continuous <Continuous>  dextrose 5%. 1000 milliLiter(s) (50 mL/Hr) IV Continuous <Continuous>  dextrose 50% Injectable 25 Gram(s) IV Push once  dextrose 50% Injectable 12.5 Gram(s) IV Push once  dextrose 50% Injectable 25 Gram(s) IV Push once  glucagon  Injectable 1 milliGRAM(s) IntraMuscular once  insulin lispro (ADMELOG) corrective regimen sliding scale   SubCutaneous three times a day before meals  isosorbide   mononitrate ER Tablet (IMDUR) 30 milliGRAM(s) Oral daily  levothyroxine 25 MICROGram(s) Oral daily  loratadine 10 milliGRAM(s) Oral daily    MEDICATIONS  (PRN):  acetaminophen     Tablet .. 650 milliGRAM(s) Oral every 6 hours PRN Temp greater or equal to 38C (100.4F), Mild Pain (1 - 3)  aluminum hydroxide/magnesium hydroxide/simethicone Suspension 30 milliLiter(s) Oral every 4 hours PRN Dyspepsia  dextrose Oral Gel 15 Gram(s) Oral once PRN Blood Glucose LESS THAN 70 milliGRAM(s)/deciliter  melatonin 3 milliGRAM(s) Oral at bedtime PRN Insomnia  ondansetron Injectable 4 milliGRAM(s) IV Push every 8 hours PRN Nausea and/or Vomiting      Allergies  Iodine (Anaphylaxis)    Intolerances        SOCIAL HISTORY:  negative for smoking or alcohol abuse    FAMILY HISTORY:  No pertinent family history in first degree relatives        Vital Signs Last 24 Hrs  T(C): 36.4 (2022 07:41), Max: 36.9 (2022 15:24)  T(F): 97.6 (2022 07:41), Max: 98.4 (2022 15:24)  HR: 55 (2022 07:41) (45 - 61)  BP: 126/59 (2022 07:41) (106/56 - 184/84)  RR: 17 (2022 07:41) (17 - 18)  SpO2: 97% (2022 07:41) (95% - 99%)    Parameters below as of 2022 07:41  Patient On (Oxygen Delivery Method): room air        Physical Exam:  Constitutional: AAOx3, NAD  Neck: supple, No JVD  Cardiovascular: +S1S2 RRR, soft systolic murmurs. No rubs, gallops   Pulmonary: CTA b/l, unlabored, no wheezes, rales. rhonci  Abdomen:  soft NTND  Extremities: no edema b/l,   Neuro: non focal, speech clear, JONES x 4    LABS:                        12.4   8.98  )-----------( 237      ( 2022 06:55 )             38.6   11-    142  |  108  |  19.8  ----------------------------<  100<H>  4.3   |  22.0  |  1.74<H>    Ca    8.8      2022 06:55  Phos  3.4     11-  Mg     1.7     11-    TPro  6.0<L>  /  Alb  3.5  /  TBili  0.9  /  DBili  x   /  AST  20  /  ALT  7   /  AlkPhos  60  11-06  LIVER FUNCTIONS - ( 2022 06:55 )  Alb: 3.5 g/dL / Pro: 6.0 g/dL / ALK PHOS: 60 U/L / ALT: 7 U/L / AST: 20 U/L / GGT: x           PT/INR - ( 2022 08:43 )   PT: 12.9 sec;   INR: 1.11 ratio         PTT - ( 2022 08:43 )  PTT:31.2 secCARDIAC MARKERS ( 2022 08:43 )  x     / <0.01 ng/mL / x     / x     / x          Urinalysis Basic - ( 2022 18:00 )    Color: Yellow / Appearance: Clear / S.010 / pH: x  Gluc: x / Ketone: Negative  / Bili: Negative / Urobili: Negative mg/dL   Blood: x / Protein: 15 / Nitrite: Negative   Leuk Esterase: Negative / RBC: 0-2 /HPF / WBC 0-2 /HPF   Sq Epi: x / Non Sq Epi: Few / Bacteria: Few        RADIOLOGY & ADDITIONAL STUDIES:  < from: CT Abdomen and Pelvis No Cont (22 @ 09:57) >  IMPRESSION:    Posterior rectal wall thickening, with mild stranding in the perirectal   fat.  Recommend further clinical correlation and colonoscopy to evaluate for   rectal mass/neoplasm.    Other findings as discussed above.        VERTEBRAL BODY ANALYSIS: No Vertebral fracture or low bone density   identified.    < end of copied text >

## 2022-11-06 NOTE — PROGRESS NOTE ADULT - ASSESSMENT
86 yo M w/ PMH of CAD s/p CABG, Vertigo, DM, HTN, HLD, Colon polyps presenting with chief complaint of rectal bleeding.    Rectal bleeding- colonoscopy in am   - CT reviewed - rectal thickening  - GI consult- AM colonoscopy  - Monitor CBC, transfuse prn  - CLD diet    CAD s/p CABG, HTN, HLD  - Hold ASA  - Continue home medications  - Hold Atenolol due to bradycardia    Bradycardia off atenolol- 46-63 today -better off atenolol  SR/SB with 1st degree AVB and sinus arrhythmia/sinus pauses (all < 3 seconds)   Atropine is at the bedside     CKD3  Hydralazine 25mg TID for HTN.     hold on ACEI/ ARB and diuretics now with elevated Cr    DM2  - Not on any medications, diet controlled  - Sliding scale, FS  - Carb consistent diet    DVT ppx; SCDs      86 yo M w/ PMH of CAD s/p CABG, Vertigo, DM, HTN, HLD, Colon polyps presenting with chief complaint of rectal bleeding.    Rectal bleeding- colonoscopy in am   - CT reviewed - rectal thickening  - GI consult- AM colonoscopy  - Monitor CBC, transfuse prn  - CLD diet    CAD s/p CABG, HTN, HLD  - Hold ASA  - Continue home medications  - Hold Atenolol due to bradycardia  had Norvasc in past but had leg edema with it     Bradycardia off atenolol- 46-63 today -better off atenolol  SR/SB with 1st degree AVB and sinus arrhythmia/sinus pauses (all < 3 seconds)   Atropine is at the bedside     CKD3  Hydralazine 25mg TID for HTN.     hold on ACEI/ ARB and diuretics now with elevated Cr    DM2  - Not on any medications, diet controlled  - Sliding scale, FS  - Carb consistent diet    DVT ppx; SCDs

## 2022-11-06 NOTE — PROGRESS NOTE ADULT - SUBJECTIVE AND OBJECTIVE BOX
MARIA CASTILLO  [id number]      Chief Complaint:  Aleksandr/Heart Block/GIB    Interval History:  Patient without c/o at this time.  Denies CP, SOB, palps, dizziness, syncope.    Tele:  SR with 1st degree AVB, sinus pauses, PACs with some blocked      acetaminophen     Tablet .. 650 milliGRAM(s) Oral every 6 hours PRN  aluminum hydroxide/magnesium hydroxide/simethicone Suspension 30 milliLiter(s) Oral every 4 hours PRN  amLODIPine   Tablet 5 milliGRAM(s) Oral daily  atorvastatin 10 milliGRAM(s) Oral at bedtime  dextrose 5%. 1000 milliLiter(s) IV Continuous <Continuous>  dextrose 5%. 1000 milliLiter(s) IV Continuous <Continuous>  dextrose 50% Injectable 25 Gram(s) IV Push once  dextrose 50% Injectable 12.5 Gram(s) IV Push once  dextrose 50% Injectable 25 Gram(s) IV Push once  dextrose Oral Gel 15 Gram(s) Oral once PRN  glucagon  Injectable 1 milliGRAM(s) IntraMuscular once  insulin lispro (ADMELOG) corrective regimen sliding scale   SubCutaneous three times a day before meals  isosorbide   mononitrate ER Tablet (IMDUR) 30 milliGRAM(s) Oral daily  levothyroxine 25 MICROGram(s) Oral daily  loratadine 10 milliGRAM(s) Oral daily  melatonin 3 milliGRAM(s) Oral at bedtime PRN  ondansetron Injectable 4 milliGRAM(s) IV Push every 8 hours PRN  polyethylene glycol/electrolyte Solution. 4000 milliLiter(s) Oral once          Physical Exam:  T(C): 36.4 (11-06-22 @ 07:41), Max: 36.9 (11-05-22 @ 15:24)  HR: 60 (11-06-22 @ 12:08) (45 - 61)  BP: 130/72 (11-06-22 @ 12:08) (106/56 - 184/84)  RR: 17 (11-06-22 @ 07:41) (17 - 18)  SpO2: 97% (11-06-22 @ 07:41) (95% - 99%)  General: Comfortable in NAD  Neck: No JVD  CVS: nl s1s2, no s3, aleksandr irreg  Pulm: CTA b/l  Abd: soft, non-tender  Ext: No c/c/e  Neuro A&O x3  Psych: Normal affect      Labs:   06 Nov 2022 06:55    142    |  108    |  19.8   ----------------------------<  100    4.3     |  22.0   |  1.74     Ca    8.8        06 Nov 2022 06:55  Phos  3.4       06 Nov 2022 06:55  Mg     1.7       06 Nov 2022 06:55    TPro  6.0    /  Alb  3.5    /  TBili  0.9    /  DBili  x      /  AST  20     /  ALT  7      /  AlkPhos  60     06 Nov 2022 06:55                          12.4   8.98  )-----------( 237      ( 06 Nov 2022 06:55 )             38.6     PT/INR - ( 05 Nov 2022 08:43 )   PT: 12.9 sec;   INR: 1.11 ratio         PTT - ( 05 Nov 2022 08:43 )  PTT:31.2 sec  CARDIAC MARKERS ( 05 Nov 2022 08:43 )  x     / <0.01 ng/mL / x     / x     / x          CT A/P:  Posterior rectal wall thickening, with mild stranding in the perirectal fat.  Recommend further clinical correlation and colonoscopy to evaluate for rectal mass/neoplasm.      Echo:   1. Left ventricular ejection fraction, by visual estimation, is 65 to 70%.   2. Normal global left ventricular systolic function.   3. Normal left ventricular internal cavity size.   4. There is mild concentric left ventricular hypertrophy.   5. Normal right ventricular size and function.   6. The left atrium is normal in size.   7. Trace mitral valve regurgitation.   8. Sclerotic aortic valve with normal opening.   9. There is no evidence of pericardial effusion.        Assessment:  86 yo M PMHx CAD s/p CABG, Vertigo, DM, HTN, HLD, colon polyps presenting with chief complaint of rectal bleeding. Patient states he was in his normal state of health and started experiencing rectal bleeding starting 2 weeks ago. Bleeding worsened and came to ER.  In ER noted to be aleksandr and with some heart block.  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  Patient with chronic dizziness and bradycardia unclear if related.  BB had been previously d/c'ed.  No evidence of CHF/ACS.  -Echo with preserved EF and no significant VHD.  -EP eval with no significant 2nd degree HB.  Recommend holding BB and all AVN blockers.  No indication for PPM now.  Plan for OP monitoring initially with event.  Ok for colonoscopy with Atropine available prn.    Plan:  1. Tele  2. EP f/u appreciated.  No indication for PPM now.  3. No CV contraindication to colonoscopy.  Atropine on standby during procedure.  Monitor through procedure and post.  4. No additional CV testing now.  5. No AVN blockers.  6. Hold ASA with bleeding.  7. D/c Amlodipine as patient had issues with edema.  8. Add Hydralazine 25mg TID for HTN.  Will hold on ACEi/ARB and diuretics now with elevated Cr.    D/w Dr. Monroy.

## 2022-11-06 NOTE — PROGRESS NOTE ADULT - ASSESSMENT
86 yo M w/ PMH of CAD s/p CABG 2011, Vertigo, DM, HTN, Colon polyps, CKD, hypothyroidism presenting with chief complaint of rectal bleeding.

## 2022-11-07 ENCOUNTER — TRANSCRIPTION ENCOUNTER (OUTPATIENT)
Age: 87
End: 2022-11-07

## 2022-11-07 ENCOUNTER — RESULT REVIEW (OUTPATIENT)
Age: 87
End: 2022-11-07

## 2022-11-07 LAB
ALBUMIN SERPL ELPH-MCNC: 3.5 G/DL — SIGNIFICANT CHANGE UP (ref 3.3–5.2)
ALP SERPL-CCNC: 59 U/L — SIGNIFICANT CHANGE UP (ref 40–120)
ALT FLD-CCNC: 8 U/L — SIGNIFICANT CHANGE UP
ANION GAP SERPL CALC-SCNC: 14 MMOL/L — SIGNIFICANT CHANGE UP (ref 5–17)
AST SERPL-CCNC: 29 U/L — SIGNIFICANT CHANGE UP
BILIRUB SERPL-MCNC: 1 MG/DL — SIGNIFICANT CHANGE UP (ref 0.4–2)
BLD GP AB SCN SERPL QL: SIGNIFICANT CHANGE UP
BUN SERPL-MCNC: 24.3 MG/DL — HIGH (ref 8–20)
CALCIUM SERPL-MCNC: 9.1 MG/DL — SIGNIFICANT CHANGE UP (ref 8.4–10.5)
CHLORIDE SERPL-SCNC: 104 MMOL/L — SIGNIFICANT CHANGE UP (ref 96–108)
CO2 SERPL-SCNC: 23 MMOL/L — SIGNIFICANT CHANGE UP (ref 22–29)
CREAT SERPL-MCNC: 2.28 MG/DL — HIGH (ref 0.5–1.3)
EGFR: 27 ML/MIN/1.73M2 — LOW
GLUCOSE BLDC GLUCOMTR-MCNC: 68 MG/DL — LOW (ref 70–99)
GLUCOSE BLDC GLUCOMTR-MCNC: 90 MG/DL — SIGNIFICANT CHANGE UP (ref 70–99)
GLUCOSE BLDC GLUCOMTR-MCNC: 97 MG/DL — SIGNIFICANT CHANGE UP (ref 70–99)
GLUCOSE BLDC GLUCOMTR-MCNC: 98 MG/DL — SIGNIFICANT CHANGE UP (ref 70–99)
GLUCOSE SERPL-MCNC: 99 MG/DL — SIGNIFICANT CHANGE UP (ref 70–99)
HCT VFR BLD CALC: 35.6 % — LOW (ref 39–50)
HGB BLD-MCNC: 11.8 G/DL — LOW (ref 13–17)
MCHC RBC-ENTMCNC: 29.8 PG — SIGNIFICANT CHANGE UP (ref 27–34)
MCHC RBC-ENTMCNC: 33.1 GM/DL — SIGNIFICANT CHANGE UP (ref 32–36)
MCV RBC AUTO: 89.9 FL — SIGNIFICANT CHANGE UP (ref 80–100)
PLATELET # BLD AUTO: 222 K/UL — SIGNIFICANT CHANGE UP (ref 150–400)
POTASSIUM SERPL-MCNC: 4.4 MMOL/L — SIGNIFICANT CHANGE UP (ref 3.5–5.3)
POTASSIUM SERPL-SCNC: 4.4 MMOL/L — SIGNIFICANT CHANGE UP (ref 3.5–5.3)
PROT SERPL-MCNC: 5.9 G/DL — LOW (ref 6.6–8.7)
RBC # BLD: 3.96 M/UL — LOW (ref 4.2–5.8)
RBC # FLD: 13.7 % — SIGNIFICANT CHANGE UP (ref 10.3–14.5)
SODIUM SERPL-SCNC: 141 MMOL/L — SIGNIFICANT CHANGE UP (ref 135–145)
WBC # BLD: 7.78 K/UL — SIGNIFICANT CHANGE UP (ref 3.8–10.5)
WBC # FLD AUTO: 7.78 K/UL — SIGNIFICANT CHANGE UP (ref 3.8–10.5)

## 2022-11-07 PROCEDURE — 88342 IMHCHEM/IMCYTCHM 1ST ANTB: CPT | Mod: 26

## 2022-11-07 PROCEDURE — 99233 SBSQ HOSP IP/OBS HIGH 50: CPT

## 2022-11-07 PROCEDURE — 99232 SBSQ HOSP IP/OBS MODERATE 35: CPT

## 2022-11-07 PROCEDURE — 88305 TISSUE EXAM BY PATHOLOGIST: CPT | Mod: 26

## 2022-11-07 PROCEDURE — 45385 COLONOSCOPY W/LESION REMOVAL: CPT

## 2022-11-07 DEVICE — NAIL OSTEO 1.5X16MM STRL: Type: IMPLANTABLE DEVICE | Status: FUNCTIONAL

## 2022-11-07 RX ADMIN — Medication 10 MILLIGRAM(S): at 21:14

## 2022-11-07 RX ADMIN — LORATADINE 10 MILLIGRAM(S): 10 TABLET ORAL at 12:17

## 2022-11-07 RX ADMIN — Medication 25 MILLIGRAM(S): at 12:19

## 2022-11-07 RX ADMIN — ISOSORBIDE MONONITRATE 30 MILLIGRAM(S): 60 TABLET, EXTENDED RELEASE ORAL at 12:17

## 2022-11-07 RX ADMIN — Medication 25 MICROGRAM(S): at 05:36

## 2022-11-07 RX ADMIN — ATORVASTATIN CALCIUM 10 MILLIGRAM(S): 80 TABLET, FILM COATED ORAL at 21:13

## 2022-11-07 RX ADMIN — Medication 25 MILLIGRAM(S): at 21:14

## 2022-11-07 RX ADMIN — Medication 25 MILLIGRAM(S): at 05:35

## 2022-11-07 NOTE — PROGRESS NOTE ADULT - SUBJECTIVE AND OBJECTIVE BOX
Electrophysiology Attending Consult Note    HPI:  Patient is a 86 yo M w/ PMH of CAD s/p CABG 2011 and PCI prior to that, Vertigo, DM, HTN, Colon polyps presenting with chief complaint of rectal bleeding. Patient states he was in his normal state of health and started experiencing rectal bleeding starting 2 weeks ago. Symptoms associated with abdominal pain. He also had symptoms of lightheadedness which he states is chronic for 4 years. He noticed his bleeding worsened yesterday, described as mixed with bloody stool. He states he had 2 colonoscopies done many years ago and was told he had "cancerous colon polyps" which were removed. He has not followed up with GI recently. Patient denies fever, chills, chest pain, palpitations, orthopnea, PND, SOB, cough, wheezing, abdominal pain, nausea, vomiting, diarrhea, constipation, melena, hematemesis, urinary complaints, syncope, calf tenderness, paresthesias.  (05 Nov 2022 12:43)    Above appreciated from H and P:  - Patient reports not seeing cardiologist for years, but this may not be very accurate as he was likely seen by Dr. Powers sometime earlier this year. He reports slow HR in the 40s for years. Also reports stable dizziness "I feel drunk" for 4-5 years which feels worst in the morning and gets better as the day goes on. Denies syncope or CORONEL. Patient reported that a specialist (probably Dr. Powers) stopped his atenolol due to slow HR and started him on Norvasc which resulted in leg edema and so his PCP stopped Norvasc and put him back on atenolol (25 mg bid) which he was taking as by his medication list till this admission.   - He is now admitted with GIB and is awaiting colonoscopy. Cardiology and EP consulted for clearance. TTE done, report pending  - EKGs and tele reviewed and showed SR/SB with 1st degree AVB and sinus arrhythmia/sinus pauses (all < 3 seconds) and rare blocked PACs. No high grade AVB or long pauses. HR trend mostly in the 40s.     Interval Hx 11/6-11/7:  - Colonoscopy showed a mass, s/p biopsy and awaiting surgical evaluation  - off atenolol. HR is in the 60s now without long pauses or high grade AVB  - TTE showed normal EF    PAST MEDICAL & SURGICAL HISTORY:  Coronary artery disease involving native heart without angina pectoris, unspecified vessel or lesion type  Hypothyroidism, unspecified type  Hyperlipidemia, unspecified hyperlipidemia type  CKD (chronic kidney disease) stage 3, GFR 30-59 ml/min  HTN (hypertension)  Diabetes  S/P CABG (coronary artery bypass graft)          REVIEW OF SYSTEMS:    CONSTITUTIONAL: No fever or fatigue  EYES: No eye pain, visual disturbances, or discharge  ENMT:  No difficulty hearing, tinnitus, vertigo; No sinus or throat pain  NECK: No pain or stiffness  RESPIRATORY: No cough, wheezing, chills or hemoptysis; No shortness of breath  CARDIOVASCULAR: No chest pain, palpitations, or leg swelling  GASTROINTESTINAL: No abdominal or epigastric pain. No nausea, vomiting, or hematemesis;  GENITOURINARY: No dysuria, frequency, hematuria, or incontinence  NEUROLOGICAL: No headaches, memory loss, loss of strength, numbness, or tremors  SKIN: No itching, burning, rashes, or lesions   LYMPH NODES: No enlarged glands  ENDOCRINE: No heat or cold intolerance; No hair loss  MUSCULOSKELETAL: No joint pain or swelling; No muscle, back, or extremity pain  HEME/LYMPH: No easy bruising, or bleeding gums  ALLERY AND IMMUNOLOGIC: No hives or eczema      MEDICATIONS  (STANDING):  atorvastatin 10 milliGRAM(s) Oral at bedtime  dextrose 5%. 1000 milliLiter(s) (50 mL/Hr) IV Continuous <Continuous>  dextrose 5%. 1000 milliLiter(s) (100 mL/Hr) IV Continuous <Continuous>  dextrose 50% Injectable 25 Gram(s) IV Push once  dextrose 50% Injectable 12.5 Gram(s) IV Push once  dextrose 50% Injectable 25 Gram(s) IV Push once  glucagon  Injectable 1 milliGRAM(s) IntraMuscular once  hydrALAZINE 25 milliGRAM(s) Oral three times a day  insulin lispro (ADMELOG) corrective regimen sliding scale   SubCutaneous three times a day before meals  isosorbide   mononitrate ER Tablet (IMDUR) 30 milliGRAM(s) Oral daily  levothyroxine 25 MICROGram(s) Oral daily  loratadine 10 milliGRAM(s) Oral daily    MEDICATIONS  (PRN):  acetaminophen     Tablet .. 650 milliGRAM(s) Oral every 6 hours PRN Temp greater or equal to 38C (100.4F), Mild Pain (1 - 3)  aluminum hydroxide/magnesium hydroxide/simethicone Suspension 30 milliLiter(s) Oral every 4 hours PRN Dyspepsia  dextrose Oral Gel 15 Gram(s) Oral once PRN Blood Glucose LESS THAN 70 milliGRAM(s)/deciliter  melatonin 3 milliGRAM(s) Oral at bedtime PRN Insomnia  ondansetron Injectable 4 milliGRAM(s) IV Push every 8 hours PRN Nausea and/or Vomiting    Allergies  Iodine (Anaphylaxis)    SOCIAL HISTORY:  negative for smoking or alcohol abuse    FAMILY HISTORY:  No pertinent family history in first degree relatives    Vital Signs Last 24 Hrs  T(C): 36.6 (07 Nov 2022 12:17), Max: 36.7 (06 Nov 2022 16:53)  T(F): 97.8 (07 Nov 2022 12:17), Max: 98 (06 Nov 2022 16:53)  HR: 69 (07 Nov 2022 12:17) (60 - 90)  BP: 147/81 (07 Nov 2022 12:17) (116/78 - 147/81)  BP(mean): 103 (07 Nov 2022 12:17) (103 - 103)  RR: 18 (07 Nov 2022 12:17) (17 - 18)  SpO2: 100% (07 Nov 2022 12:17) (96% - 100%)    Parameters below as of 07 Nov 2022 12:17  Patient On (Oxygen Delivery Method): room air          Physical Exam:  Constitutional: AAOx3, NAD  Neck: supple, No JVD  Cardiovascular: +S1S2 RRR, soft systolic murmurs. No rubs, gallops   Pulmonary: CTA b/l, unlabored, no wheezes, rales. rhonci  Abdomen:  soft NTND  Extremities: no edema b/l,   Neuro: non focal, speech clear, JONES x 4    LABS:                                   11.8   7.78  )-----------( 222      ( 07 Nov 2022 06:26 )             35.6   11-07    141  |  104  |  24.3<H>  ----------------------------<  99  4.4   |  23.0  |  2.28<H>    Ca    9.1      07 Nov 2022 06:26  Phos  3.4     11-06  Mg     1.7     11-06    TPro  5.9<L>  /  Alb  3.5  /  TBili  1.0  /  DBili  x   /  AST  29  /  ALT  8   /  AlkPhos  59  11-07        RADIOLOGY & ADDITIONAL STUDIES:    < from: TTE Echo Complete w/o Contrast w/ Doppler (11.06.22 @ 10:09) >    Summary:   1. Left ventricular ejection fraction, by visual estimation, is 65 to   70%.   2. Normal global left ventricular systolic function.   3. Normal left ventricular internal cavity size.   4. There is mild concentric left ventricular hypertrophy.   5. Normal right ventricular size and function.   6. The left atrium is normal in size.   7. Trace mitral valve regurgitation.   8. Sclerotic aortic valve with normal opening.   9. There is no evidence of pericardial effusion.    MD Noel Electronically signed on 11/6/2022 at 1:03:49 PM    < end of copied text >    < from: CT Abdomen and Pelvis No Cont (11.05.22 @ 09:57) >  IMPRESSION:    Posterior rectal wall thickening, with mild stranding in the perirectal   fat.  Recommend further clinical correlation and colonoscopy to evaluate for   rectal mass/neoplasm.    Other findings as discussed above.        VERTEBRAL BODY ANALYSIS: No Vertebral fracture or low bone density   identified.    < end of copied text >

## 2022-11-07 NOTE — PROGRESS NOTE ADULT - SUBJECTIVE AND OBJECTIVE BOX
MARIA CASTILLO  [id number]      Chief Complaint:  Aleksandr/Heart Block/GIB    Interval History:  Patient without c/o at this time.  Denies CP, SOB, palps, dizziness, syncope.    Tele:  SR with 1st degree AVB, sinus arrythmia       acetaminophen     Tablet .. 650 milliGRAM(s) Oral every 6 hours PRN  aluminum hydroxide/magnesium hydroxide/simethicone Suspension 30 milliLiter(s) Oral every 4 hours PRN  atorvastatin 10 milliGRAM(s) Oral at bedtime  dextrose 5%. 1000 milliLiter(s) IV Continuous <Continuous>  dextrose 5%. 1000 milliLiter(s) IV Continuous <Continuous>  dextrose 50% Injectable 25 Gram(s) IV Push once  dextrose 50% Injectable 12.5 Gram(s) IV Push once  dextrose 50% Injectable 25 Gram(s) IV Push once  dextrose Oral Gel 15 Gram(s) Oral once PRN  dicyclomine 10 milliGRAM(s) Oral four times a day before meals PRN  glucagon  Injectable 1 milliGRAM(s) IntraMuscular once  hydrALAZINE 25 milliGRAM(s) Oral three times a day  insulin lispro (ADMELOG) corrective regimen sliding scale   SubCutaneous three times a day before meals  isosorbide   mononitrate ER Tablet (IMDUR) 30 milliGRAM(s) Oral daily  levothyroxine 25 MICROGram(s) Oral daily  loratadine 10 milliGRAM(s) Oral daily  melatonin 3 milliGRAM(s) Oral at bedtime PRN  ondansetron Injectable 4 milliGRAM(s) IV Push every 8 hours PRN          Physical Exam:  T(C): 36.6 (11-07-22 @ 12:17), Max: 36.7 (11-06-22 @ 16:53)  HR: 69 (11-07-22 @ 12:17) (60 - 90)  BP: 147/81 (11-07-22 @ 12:17) (116/78 - 147/81)  RR: 18 (11-07-22 @ 12:17) (17 - 18)  SpO2: 100% (11-07-22 @ 12:17) (96% - 100%)  Wt(kg): --  General: Comfortable in NAD  Neck: No JVD  CVS: nl s1s2, no s3  Pulm: CTA b/l  Abd: soft, non-tender  Ext: No c/c/e  Neuro A&O x3  Psych: Normal affect      Labs:   07 Nov 2022 06:26    141    |  104    |  24.3   ----------------------------<  99     4.4     |  23.0   |  2.28     Ca    9.1        07 Nov 2022 06:26  Phos  3.4       06 Nov 2022 06:55  Mg     1.7       06 Nov 2022 06:55    TPro  5.9    /  Alb  3.5    /  TBili  1.0    /  DBili  x      /  AST  29     /  ALT  8      /  AlkPhos  59     07 Nov 2022 06:26                          11.8   7.78  )-----------( 222      ( 07 Nov 2022 06:26 )             35.6               Echo:   1. Left ventricular ejection fraction, by visual estimation, is 65 to 70%.   2. Normal global left ventricular systolic function.   3. Normal left ventricular internal cavity size.   4. There is mild concentric left ventricular hypertrophy.   5. Normal right ventricular size and function.   6. The left atrium is normal in size.   7. Trace mitral valve regurgitation.   8. Sclerotic aortic valve with normal opening.   9. There is no evidence of pericardial effusion.        Assessment:  86 yo M PMHx CAD s/p CABG, Vertigo, DM, HTN, HLD, colon polyps presenting with chief complaint of rectal bleeding. Patient states he was in his normal state of health and started experiencing rectal bleeding starting 2 weeks ago. Bleeding worsened and came to ER.  In ER noted to be aleksandr and with some heart block.  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  Patient with chronic dizziness and bradycardia unclear if related.  BB had been previously d/c'ed.  No evidence of CHF/ACS.  -Echo with preserved EF and no significant VHD.  -EP eval with no significant 2nd degree HB.  Recommend holding BB and all AVN blockers.  No indication for PPM now.  Plan for OP monitoring initially with event.  Ok for colonoscopy with Atropine available prn.  -colonoscopy with evidence of colonic mass    Plan:  1. Tele  2. EP f/u appreciated.  No indication for PPM now.  3. colonoscopy with evidence of colonic mass, Patient with no CV in case of surgery needed.   Atropine on standby during procedure.  Monitor through procedure and post.  4. No additional CV testing now.  5. No AVN blockers.  6. Hold ASA with bleeding.  7. continue with current CV meds   6. EP recs appreciated.       MARIA CASTILLO  [id number]      Chief Complaint:  Aleksandr/Heart Block/GIB    Interval History:  Patient without c/o at this time.  Denies CP, SOB, palps, dizziness, syncope.    Tele:  SR with 1st degree AVB, sinus arrythmia       acetaminophen     Tablet .. 650 milliGRAM(s) Oral every 6 hours PRN  aluminum hydroxide/magnesium hydroxide/simethicone Suspension 30 milliLiter(s) Oral every 4 hours PRN  atorvastatin 10 milliGRAM(s) Oral at bedtime  dextrose 5%. 1000 milliLiter(s) IV Continuous <Continuous>  dextrose 5%. 1000 milliLiter(s) IV Continuous <Continuous>  dextrose 50% Injectable 25 Gram(s) IV Push once  dextrose 50% Injectable 12.5 Gram(s) IV Push once  dextrose 50% Injectable 25 Gram(s) IV Push once  dextrose Oral Gel 15 Gram(s) Oral once PRN  dicyclomine 10 milliGRAM(s) Oral four times a day before meals PRN  glucagon  Injectable 1 milliGRAM(s) IntraMuscular once  hydrALAZINE 25 milliGRAM(s) Oral three times a day  insulin lispro (ADMELOG) corrective regimen sliding scale   SubCutaneous three times a day before meals  isosorbide   mononitrate ER Tablet (IMDUR) 30 milliGRAM(s) Oral daily  levothyroxine 25 MICROGram(s) Oral daily  loratadine 10 milliGRAM(s) Oral daily  melatonin 3 milliGRAM(s) Oral at bedtime PRN  ondansetron Injectable 4 milliGRAM(s) IV Push every 8 hours PRN          Physical Exam:  T(C): 36.6 (11-07-22 @ 12:17), Max: 36.7 (11-06-22 @ 16:53)  HR: 69 (11-07-22 @ 12:17) (60 - 90)  BP: 147/81 (11-07-22 @ 12:17) (116/78 - 147/81)  RR: 18 (11-07-22 @ 12:17) (17 - 18)  SpO2: 100% (11-07-22 @ 12:17) (96% - 100%)  Wt(kg): --  General: Comfortable in NAD  Neck: No JVD  CVS: nl s1s2, no s3  Pulm: CTA b/l  Abd: soft, non-tender  Ext: No c/c/e  Neuro A&O x3  Psych: Normal affect      Labs:   07 Nov 2022 06:26    141    |  104    |  24.3   ----------------------------<  99     4.4     |  23.0   |  2.28     Ca    9.1        07 Nov 2022 06:26  Phos  3.4       06 Nov 2022 06:55  Mg     1.7       06 Nov 2022 06:55    TPro  5.9    /  Alb  3.5    /  TBili  1.0    /  DBili  x      /  AST  29     /  ALT  8      /  AlkPhos  59     07 Nov 2022 06:26                          11.8   7.78  )-----------( 222      ( 07 Nov 2022 06:26 )             35.6               Echo:   1. Left ventricular ejection fraction, by visual estimation, is 65 to 70%.   2. Normal global left ventricular systolic function.   3. Normal left ventricular internal cavity size.   4. There is mild concentric left ventricular hypertrophy.   5. Normal right ventricular size and function.   6. The left atrium is normal in size.   7. Trace mitral valve regurgitation.   8. Sclerotic aortic valve with normal opening.   9. There is no evidence of pericardial effusion.        Assessment:  88 yo M PMHx CAD s/p CABG, Vertigo, DM, HTN, HLD, colon polyps presenting with chief complaint of rectal bleeding. Patient states he was in his normal state of health and started experiencing rectal bleeding starting 2 weeks ago. Bleeding worsened and came to ER.  In ER noted to be aleksandr and with some heart block.  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  Patient with chronic dizziness and bradycardia unclear if related.  BB had been previously d/c'ed.  No evidence of CHF/ACS.  -Echo with preserved EF and no significant VHD.  -EP eval with no significant 2nd degree HB.  Recommend holding BB and all AVN blockers.  No indication for PPM now.  Plan for OP monitoring initially with event.    -colonoscopy with evidence of colonic mass    Plan:  1. Tele  2. EP f/u appreciated.  No indication for PPM now.  3. colonoscopy with evidence of colonic mass, Patient with no CV contraindications in case of surgery needed.   Atropine on standby during procedure.  Monitor through procedure and post.  4. No additional CV testing now.  5. No AVN blockers.  6. Hold ASA with bleeding. Resume once feasible from anemia stand point.    7. continue with current CV meds   6. Please call us back with questions or concerns.

## 2022-11-07 NOTE — PROGRESS NOTE ADULT - ASSESSMENT
88 yo M w/ PMH of CAD s/p CABG, Vertigo, DM, HTN, HLD, Colon polyps presenting with chief complaint of rectal bleeding.    Rectal bleeding- colonoscopy with biopsies today  - CT reviewed - rectal thickening  - stable 11.8 CBC, transfuse prn  diet advance per GI- cler liquid    CAD s/p CABG, HTN, HLD  - Hold ASA  - Continue home medications  - Hold Atenolol due to bradycardia  had Norvasc in past but had leg edema with it     Bradycardia off atenolol- 46-63 today -better off atenolol  SR/SB with 1st degree AVB and sinus arrhythmia/sinus pauses (all < 3 seconds)   Atropine is at the bedside     SVETA on CKD  renal input noted- await urine   Hydralazine 25mg TID for HTN.    hold on ACEI/ ARB and diuretics now with elevated Cr    DM2  - Not on any medications, diet controlled  - Sliding scale, FS  - Carb consistent diet    DVT ppx; SCDs

## 2022-11-07 NOTE — PROGRESS NOTE ADULT - SUBJECTIVE AND OBJECTIVE BOX
MARIA CASTILLO Patient is a 87y old  Male who presents with a chief complaint of Rectal bleeding (2022 15:54)     HPI:  Patient is a 86 yo M w/ PMH of CAD s/p CABG, Vertigo, DM, HTN, Colon polyps presenting with chief complaint of rectal bleeding. Patient states he was in his normal state of health and started experiencing rectal bleeding starting 2 weeks ago. Symptoms associated with abdominal pain. He also had symptoms of lightheadedness which he states is chronic for 4 years. He noticed his bleeding worsened yesterday, described as mixed with bloody stool. He states he had 2 colonoscopies done many years ago and was told he had "cancerous colon polyps" which were removed. He has not followed up with GI recently. Patient denies fever, chills, chest pain, palpitations, orthopnea, PND, SOB, cough, wheezing, abdominal pain, nausea, vomiting, diarrhea, constipation, melena, hematemesis, urinary complaints, syncope, calf tenderness, paresthesias.  (2022 12:43)    The patient was seen and evaluated - going now for coloscopy- verbalizes understanding of the treatment plan   The patient is in no acute distress.  Denied any fever chest pain, palpitations, shortness of breath, abdominal pain, fever, dysuria, cough, edema       I&O's Summary    2022 07:01  -  2022 07:00  --------------------------------------------------------  IN: 500 mL / OUT: 600 mL / NET: -100 mL      Allergies    iodine (Anaphylaxis)    Intolerances      HEALTH ISSUES - PROBLEM Dx:  Rectal bleeding          PAST MEDICAL & SURGICAL HISTORY:  Coronary artery disease involving native heart without angina pectoris, unspecified vessel or lesion type      Hypothyroidism, unspecified type      Hyperlipidemia, unspecified hyperlipidemia type      CKD (chronic kidney disease) stage 3, GFR 30-59 ml/min      HTN (hypertension)      Diabetes      S/P CABG (coronary artery bypass graft)              Vital Signs Last 24 Hrs  T(C): 36.6 (2022 16:27), Max: 36.6 (2022 05:38)  T(F): 97.8 (2022 16:27), Max: 97.9 (2022 05:38)  HR: 58 (:27) (58 - 90)  BP: 138/72 (2022 16:27) (116/78 - 147/81)  BP(mean): 94 (:27) (94 - 103)  RR: 18 (:27) (18 - 18)  SpO2: 96% (:) (96% - 100%)    Parameters below as of   Patient On (Oxygen Delivery Method): room air    T(C): 36.6 (22 @ 16:27), Max: 36.6 (22 @ 05:38)  HR: 58 (22 @ 16:27) (58 - 90)  BP: 138/72 (22 @ 16:27) (116/78 - 147/81)  RR: 18 (22 @ 16:27) (18 - 18)  SpO2: 96% (22 @ 16:27) (96% - 100%)  Wt(kg): --    PHYSICAL EXAM:    GENERAL: NAD, eldlery   HEAD:  Atraumatic, Normocephalic  EYES: EOMI, PERRL, conjunctiva and sclera clear  ENMT:  Moist mucous membranes,  No lesions  NECK: Supple, No JVD, Normal thyroid  NERVOUS SYSTEM:  Alert & Oriented X3,  Moves upper and lower extremities; CNS-II-XII  CHEST/LUNG: Clear to auscultation bilaterally; No rales, rhonchi, wheezing,   HEART: Regular rate and rhythm; No murmurs,   ABDOMEN: Soft, Nontender, Nondistended; Bowel sounds present  EXTREMITIES:  Peripheral Pulses, No  cyanosis, or edema  SKIN: No rashes or lesions  psychiatry- mood and affect appropriate, Insight and judgement intact     acetaminophen     Tablet .. 650 milliGRAM(s) Oral every 6 hours PRN  aluminum hydroxide/magnesium hydroxide/simethicone Suspension 30 milliLiter(s) Oral every 4 hours PRN  atorvastatin 10 milliGRAM(s) Oral at bedtime  dextrose 5%. 1000 milliLiter(s) IV Continuous <Continuous>  dextrose 5%. 1000 milliLiter(s) IV Continuous <Continuous>  dextrose 50% Injectable 25 Gram(s) IV Push once  dextrose 50% Injectable 12.5 Gram(s) IV Push once  dextrose 50% Injectable 25 Gram(s) IV Push once  dextrose Oral Gel 15 Gram(s) Oral once PRN  dicyclomine 10 milliGRAM(s) Oral four times a day before meals PRN  glucagon  Injectable 1 milliGRAM(s) IntraMuscular once  hydrALAZINE 25 milliGRAM(s) Oral three times a day  insulin lispro (ADMELOG) corrective regimen sliding scale   SubCutaneous three times a day before meals  isosorbide   mononitrate ER Tablet (IMDUR) 30 milliGRAM(s) Oral daily  levothyroxine 25 MICROGram(s) Oral daily  loratadine 10 milliGRAM(s) Oral daily  melatonin 3 milliGRAM(s) Oral at bedtime PRN  ondansetron Injectable 4 milliGRAM(s) IV Push every 8 hours PRN      LABS:                          11.8   7.78  )-----------( 222      ( 2022 06:26 )             35.6     11-    141  |  104  |  24.3<H>  ----------------------------<  99  4.4   |  23.0  |  2.28<H>    Ca    9.1      2022 06:26  Phos  3.4     11-06  Mg     1.7     11-    TPro  5.9<L>  /  Alb  3.5  /  TBili  1.0  /  DBili  x   /  AST  29  /  ALT  8   /  AlkPhos  59  11-07    LIVER FUNCTIONS - ( 2022 06:26 )  Alb: 3.5 g/dL / Pro: 5.9 g/dL / ALK PHOS: 59 U/L / ALT: 8 U/L / AST: 29 U/L / GGT: x                 Urinalysis Basic - ( 2022 18:00 )    Color: Yellow / Appearance: Clear / S.010 / pH: x  Gluc: x / Ketone: Negative  / Bili: Negative / Urobili: Negative mg/dL   Blood: x / Protein: 15 / Nitrite: Negative   Leuk Esterase: Negative / RBC: 0-2 /HPF / WBC 0-2 /HPF   Sq Epi: x / Non Sq Epi: Few / Bacteria: Few      CAPILLARY BLOOD GLUCOSE      POCT Blood Glucose.: 68 mg/dL (2022 12:16)  POCT Blood Glucose.: 90 mg/dL (2022 08:43)  POCT Blood Glucose.: 173 mg/dL (2022 21:00)      RADIOLOGY & ADDITIONAL TESTS:      Consultant notes reviewed    Case discussed with consultant/provider/ family /patient

## 2022-11-07 NOTE — CONSULT NOTE ADULT - SUBJECTIVE AND OBJECTIVE BOX
HPI: The pt is and 86yo M PMH +CAD/CABG, DM, HTN, Hypothyroidism who presented to [facility] with rectal bleeding which started approx 2 weeks ago and progressively worsened.  We are called regarding SVETA.       PAST MEDICAL & SURGICAL HISTORY:  Coronary artery disease involving native heart without angina pectoris, unspecified vessel or lesion type      Hypothyroidism, unspecified type      Hyperlipidemia, unspecified hyperlipidemia type      CKD (chronic kidney disease) stage 3, GFR 30-59 ml/min      HTN (hypertension)      Diabetes      S/P CABG (coronary artery bypass graft)      FAMILY HISTORY:  No CKD noted    Social History:  No tobacco; no EtOH nor drug abuse    MEDICATIONS  (STANDING):  atorvastatin 10 milliGRAM(s) Oral at bedtime  dextrose 5%. 1000 milliLiter(s) (50 mL/Hr) IV Continuous <Continuous>  dextrose 5%. 1000 milliLiter(s) (100 mL/Hr) IV Continuous <Continuous>  dextrose 50% Injectable 25 Gram(s) IV Push once  dextrose 50% Injectable 12.5 Gram(s) IV Push once  dextrose 50% Injectable 25 Gram(s) IV Push once  glucagon  Injectable 1 milliGRAM(s) IntraMuscular once  hydrALAZINE 25 milliGRAM(s) Oral three times a day  insulin lispro (ADMELOG) corrective regimen sliding scale   SubCutaneous three times a day before meals  isosorbide   mononitrate ER Tablet (IMDUR) 30 milliGRAM(s) Oral daily  levothyroxine 25 MICROGram(s) Oral daily  loratadine 10 milliGRAM(s) Oral daily    MEDICATIONS  (PRN):  acetaminophen     Tablet .. 650 milliGRAM(s) Oral every 6 hours PRN Temp greater or equal to 38C (100.4F), Mild Pain (1 - 3)  aluminum hydroxide/magnesium hydroxide/simethicone Suspension 30 milliLiter(s) Oral every 4 hours PRN Dyspepsia  dextrose Oral Gel 15 Gram(s) Oral once PRN Blood Glucose LESS THAN 70 milliGRAM(s)/deciliter  melatonin 3 milliGRAM(s) Oral at bedtime PRN Insomnia  ondansetron Injectable 4 milliGRAM(s) IV Push every 8 hours PRN Nausea and/or Vomiting      Allergies    iodine (Anaphylaxis)    Intolerances        REVIEW OF SYSTEMS:    CONSTITUTIONAL: No fever, weight loss, + fatigue  EYES: No eye pain, visual disturbances, or discharge  ENMT:  No difficulty hearing, tinnitus, vertigo; No sinus or throat pain  NECK: No pain or stiffness  BREASTS: No pain, masses, or nipple discharge  RESPIRATORY: No cough, wheezing, chills or hemoptysis; + shortness of breath  CARDIOVASCULAR: No chest pain, palpitations, dizziness, or leg swelling  GASTROINTESTINAL: No abdominal or epigastric pain. No nausea, vomiting, or hematemesis; No diarrhea or constipation. + hematochezia.  GENITOURINARY: No dysuria, frequency, hematuria, or incontinence  NEUROLOGICAL: No headaches, memory loss, loss of strength, numbness, or tremors  SKIN: No itching, burning, rashes, or lesions   MUSCULOSKELETAL: No joint pain or swelling; No muscle, back, or extremity pain  PSYCHIATRIC: No depression, anxiety, mood swings, or difficulty sleeping      Vital Signs Last 24 Hrs  T(C): 36.6 (2022 05:38), Max: 36.7 (2022 16:53)  T(F): 97.9 (2022 05:38), Max: 98 (2022 16:53)  HR: 75 (2022 05:38) (60 - 90)  BP: 129/63 (2022 05:38) (116/78 - 144/80)  BP(mean): --  RR: 18 (2022 05:38) (17 - 18)  SpO2: 96% (2022 05:38) (96% - 99%)    Parameters below as of 2022 05:38  Patient On (Oxygen Delivery Method): room air        PHYSICAL EXAM:    GENERAL:   HEAD:  Atraumatic, Normocephalic  EYES: EOMI, PERRLA, conjunctiva and sclera clear  ENMT: No tonsillar erythema, exudates, or enlargement; Moist mucous membranes, Good dentition, No lesions  NECK: Supple, No JVD, Normal thyroid  NERVOUS SYSTEM:  Alert & Oriented X3, Good concentration; Motor Strength 5/5 B/L upper and lower extremities; DTRs 2+ intact and symmetric  CHEST/LUNG: Clear to percussion bilaterally; No rales, rhonchi, wheezing, or rubs  HEART: Regular rate and rhythm; No murmurs, rubs, or gallops  ABDOMEN: Soft, Nontender, Nondistended; Bowel sounds present  EXTREMITIES:  2+ Peripheral Pulses, No clubbing, cyanosis, or edema  LYMPH: No lymphadenopathy noted  SKIN: No rashes or lesions      LABS:                        11.8   7.78  )-----------( 222      ( 2022 06:26 )             35.6     11-    141  |  104  |  24.3<H>  ----------------------------<  99  4.4   |  23.0  |  2.28<H>    Creatinine, Serum: 1.97 mg/dL (19)    Ca    9.1      2022 06:26  Phos  3.4     -  Mg     1.7         TPro  5.9<L>  /  Alb  3.5  /  TBili  1.0  /  DBili  x   /  AST  29  /  ALT  8   /  AlkPhos  59        Urinalysis Basic - ( 2022 18:00 )    Color: Yellow / Appearance: Clear / S.010 / pH: x  Gluc: x / Ketone: Negative  / Bili: Negative / Urobili: Negative mg/dL   Blood: x / Protein: 15 / Nitrite: Negative   Leuk Esterase: Negative / RBC: 0-2 /HPF / WBC 0-2 /HPF   Sq Epi: x / Non Sq Epi: Few / Bacteria: Few          RADIOLOGY & ADDITIONAL TESTS:  < from: CT Abdomen and Pelvis No Cont (22 @ 09:57) >    ACC: [id number] EXAM:  CT ABDOMEN AND PELVIS                          PROCEDURE DATE:  2022          INTERPRETATION:  CLINICAL INFORMATION: Abdominal pain and rectal bleeding.    COMPARISON: None.    CONTRAST/COMPLICATIONS:  IV Contrast: None.  Oral Contrast: None.  Complications: None reported at time of exam completion.    PROCEDURE:  CT of the Abdomen and Pelvis was performed.  Sagittal and coronal reformats were performed.    FINDINGS:    LOWER CHEST:  Sternotomy, CABG.    Pleural/ parenchymal consolidation medial aspect left lower lobe, stable   from prior CT scan chest 11/3/2020.  5 mm nodule right lower lobe, stable.    The evaluation of the solid organ parenchyma is limited without   intravenous contrast.    LIVER: Within normallimits.  BILE DUCTS: Normal caliber.  GALLBLADDER: Cholelithiasis.  SPLEEN: Within normal limits.  PANCREAS: Within normal limits.  ADRENALS: Within normal limits.  KIDNEYS/URETERS: Within normal limits.    BLADDER: Within normal limits.  REPRODUCTIVE ORGANS: Prostate mildly enlarged.    BOWEL:   Colonic diverticulosis, without CT evidence of diverticulitis.  Circumferential posterior rectal wall thickening. There is minimal   stranding in the perirectal fat.  Possible tiny, 3 mm lymph node in the left mesorectum.   Appendix within normal limits.  PERITONEUM: No ascites.  Presacral stranding.  Faint haziness at the root of the small bowel mesentery.  Possible tiny calcified lymph node anterior mesentery.  Probable 8 mm mesenteric lymph node medial left mid abdomen at the level   of the kidney (3:65).    VESSELS: Atherosclerotic changes.  RETROPERITONEUM/LYMPH NODES:  Small subcentimeter short axis left common iliac chain lymph nodes.    ABDOMINAL WALL:    Fat-containing left inguinal lymph node.    3.5 cm subcutaneous fluid collection right inguinal region.  Small bilateral inguinal lymph nodes.    BONES:  Degenerative changes.      IMPRESSION:    Posterior rectal wall thickening, with mild stranding in the perirectal   fat.  Recommend further clinical correlation and colonoscopy to evaluate for   rectal mass/neoplasm.    Other findings as discussed above.    < end of copied text >     HPI: The pt is and 86yo M PMH +CAD/CABG, DM, HTN, Hypothyroidism who presented to [facility] with rectal bleeding which started approx 2 weeks ago and progressively worsened.  We are called regarding abnormal kidney function.  Pt is aware of CKD and has been seen in our office in the past by Dr Mittal. Pt notes difficulty with urination, hesitancy.      PAST MEDICAL & SURGICAL HISTORY:  Coronary artery disease involving native heart without angina pectoris, unspecified vessel or lesion type      Hypothyroidism, unspecified type      Hyperlipidemia, unspecified hyperlipidemia type      CKD (chronic kidney disease) stage 3, GFR 30-59 ml/min      HTN (hypertension)      Diabetes      S/P CABG (coronary artery bypass graft)      FAMILY HISTORY:  No CKD noted    Social History:  No tobacco; no EtOH nor drug abuse    MEDICATIONS  (STANDING):  atorvastatin 10 milliGRAM(s) Oral at bedtime  dextrose 5%. 1000 milliLiter(s) (50 mL/Hr) IV Continuous <Continuous>  dextrose 5%. 1000 milliLiter(s) (100 mL/Hr) IV Continuous <Continuous>  dextrose 50% Injectable 25 Gram(s) IV Push once  dextrose 50% Injectable 12.5 Gram(s) IV Push once  dextrose 50% Injectable 25 Gram(s) IV Push once  glucagon  Injectable 1 milliGRAM(s) IntraMuscular once  hydrALAZINE 25 milliGRAM(s) Oral three times a day  insulin lispro (ADMELOG) corrective regimen sliding scale   SubCutaneous three times a day before meals  isosorbide   mononitrate ER Tablet (IMDUR) 30 milliGRAM(s) Oral daily  levothyroxine 25 MICROGram(s) Oral daily  loratadine 10 milliGRAM(s) Oral daily    MEDICATIONS  (PRN):  acetaminophen     Tablet .. 650 milliGRAM(s) Oral every 6 hours PRN Temp greater or equal to 38C (100.4F), Mild Pain (1 - 3)  aluminum hydroxide/magnesium hydroxide/simethicone Suspension 30 milliLiter(s) Oral every 4 hours PRN Dyspepsia  dextrose Oral Gel 15 Gram(s) Oral once PRN Blood Glucose LESS THAN 70 milliGRAM(s)/deciliter  melatonin 3 milliGRAM(s) Oral at bedtime PRN Insomnia  ondansetron Injectable 4 milliGRAM(s) IV Push every 8 hours PRN Nausea and/or Vomiting      Allergies    iodine (Anaphylaxis)    Intolerances        REVIEW OF SYSTEMS:    CONSTITUTIONAL: No fever, weight loss, + fatigue  EYES: No eye pain, visual disturbances, or discharge  ENMT:  No difficulty hearing, tinnitus, vertigo; No sinus or throat pain  NECK: No pain or stiffness  BREASTS: No pain, masses, or nipple discharge  RESPIRATORY: No cough, wheezing, chills or hemoptysis; + shortness of breath  CARDIOVASCULAR: No chest pain, palpitations, dizziness, or leg swelling  GASTROINTESTINAL: No abdominal or epigastric pain. No nausea, vomiting, or hematemesis; No diarrhea or constipation. + hematochezia.  GENITOURINARY: + urinary hesitancy  NEUROLOGICAL: No headaches, memory loss, loss of strength, numbness, or tremors  SKIN: No itching, burning, rashes, or lesions   MUSCULOSKELETAL: No joint pain or swelling; No muscle, back, or extremity pain  PSYCHIATRIC: No depression, anxiety, mood swings, or difficulty sleeping      Vital Signs Last 24 Hrs  T(C): 36.6 (2022 05:38), Max: 36.7 (2022 16:53)  T(F): 97.9 (2022 05:38), Max: 98 (2022 16:53)  HR: 75 (2022 05:38) (60 - 90)  BP: 129/63 (2022 05:38) (116/78 - 144/80)  BP(mean): --  RR: 18 (2022 05:38) (17 - 18)  SpO2: 96% (2022 05:38) (96% - 99%)    Parameters below as of 2022 05:38  Patient On (Oxygen Delivery Method): room air        PHYSICAL EXAM:    GENERAL: Fatigued  HEAD:  Atraumatic, Normocephalic  EYES: Conjunctiva and sclera clear  ENMT: Moist mucous membranes, Good dentition, No lesions  NECK: Supple, No JVD  NERVOUS SYSTEM:  Alert & Oriented X3, intact and symmetric  CHEST/LUNG: Clear bilaterally  HEART: Regular rate and rhythm; No rub  ABDOMEN: Soft, Nontender, Nondistended; BS+  EXTREMITIES:  2+ Peripheral Pulses, No LE edema  SKIN: No rashes or lesions      LABS:                        11.8   7.78  )-----------( 222      ( 2022 06:26 )             35.6     11-07    141  |  104  |  24.3<H>  ----------------------------<  99  4.4   |  23.0  |  2.28<H>    Creatinine, Serum: 1.97 mg/dL (19)    Ca    9.1      2022 06:26  Phos  3.4     11-  Mg     1.7     -    TPro  5.9<L>  /  Alb  3.5  /  TBili  1.0  /  DBili  x   /  AST  29  /  ALT  8   /  AlkPhos  59  -      Urinalysis Basic - ( 2022 18:00 )    Color: Yellow / Appearance: Clear / S.010 / pH: x  Gluc: x / Ketone: Negative  / Bili: Negative / Urobili: Negative mg/dL   Blood: x / Protein: 15 / Nitrite: Negative   Leuk Esterase: Negative / RBC: 0-2 /HPF / WBC 0-2 /HPF   Sq Epi: x / Non Sq Epi: Few / Bacteria: Few          RADIOLOGY & ADDITIONAL TESTS:  < from: CT Abdomen and Pelvis No Cont (22 @ 09:57) >    ACC: [id number] EXAM:  CT ABDOMEN AND PELVIS                          PROCEDURE DATE:  2022          INTERPRETATION:  CLINICAL INFORMATION: Abdominal pain and rectal bleeding.    COMPARISON: None.    CONTRAST/COMPLICATIONS:  IV Contrast: None.  Oral Contrast: None.  Complications: None reported at time of exam completion.    PROCEDURE:  CT of the Abdomen and Pelvis was performed.  Sagittal and coronal reformats were performed.    FINDINGS:    LOWER CHEST:  Sternotomy, CABG.    Pleural/ parenchymal consolidation medial aspect left lower lobe, stable   from prior CT scan chest 11/3/2020.  5 mm nodule right lower lobe, stable.    The evaluation of the solid organ parenchyma is limited without   intravenous contrast.    LIVER: Within normallimits.  BILE DUCTS: Normal caliber.  GALLBLADDER: Cholelithiasis.  SPLEEN: Within normal limits.  PANCREAS: Within normal limits.  ADRENALS: Within normal limits.  KIDNEYS/URETERS: Within normal limits.    BLADDER: Within normal limits.  REPRODUCTIVE ORGANS: Prostate mildly enlarged.    BOWEL:   Colonic diverticulosis, without CT evidence of diverticulitis.  Circumferential posterior rectal wall thickening. There is minimal   stranding in the perirectal fat.  Possible tiny, 3 mm lymph node in the left mesorectum.   Appendix within normal limits.  PERITONEUM: No ascites.  Presacral stranding.  Faint haziness at the root of the small bowel mesentery.  Possible tiny calcified lymph node anterior mesentery.  Probable 8 mm mesenteric lymph node medial left mid abdomen at the level   of the kidney (3:65).    VESSELS: Atherosclerotic changes.  RETROPERITONEUM/LYMPH NODES:  Small subcentimeter short axis left common iliac chain lymph nodes.    ABDOMINAL WALL:    Fat-containing left inguinal lymph node.    3.5 cm subcutaneous fluid collection right inguinal region.  Small bilateral inguinal lymph nodes.    BONES:  Degenerative changes.      IMPRESSION:    Posterior rectal wall thickening, with mild stranding in the perirectal   fat.  Recommend further clinical correlation and colonoscopy to evaluate for   rectal mass/neoplasm.    Other findings as discussed above.    < end of copied text >

## 2022-11-07 NOTE — CONSULT NOTE ADULT - ASSESSMENT
CKD(IIIb): SVETA vs progression of dz  Rectal bleeding w/o significant anemia CKD(IIIb): SVETA vs progression of dz  Rectal bleeding w/o significant anemia  CT abdomen:  No hydronephrosis  - avoid potential nephrotoxins  - check bedside bladder scan r/o retention  - check urine studies  - trend labs

## 2022-11-07 NOTE — PROGRESS NOTE ADULT - ASSESSMENT
Patient is a 86 yo M w/ PMH of CAD s/p CABG 2011 and PCI prior to that, Vertigo, DM, HTN, Colon polyps presenting with chief complaint of rectal bleeding. Patient states he was in his normal state of health and started experiencing rectal bleeding starting 2 weeks ago. Symptoms associated with abdominal pain. He also had symptoms of lightheadedness which he states is chronic for 4 years. He noticed his bleeding worsened yesterday, described as mixed with bloody stool. He states he had 2 colonoscopies done many years ago and was told he had "cancerous colon polyps" which were removed. He has not followed up with GI recently. Patient denies fever, chills, chest pain, palpitations, orthopnea, PND, SOB, cough, wheezing, abdominal pain, nausea, vomiting, diarrhea, constipation, melena, hematemesis, urinary complaints, syncope, calf tenderness, paresthesias.  (05 Nov 2022 12:43)    Above appreciated from H and P:  - Patient reports not seeing cardiologist for years, but this may not be very accurate as he was likely seen by Dr. Powers sometime earlier this year. He reports slow HR in the 40s for years. Also reports stable dizziness "I feel drunk" for 4-5 years which feels worst in the morning and gets better as the day goes on. Denies syncope or CORONEL. Patient reported that a specialist (probably Dr. Powers) stopped his atenolol due to slow HR and started him on Norvasc which resulted in leg edema and so his PCP stopped Norvasc and put him back on atenolol (25 mg bid) which he was taking as by his medication list till this admission.   - He is now admitted with GIB and is awaiting colonoscopy. Cardiology and EP consulted for clearance. TTE done, report pending  - EKGs and tele reviewed and showed SR/SB with 1st degree AVB and sinus arrhythmia/sinus pauses (all < 3 seconds) and rare blocked PACs. No high grade AVB or long pauses. HR trend mostly in the 40s.     Interval Hx 11/6-11/7:  - Colonoscopy showed a mass, s/p biopsy and awaiting surgical evaluation  - off atenolol. HR is in the 60s now without long pauses or high grade AVB  - TTE showed normal EF    Patient with SB, 1st degree AVB, sinus arrhythmia and sinus pauses without high grade AVB, or prior syncope. Unclear if his dizziness is due to his bradycardia.   - No rhythm related contraindication for any other procedures. Suggest to have atropin ready and use atropine 0.5 mg prn IV if bradycardia during sedation, max 2 mg in 3 hours and 3 mg in 24 hours.   - Avoid all BB, EPS sign off. Follow up with me as o/p in 6-8 weeks. Will likely do [facility] as o/p.

## 2022-11-08 LAB
ALBUMIN SERPL ELPH-MCNC: 2.9 G/DL — LOW (ref 3.3–5.2)
ALP SERPL-CCNC: 52 U/L — SIGNIFICANT CHANGE UP (ref 40–120)
ALT FLD-CCNC: 10 U/L — SIGNIFICANT CHANGE UP
ANION GAP SERPL CALC-SCNC: 12 MMOL/L — SIGNIFICANT CHANGE UP (ref 5–17)
AST SERPL-CCNC: 33 U/L — SIGNIFICANT CHANGE UP
BASOPHILS # BLD AUTO: 0.05 K/UL — SIGNIFICANT CHANGE UP (ref 0–0.2)
BASOPHILS NFR BLD AUTO: 0.7 % — SIGNIFICANT CHANGE UP (ref 0–2)
BILIRUB SERPL-MCNC: 0.9 MG/DL — SIGNIFICANT CHANGE UP (ref 0.4–2)
BUN SERPL-MCNC: 22.9 MG/DL — HIGH (ref 8–20)
CALCIUM SERPL-MCNC: 8.4 MG/DL — SIGNIFICANT CHANGE UP (ref 8.4–10.5)
CHLORIDE SERPL-SCNC: 106 MMOL/L — SIGNIFICANT CHANGE UP (ref 96–108)
CO2 SERPL-SCNC: 21 MMOL/L — LOW (ref 22–29)
CREAT SERPL-MCNC: 1.97 MG/DL — HIGH (ref 0.5–1.3)
EGFR: 33 ML/MIN/1.73M2 — LOW
EOSINOPHIL # BLD AUTO: 0.19 K/UL — SIGNIFICANT CHANGE UP (ref 0–0.5)
EOSINOPHIL NFR BLD AUTO: 2.6 % — SIGNIFICANT CHANGE UP (ref 0–6)
GLUCOSE BLDC GLUCOMTR-MCNC: 80 MG/DL — SIGNIFICANT CHANGE UP (ref 70–99)
GLUCOSE SERPL-MCNC: 76 MG/DL — SIGNIFICANT CHANGE UP (ref 70–99)
HCT VFR BLD CALC: 35.1 % — LOW (ref 39–50)
HGB BLD-MCNC: 11.4 G/DL — LOW (ref 13–17)
IMM GRANULOCYTES NFR BLD AUTO: 0.4 % — SIGNIFICANT CHANGE UP (ref 0–0.9)
LYMPHOCYTES # BLD AUTO: 1.26 K/UL — SIGNIFICANT CHANGE UP (ref 1–3.3)
LYMPHOCYTES # BLD AUTO: 17 % — SIGNIFICANT CHANGE UP (ref 13–44)
MCHC RBC-ENTMCNC: 29.5 PG — SIGNIFICANT CHANGE UP (ref 27–34)
MCHC RBC-ENTMCNC: 32.5 GM/DL — SIGNIFICANT CHANGE UP (ref 32–36)
MCV RBC AUTO: 90.9 FL — SIGNIFICANT CHANGE UP (ref 80–100)
MONOCYTES # BLD AUTO: 0.92 K/UL — HIGH (ref 0–0.9)
MONOCYTES NFR BLD AUTO: 12.4 % — SIGNIFICANT CHANGE UP (ref 2–14)
NEUTROPHILS # BLD AUTO: 4.96 K/UL — SIGNIFICANT CHANGE UP (ref 1.8–7.4)
NEUTROPHILS NFR BLD AUTO: 66.9 % — SIGNIFICANT CHANGE UP (ref 43–77)
PLATELET # BLD AUTO: 196 K/UL — SIGNIFICANT CHANGE UP (ref 150–400)
POTASSIUM SERPL-MCNC: 3.7 MMOL/L — SIGNIFICANT CHANGE UP (ref 3.5–5.3)
POTASSIUM SERPL-SCNC: 3.7 MMOL/L — SIGNIFICANT CHANGE UP (ref 3.5–5.3)
PROT SERPL-MCNC: 5.4 G/DL — LOW (ref 6.6–8.7)
RBC # BLD: 3.86 M/UL — LOW (ref 4.2–5.8)
RBC # FLD: 13.9 % — SIGNIFICANT CHANGE UP (ref 10.3–14.5)
SODIUM SERPL-SCNC: 139 MMOL/L — SIGNIFICANT CHANGE UP (ref 135–145)
WBC # BLD: 7.41 K/UL — SIGNIFICANT CHANGE UP (ref 3.8–10.5)
WBC # FLD AUTO: 7.41 K/UL — SIGNIFICANT CHANGE UP (ref 3.8–10.5)

## 2022-11-08 PROCEDURE — 99233 SBSQ HOSP IP/OBS HIGH 50: CPT

## 2022-11-08 RX ORDER — TAMSULOSIN HYDROCHLORIDE 0.4 MG/1
0.4 CAPSULE ORAL AT BEDTIME
Refills: 0 | Status: DISCONTINUED | OUTPATIENT
Start: 2022-11-08 | End: 2022-11-11

## 2022-11-08 RX ADMIN — Medication 25 MILLIGRAM(S): at 05:39

## 2022-11-08 RX ADMIN — Medication 25 MILLIGRAM(S): at 14:01

## 2022-11-08 RX ADMIN — Medication 650 MILLIGRAM(S): at 02:30

## 2022-11-08 RX ADMIN — Medication 650 MILLIGRAM(S): at 03:30

## 2022-11-08 RX ADMIN — Medication 25 MILLIGRAM(S): at 22:04

## 2022-11-08 RX ADMIN — Medication 25 MICROGRAM(S): at 05:39

## 2022-11-08 RX ADMIN — TAMSULOSIN HYDROCHLORIDE 0.4 MILLIGRAM(S): 0.4 CAPSULE ORAL at 22:03

## 2022-11-08 RX ADMIN — ATORVASTATIN CALCIUM 10 MILLIGRAM(S): 80 TABLET, FILM COATED ORAL at 22:04

## 2022-11-08 NOTE — PROGRESS NOTE ADULT - CONVERSATION DETAILS
discussed with patient's wife- who has mobility issues and walks with walker--will call back regards dnr DNI  states the patient would not want to be resuscitated

## 2022-11-08 NOTE — PROGRESS NOTE ADULT - SUBJECTIVE AND OBJECTIVE BOX
MARIA CASTILLO Patient is a 87y old  Male who presents with a chief complaint of Rectal bleeding (08 Nov 2022 11:45)     HPI:  Patient is a 88 yo M w/ PMH of CAD s/p CABG, Vertigo, DM, HTN, Colon polyps presenting with chief complaint of rectal bleeding. Patient states he was in his normal state of health and started experiencing rectal bleeding starting 2 weeks ago. Symptoms associated with abdominal pain. He also had symptoms of lightheadedness which he states is chronic for 4 years. He noticed his bleeding worsened yesterday, described as mixed with bloody stool. He states he had 2 colonoscopies done many years ago and was told he had "cancerous colon polyps" which were removed. He has not followed up with GI recently. Patient denies fever, chills, chest pain, palpitations, orthopnea, PND, SOB, cough, wheezing, abdominal pain, nausea, vomiting, diarrhea, constipation, melena, hematemesis, urinary complaints, syncope, calf tenderness, paresthesias.  (05 Nov 2022 12:43)    The patient was seen and evaluated pleasant stressed about his health- states his wife is not doing too well either and wakes up around 9-states its been one thing after another and the bleeding really scared him.  The patient is in no acute distress.        I&O's Summary    07 Nov 2022 07:01  -  08 Nov 2022 07:00  --------------------------------------------------------  IN: 120 mL / OUT: 1700 mL / NET: -1580 mL      Allergies    iodine (Anaphylaxis)    Intolerances      HEALTH ISSUES - PROBLEM Dx:  Rectal bleeding          PAST MEDICAL & SURGICAL HISTORY:  Coronary artery disease involving native heart without angina pectoris, unspecified vessel or lesion type      Hypothyroidism, unspecified type      Hyperlipidemia, unspecified hyperlipidemia type      CKD (chronic kidney disease) stage 3, GFR 30-59 ml/min      HTN (hypertension)      Diabetes      S/P CABG (coronary artery bypass graft)              Vital Signs Last 24 Hrs  T(C): 36.4 (08 Nov 2022 10:48), Max: 36.8 (08 Nov 2022 02:22)  T(F): 97.6 (08 Nov 2022 10:48), Max: 98.2 (08 Nov 2022 02:22)  HR: 56 (08 Nov 2022 10:48) (48 - 63)  BP: 142/69 (08 Nov 2022 10:48) (138/72 - 150/72)  BP(mean): 94 (07 Nov 2022 16:27) (94 - 94)  RR: 18 (08 Nov 2022 10:48) (17 - 18)  SpO2: 94% (08 Nov 2022 10:48) (94% - 99%)    Parameters below as of 08 Nov 2022 10:48  Patient On (Oxygen Delivery Method): room air    T(C): 36.4 (11-08-22 @ 10:48), Max: 36.8 (11-08-22 @ 02:22)  HR: 56 (11-08-22 @ 10:48) (48 - 63)  BP: 142/69 (11-08-22 @ 10:48) (138/72 - 150/72)  RR: 18 (11-08-22 @ 10:48) (17 - 18)  SpO2: 94% (11-08-22 @ 10:48) (94% - 99%)  Wt(kg): --    PHYSICAL EXAM:    GENERAL: NAD, elderly pleasant conversing, little anxious and sad at the same time- level of helplessness   HEAD:  Atraumatic, Normocephalic  EYES: EOMI, PERRL, conjunctiva and sclera clear  ENMT:  Moist mucous membranes,  No lesions  NECK: Supple, No JVD, Normal thyroid  NERVOUS SYSTEM:  Alert & Oriented X3,  Moves upper and lower extremities; CNS-II-XII  CHEST/LUNG: Clear to auscultation bilaterally; No rales, rhonchi, wheezing,   HEART: Regular rate and rhythm; No murmurs,   ABDOMEN: Soft, Nontender, Nondistended; Bowel sounds present  EXTREMITIES:  Peripheral Pulses, No  cyanosis, or edema  SKIN: No rashes or lesions  psychiatry- mood and affect appropriate, Insight and judgement intact     acetaminophen     Tablet .. 650 milliGRAM(s) Oral every 6 hours PRN  aluminum hydroxide/magnesium hydroxide/simethicone Suspension 30 milliLiter(s) Oral every 4 hours PRN  atorvastatin 10 milliGRAM(s) Oral at bedtime  dextrose 5%. 1000 milliLiter(s) IV Continuous <Continuous>  dextrose 5%. 1000 milliLiter(s) IV Continuous <Continuous>  dextrose 50% Injectable 25 Gram(s) IV Push once  dextrose 50% Injectable 12.5 Gram(s) IV Push once  dextrose 50% Injectable 25 Gram(s) IV Push once  dextrose Oral Gel 15 Gram(s) Oral once PRN  dicyclomine 10 milliGRAM(s) Oral four times a day before meals PRN  glucagon  Injectable 1 milliGRAM(s) IntraMuscular once  hydrALAZINE 25 milliGRAM(s) Oral three times a day  insulin lispro (ADMELOG) corrective regimen sliding scale   SubCutaneous three times a day before meals  isosorbide   mononitrate ER Tablet (IMDUR) 30 milliGRAM(s) Oral daily  levothyroxine 25 MICROGram(s) Oral daily  loratadine 10 milliGRAM(s) Oral daily  melatonin 3 milliGRAM(s) Oral at bedtime PRN  ondansetron Injectable 4 milliGRAM(s) IV Push every 8 hours PRN  tamsulosin 0.4 milliGRAM(s) Oral at bedtime      LABS:                          11.4   7.41  )-----------( 196      ( 08 Nov 2022 05:25 )             35.1     11-08    139  |  106  |  22.9<H>  ----------------------------<  76  3.7   |  21.0<L>  |  1.97<H>    Ca    8.4      08 Nov 2022 05:25    TPro  5.4<L>  /  Alb  2.9<L>  /  TBili  0.9  /  DBili  x   /  AST  33  /  ALT  10  /  AlkPhos  52  11-08    LIVER FUNCTIONS - ( 08 Nov 2022 05:25 )  Alb: 2.9 g/dL / Pro: 5.4 g/dL / ALK PHOS: 52 U/L / ALT: 10 U/L / AST: 33 U/L / GGT: x                   CAPILLARY BLOOD GLUCOSE      POCT Blood Glucose.: 80 mg/dL (08 Nov 2022 07:57)  POCT Blood Glucose.: 97 mg/dL (07 Nov 2022 21:02)  POCT Blood Glucose.: 98 mg/dL (07 Nov 2022 17:35)      RADIOLOGY & ADDITIONAL TESTS:      Consultant notes reviewed    Case discussed with consultant/provider/ family /patient

## 2022-11-08 NOTE — PROGRESS NOTE ADULT - ASSESSMENT
86 yo M w/ PMH of CAD s/p CABG, Vertigo, DM, HTN, HLD, Colon polyps presenting with chief complaint of rectal bleeding.    Rectal bleeding-acute blood loss per rectum-likely due to rectal mass.  S/p Colonoscopy yesterday revealing large rectal mass involving the dentate line occupying 75% circumference, friable, biopsied. Diverticulosis, diminutive polyps.   Tolerating diet.  Colorectal surgery evaluation -Await path results, oncology follow up.  Advance diet as tolerated  Continue to trend CBC, transfuse as needed   Hgb- stable 11.4 CBC, transfuse prn    CAD s/p CABG, HTN, HLD  - Hold ASA with bleeding  - Continue home medications  - Hold Atenolol due to bradycardia  had Norvasc in past but had leg edema with it     Bradycardia even off atenolol-last night went o 36  SR/SB with 1st degree AVB and sinus arrhythmia/sinus pauses (all < 3 seconds)   Atropine is at the bedside     SVETA on CKD-1.97- improved with Joiner component of bladder outlet obstruction present   Hydralazine 25mg TID for HTN.    hold on ACEI/ ARB and diuretics now with elevated Cr    DM2  - Not on any medications, diet controlled  - Sliding scale, FS  - Carb consistent diet    DVT ppx; SCD  86 yo M w/ PMH of CAD s/p CABG, Vertigo, DM, HTN, HLD, Colon polyps presenting with chief complaint of rectal bleeding.    Rectal bleeding-acute blood loss per rectum-likely due to rectal mass.  S/p Colonoscopy yesterday revealing large rectal mass involving the dentate line occupying 75% circumference, friable, biopsied. Diverticulosis, diminutive polyps.   Tolerating diet.  Colorectal surgery evaluation -Await path results, oncology follow up.  Advance diet as tolerated  Continue to trend CBC, transfuse as needed   Hgb- stable 11.4 CBC, transfuse prn    CAD s/p CABG, HTN, HLD  - Hold ASA with bleeding  - Continue home medications  - Hold Atenolol due to bradycardia  had Norvasc in past but had leg edema with it     Bradycardia even off atenolol-[facility] [address] [place] [address] [facility]  Call EP again if sustained HR <35 bpm while awake, long (>3.5 seconds) pauses while awake or > 5.5 seconds pauses while asleep, high grade AVB or symptomatic bradycardia.   Patient will follow up as o/p.with EP  SR/SB with 1st degree AVB and sinus arrhythmia/sinus pauses (all < 3 seconds)  Atropine is at the bedside     SVTEA on CKD-1.97- improved with Joiner component of bladder outlet obstruction present   Hydralazine 25mg TID for HTN.    hold on ACEI/ ARB and diuretics now with elevated Cr    DM2  - Not on any medications, diet controlled  - Sliding scale, FS  - Carb consistent diet    DVT ppx; SCD

## 2022-11-08 NOTE — PROGRESS NOTE ADULT - SUBJECTIVE AND OBJECTIVE BOX
Chief Complaint:  Patient is a 87y old  Male who presents with a chief complaint of Rectal bleeding (07 Nov 2022 17:02)      HPI/ 24 hr events: Patient seen and examined at bedside, no overnight events. Pt feeling well this morning. S/p Colonoscopy yesterday revealing large rectal mass involving the dentate line occupying 75% circumference, friable, biopsied. Diverticulosis, diminutive polyps. Tolerating diet. Vitals are stable, no leukocytosis, Hemoglobin stable at 11.4gm. Denies nausea, vomiting, abdominal pain, chest pain, shortness of breath, hematemesis, hematochezia, melena.       REVIEW OF SYSTEMS:   General: Negative  HEENT: Negative  CV: Negative  Respiratory: Negative  GI: See HPI  : Negative  MSK: Negative  Hematologic: Negative  Skin: Negative    MEDICATIONS:   MEDICATIONS  (STANDING):  atorvastatin 10 milliGRAM(s) Oral at bedtime  dextrose 5%. 1000 milliLiter(s) (50 mL/Hr) IV Continuous <Continuous>  dextrose 5%. 1000 milliLiter(s) (100 mL/Hr) IV Continuous <Continuous>  dextrose 50% Injectable 25 Gram(s) IV Push once  dextrose 50% Injectable 12.5 Gram(s) IV Push once  dextrose 50% Injectable 25 Gram(s) IV Push once  glucagon  Injectable 1 milliGRAM(s) IntraMuscular once  hydrALAZINE 25 milliGRAM(s) Oral three times a day  insulin lispro (ADMELOG) corrective regimen sliding scale   SubCutaneous three times a day before meals  isosorbide   mononitrate ER Tablet (IMDUR) 30 milliGRAM(s) Oral daily  levothyroxine 25 MICROGram(s) Oral daily  loratadine 10 milliGRAM(s) Oral daily  tamsulosin 0.4 milliGRAM(s) Oral at bedtime    MEDICATIONS  (PRN):  acetaminophen     Tablet .. 650 milliGRAM(s) Oral every 6 hours PRN Temp greater or equal to 38C (100.4F), Mild Pain (1 - 3)  aluminum hydroxide/magnesium hydroxide/simethicone Suspension 30 milliLiter(s) Oral every 4 hours PRN Dyspepsia  dextrose Oral Gel 15 Gram(s) Oral once PRN Blood Glucose LESS THAN 70 milliGRAM(s)/deciliter  dicyclomine 10 milliGRAM(s) Oral four times a day before meals PRN pain  melatonin 3 milliGRAM(s) Oral at bedtime PRN Insomnia  ondansetron Injectable 4 milliGRAM(s) IV Push every 8 hours PRN Nausea and/or Vomiting      ALLERGIES:   Allergies    iodine (Anaphylaxis)    Intolerances        VITAL SIGNS:   Vital Signs Last 24 Hrs  T(C): 36.4 (08 Nov 2022 10:48), Max: 36.8 (08 Nov 2022 02:22)  T(F): 97.6 (08 Nov 2022 10:48), Max: 98.2 (08 Nov 2022 02:22)  HR: 56 (08 Nov 2022 10:48) (48 - 69)  BP: 142/69 (08 Nov 2022 10:48) (138/72 - 150/72)  BP(mean): 94 (07 Nov 2022 16:27) (94 - 103)  RR: 18 (08 Nov 2022 10:48) (17 - 18)  SpO2: 94% (08 Nov 2022 10:48) (94% - 100%)    Parameters below as of 08 Nov 2022 10:48  Patient On (Oxygen Delivery Method): room air      I&O's Summary    07 Nov 2022 07:01  -  08 Nov 2022 07:00  --------------------------------------------------------  IN: 120 mL / OUT: 1700 mL / NET: -1580 mL        PHYSICAL EXAM:   GENERAL:  No acute distress  HEENT:  NC/AT, conjunctiva clear, sclera anicteric  CHEST:  No increased effort, breath sounds clear  HEART:  Regular rhythm, S1, S2,   ABDOMEN:  Soft, non-tender, non-distended, normoactive bowel sounds, no rebound or guarding  EXTREMITIES: No edema  SKIN:  Warm, dry  NEURO:  Calm, cooperative      LABS:  CBC Full  -  ( 08 Nov 2022 05:25 )  WBC Count : 7.41 K/uL  RBC Count : 3.86 M/uL  Hemoglobin : 11.4 g/dL  Hematocrit : 35.1 %  Platelet Count - Automated : 196 K/uL  Mean Cell Volume : 90.9 fl  Mean Cell Hemoglobin : 29.5 pg  Mean Cell Hemoglobin Concentration : 32.5 gm/dL  Auto Neutrophil # : 4.96 K/uL  Auto Lymphocyte # : 1.26 K/uL  Auto Monocyte # : 0.92 K/uL  Auto Eosinophil # : 0.19 K/uL  Auto Basophil # : 0.05 K/uL  Auto Neutrophil % : 66.9 %  Auto Lymphocyte % : 17.0 %  Auto Monocyte % : 12.4 %  Auto Eosinophil % : 2.6 %  Auto Basophil % : 0.7 %    11-08    139  |  106  |  22.9<H>  ----------------------------<  76  3.7   |  21.0<L>  |  1.97<H>    Ca    8.4      08 Nov 2022 05:25    TPro  5.4<L>  /  Alb  2.9<L>  /  TBili  0.9  /  DBili  x   /  AST  33  /  ALT  10  /  AlkPhos  52  11-08    LIVER FUNCTIONS - ( 08 Nov 2022 05:25 )  Alb: 2.9 g/dL / Pro: 5.4 g/dL / ALK PHOS: 52 U/L / ALT: 10 U/L / AST: 33 U/L / GGT: x                             RADIOLOGY & ADDITIONAL STUDIES:        Colonoscopy Report      Date: 11/7/2022 10:03 AM      Patient Name: MRAIA CASTILLO        Procedure:      The procedure, indications, preparation and potential complications were    explained to the patient, who indicated understanding and signed the    corresponding consent forms. MAC was administered by the anesthesiologist.    Continuous pulse oximetry and blood pressure monitoring were used throughout the    procedure. Supplemental oxygen was used. Patient was placed in left lateral    decubitus position. Digital exam was normal. The colonoscope was introduced    through the rectum and advanced under direct visualization until cecum was    reached. The appendiceal orifice and the ileocecal valve were identified. The    terminal ileum was identified. Careful visualization was performed as the    instrument was withdrawn. Patient tolerance to procedure was excellent. The    procedure was not difficult.      Findings:        Excavated lesions Multiple non-bleeding diverticula with extensive openings were    seen in the whole colon. Diverticulosis appeared to be severe.        Protruding lesions A single sessile 3 mm non-bleeding polyp of benign appearance    was found in the cecum.A single-piece polypectomy was performed using a cold    snare. The polyp was completely removed.        A single sessile 2 mm non-bleeding polyp of benign appearance was found in the    ascending colon.A single-piece polypectomy was performed using a cold snare. The    polyp was completely removed.        An ulcerated and fungating 75% circumferential bleeding 3 cm past the dentate    line mass of malignant appearance was found in the distal rectum. The scope    traversed the lesion. These findings were suggestive of a malignancy/tumor.    Multiple cold forceps biopsies were performed for histology.        Impressions:    Polyp (3 mm) in the cecum. (Polypectomy).    Polyp (2 mm) in the ascending colon. (Polypectomy).    Severe diverticulosis of the whole colon.    Mass (3 cm) in the distal rectum. (Biopsy).      Plan:    Await pathology results.    Advance diet as tolerated    Please consult colorectal surgery for management of rectal mass    Colonoscopy in 1 year  .          CT < from: CT Abdomen and Pelvis No Cont (11.05.22 @ 09:57) >  BOWEL:   Colonic diverticulosis, without CT evidence of diverticulitis.  Circumferential posterior rectal wall thickening. There is minimal   stranding in the perirectal fat.  Possible tiny, 3 mm lymph node in the left mesorectum.   Appendix within normal limits.  PERITONEUM: No ascites.  Presacral stranding.  Faint haziness at the root of the small bowel mesentery.  Possible tiny calcified lymph node anterior mesentery.  Probable 8 mm mesenteric lymph node medial left mid abdomen at the level   of the kidney (3:65).    < end of copied text >

## 2022-11-08 NOTE — CONSULT NOTE ADULT - ASSESSMENT
ASSESSMENT: Patient is a 87y old male who had a colonoscopy yesterday and was found to have an ulcerated, fungating mass 3cm from dentate line 75% circumference. Patient tolerating diet, passing flatus, and having bowel movements. TTE on 10/6 demonstrates 65-70% EF.    PLAN:    - Colorectal surgery will follow pathology results   - Please obtain CEA and CT chest with IV contrast  - ADAT  - f/u GOC conversation  - rest of care per primary team  - Rest of plan pending discussion with attending physician   - Plan discussed with Attending,   ASSESSMENT: Patient is a 87y old male who had a colonoscopy yesterday and was found to have an ulcerated, fungating mass 3cm from dentate line 75% circumference. Patient tolerating diet, passing flatus, and having bowel movements. TTE on 10/6 demonstrates 65-70% EF.    PLAN:    - Colorectal surgery will follow pathology results   - Please obtain CEA and CT chest with IV contrast  - ADAT  - Recommend chemical DVT ppx  - f/u GOC conversation  - rest of care per primary team  - Rest of plan pending discussion with attending physician   - Plan discussed with Attending,   ASSESSMENT: Patient is a 87y old male who had a colonoscopy yesterday and was found to have an ulcerated, fungating mass 3cm from dentate line 75% circumference. Patient tolerating diet, passing flatus, and having bowel movements. TTE on 10/6 demonstrates 65-70% EF.    PLAN:    - Colorectal surgery will follow pathology results   - Please obtain CEA and CT chest with IV contrast  - Will obtain outpatient Pelvic MRI  - ADAT  - Recommend chemical DVT ppx  - f/u GOC conversation  - rest of care per primary team  - Plan discussed with Attending, Dr. Cody

## 2022-11-08 NOTE — PROGRESS NOTE ADULT - PROBLEM SELECTOR PLAN 1
Rectal bleeding likely due to rectal mass. S/p Colonoscopy yesterday revealing large rectal mass involving the dentate line occupying 75% circumference, friable, biopsied. Diverticulosis, diminutive polyps. Tolerating diet. Vitals are stable, no leukocytosis, Hemoglobin stable at 11.4gm.  Recommend colorectal surgery evaluation   Advance diet as tolerated  Continue to trend CBC, transfuse as needed   Await path results, oncology follow up.  Rest of care as per primary team

## 2022-11-08 NOTE — CONSULT NOTE ADULT - SUBJECTIVE AND OBJECTIVE BOX
COLORECTAL SURGERY CONSULT     HPI: 87y Male with PMH of CAD s/p CABG, HLD, HTN, Hypothyroidism, CKD, and DM who was admitted for 2 week history of rectal bleeding that was worsening. Patient had never experienced this in the past. Bleeding was associated with abdominal pain. Patient denies any weight loss. Has been tolerating regular diet and having daily bowel movements with no change in bowel habits. Denies any family history of colon cancer, brother passed away from thyroid cancer. Denies fever, chills, nausea, vomiting, chest pain, and sob.     ROS: 10-system review is otherwise negative except HPI above.      PAST MEDICAL & SURGICAL HISTORY:  Coronary artery disease involving native heart without angina pectoris, unspecified vessel or lesion type  Hypothyroidism, unspecified type  Hyperlipidemia, unspecified hyperlipidemia type  CKD (chronic kidney disease) stage 3, GFR 30-59 ml/min  HTN (hypertension)  Diabetes  S/P CABG (coronary artery bypass graft)    FAMILY HISTORY:  No pertinent family history in first degree relatives    SOCIAL HISTORY:  Denies any toxic habits    ALLERGIES: NKA iodine (Anaphylaxis)    HOME MEDICATIONS:   atenolol 25 mg oral tablet: 1 tab(s) orally 2 times a day (05 Nov 2022 12:29)  febuxostat 40 mg oral tablet: 1 tab(s) orally once a day (05 Nov 2022 12:29)  isosorbide mononitrate 30 mg oral tablet, extended release: 1 tab(s) orally once a day (in the morning) (05 Nov 2022 12:29)  levocetirizine 5 mg oral tablet: 1 tab(s) orally once a day (in the evening) (05 Nov 2022 12:29)  levothyroxine 25 mcg (0.025 mg) oral tablet: 1 tab(s) orally once a day (05 Nov 2022 12:29)  pravastatin 40 mg oral tablet: 1 tab(s) orally once a day (05 Nov 2022 12:29)  --------------------------------------------------------------------------------------------  PHYSICAL EXAM:   General: NAD, Lying in bed comfortably  Neuro: A+Ox3  HEENT: EOMI, PERRLA, MMM  Cardio: RRR  Resp: Non labored breathing on RA  GI/Abd: Soft, NT/ND, no rebound/guarding, no masses palpated  : Joiner catheter in place  Vascular: All 4 extremities warm and well perfused.   Pelvis: stable  Musculoskeletal: All 4 extremities moving spontaneously, no limitations, no spinal tenderness.  JORGE: Rectal mass palpated approximately 3cm from anal verge, no blood.   --------------------------------------------------------------------------------------------    LABS                 11.4   7.41   )----------(  196       ( 08 Nov 2022 05:25 )               35.1      139    |  106    |  22.9   ----------------------------<  76         ( 08 Nov 2022 05:25 )  3.7     |  21.0   |  1.97     Ca    8.4        ( 08 Nov 2022 05:25 )    TPro  5.4    /  Alb  2.9    /  TBili  0.9    /  DBili  x      /  AST  33     /  ALT  10     /  AlkPhos  52     ( 08 Nov 2022 05:25 )    LIVER FUNCTIONS - ( 08 Nov 2022 05:25 )  Alb: 2.9 g/dL / Pro: 5.4 g/dL / ALK PHOS: 52 U/L / ALT: 10 U/L / AST: 33 U/L / GGT: x         --------------------------------------------------------------------------------------------    IMAGING  < from: CT Abdomen and Pelvis No Cont (11.05.22 @ 09:57) >  ACC: [id number] EXAM:  CT ABDOMEN AND PELVIS                          PROCEDURE DATE:  11/05/2022          INTERPRETATION:  CLINICAL INFORMATION: Abdominal pain and rectal bleeding.    COMPARISON: None.    CONTRAST/COMPLICATIONS:  IV Contrast: None.  Oral Contrast: None.  Complications: None reported at time of exam completion.    PROCEDURE:  CT of the Abdomen and Pelvis was performed.  Sagittal and coronal reformats were performed.    FINDINGS:    LOWER CHEST:  Sternotomy, CABG.    Pleural/ parenchymal consolidation medial aspect left lower lobe, stable   from prior CT scan chest 11/3/2020.  5 mm nodule right lower lobe, stable.    The evaluation of the solid organ parenchyma is limited without   intravenous contrast.    LIVER: Within normallimits.  BILE DUCTS: Normal caliber.  GALLBLADDER: Cholelithiasis.  SPLEEN: Within normal limits.  PANCREAS: Within normal limits.  ADRENALS: Within normal limits.  KIDNEYS/URETERS: Within normal limits.    BLADDER: Within normal limits.  REPRODUCTIVE ORGANS: Prostate mildly enlarged.    BOWEL:   Colonic diverticulosis, without CT evidence of diverticulitis.  Circumferential posterior rectal wall thickening. There is minimal   stranding in the perirectal fat.  Possible tiny, 3 mm lymph node in the left mesorectum.   Appendix within normal limits.  PERITONEUM: No ascites.  Presacral stranding.  Faint haziness at the root of the small bowel mesentery.  Possible tiny calcified lymph node anterior mesentery.  Probable 8 mm mesenteric lymph node medial left mid abdomen at the level   of the kidney (3:65).    VESSELS: Atherosclerotic changes.  RETROPERITONEUM/LYMPH NODES:  Small subcentimeter short axis left common iliac chain lymph nodes.    ABDOMINAL WALL:    Fat-containing left inguinal lymph node.    3.5 cm subcutaneous fluid collection right inguinal region.  Small bilateral inguinal lymph nodes.    BONES:  Degenerative changes.      IMPRESSION:    Posterior rectal wall thickening, with mild stranding in the perirectal   fat.  Recommend further clinical correlation and colonoscopy to evaluate for   rectal mass/neoplasm.    Other findings as discussed above.        VERTEBRAL BODY ANALYSIS: No Vertebral fracture or low bone density   identified.

## 2022-11-08 NOTE — CONSULT NOTE ADULT - ATTENDING COMMENTS
History, labs, exam and imaging reviewed. 87 year old male with a rectal mass concerning for malignancy. Pathology pending needs staging work up.

## 2022-11-08 NOTE — PROGRESS NOTE ADULT - ASSESSMENT
CKD(IIIb): SVETA vs progression of dz  SVETA +LEACH  Rectal bleeding w/o significant anemia  CT abdomen:  No hydronephrosis  - avoid potential nephrotoxins  - cont Joiner  - trend labs

## 2022-11-08 NOTE — PROGRESS NOTE ADULT - SUBJECTIVE AND OBJECTIVE BOX
NEPHROLOGY INTERVAL HPI/OVERNIGHT EVENTS:  pt comfortable when seen earlier  no acute distress noted  cohen draining clear urine    MEDICATIONS  (STANDING):  atorvastatin 10 milliGRAM(s) Oral at bedtime  dextrose 5%. 1000 milliLiter(s) (50 mL/Hr) IV Continuous <Continuous>  dextrose 5%. 1000 milliLiter(s) (100 mL/Hr) IV Continuous <Continuous>  dextrose 50% Injectable 25 Gram(s) IV Push once  dextrose 50% Injectable 12.5 Gram(s) IV Push once  dextrose 50% Injectable 25 Gram(s) IV Push once  glucagon  Injectable 1 milliGRAM(s) IntraMuscular once  hydrALAZINE 25 milliGRAM(s) Oral three times a day  insulin lispro (ADMELOG) corrective regimen sliding scale   SubCutaneous three times a day before meals  isosorbide   mononitrate ER Tablet (IMDUR) 30 milliGRAM(s) Oral daily  levothyroxine 25 MICROGram(s) Oral daily  loratadine 10 milliGRAM(s) Oral daily  tamsulosin 0.4 milliGRAM(s) Oral at bedtime    MEDICATIONS  (PRN):  acetaminophen     Tablet .. 650 milliGRAM(s) Oral every 6 hours PRN Temp greater or equal to 38C (100.4F), Mild Pain (1 - 3)  aluminum hydroxide/magnesium hydroxide/simethicone Suspension 30 milliLiter(s) Oral every 4 hours PRN Dyspepsia  dextrose Oral Gel 15 Gram(s) Oral once PRN Blood Glucose LESS THAN 70 milliGRAM(s)/deciliter  dicyclomine 10 milliGRAM(s) Oral four times a day before meals PRN pain  melatonin 3 milliGRAM(s) Oral at bedtime PRN Insomnia  ondansetron Injectable 4 milliGRAM(s) IV Push every 8 hours PRN Nausea and/or Vomiting      Allergies    iodine (Anaphylaxis)          Vital Signs Last 24 Hrs  T(C): 36.4 (08 Nov 2022 10:48), Max: 36.8 (08 Nov 2022 02:22)  T(F): 97.6 (08 Nov 2022 10:48), Max: 98.2 (08 Nov 2022 02:22)  HR: 56 (08 Nov 2022 10:48) (48 - 69)  BP: 142/69 (08 Nov 2022 10:48) (138/72 - 150/72)  BP(mean): 94 (07 Nov 2022 16:27) (94 - 103)  RR: 18 (08 Nov 2022 10:48) (17 - 18)  SpO2: 94% (08 Nov 2022 10:48) (94% - 100%)    Parameters below as of 08 Nov 2022 10:48  Patient On (Oxygen Delivery Method): room air        PHYSICAL EXAM:  GENERAL: Debilitated  ENMT: Moist mucous membranes  NECK: Supple, No JVD  NERVOUS SYSTEM:  Alert & Oriented X3, intact and symmetric  CHEST/LUNG: Clear bilaterally  HEART: Regular rate and rhythm; No rub  ABDOMEN: Soft, Nontender, Nondistended; BS+  EXTREMITIES:  2+ Peripheral Pulses, No LE edema  SKIN: No rashes or lesions    LABS:                        11.4   7.41  )-----------( 196      ( 08 Nov 2022 05:25 )             35.1     11-08    139  |  106  |  22.9<H>  ----------------------------<  76  3.7   |  21.0<L>  |  1.97<H>      Creatinine, Serum: 2.28 mg/dL (11.07.22)    Ca    8.4      08 Nov 2022 05:25    TPro  5.4<L>  /  Alb  2.9<L>  /  TBili  0.9  /  DBili  x   /  AST  33  /  ALT  10  /  AlkPhos  52  11-08            RADIOLOGY & ADDITIONAL TESTS:

## 2022-11-09 PROBLEM — K62.89 RECTAL MASS: Status: ACTIVE | Noted: 2022-11-09

## 2022-11-09 LAB
ALBUMIN SERPL ELPH-MCNC: 3 G/DL — LOW (ref 3.3–5.2)
ALP SERPL-CCNC: 54 U/L — SIGNIFICANT CHANGE UP (ref 40–120)
ALT FLD-CCNC: 11 U/L — SIGNIFICANT CHANGE UP
ANION GAP SERPL CALC-SCNC: 12 MMOL/L — SIGNIFICANT CHANGE UP (ref 5–17)
AST SERPL-CCNC: 31 U/L — SIGNIFICANT CHANGE UP
BASOPHILS # BLD AUTO: 0.05 K/UL — SIGNIFICANT CHANGE UP (ref 0–0.2)
BASOPHILS NFR BLD AUTO: 0.8 % — SIGNIFICANT CHANGE UP (ref 0–2)
BILIRUB SERPL-MCNC: 0.7 MG/DL — SIGNIFICANT CHANGE UP (ref 0.4–2)
BUN SERPL-MCNC: 21.6 MG/DL — HIGH (ref 8–20)
CALCIUM SERPL-MCNC: 8.6 MG/DL — SIGNIFICANT CHANGE UP (ref 8.4–10.5)
CEA SERPL-MCNC: 0.6 NG/ML — SIGNIFICANT CHANGE UP (ref 0–3.8)
CHLORIDE SERPL-SCNC: 109 MMOL/L — HIGH (ref 96–108)
CO2 SERPL-SCNC: 20 MMOL/L — LOW (ref 22–29)
CREAT SERPL-MCNC: 1.8 MG/DL — HIGH (ref 0.5–1.3)
EGFR: 36 ML/MIN/1.73M2 — LOW
EOSINOPHIL # BLD AUTO: 0.19 K/UL — SIGNIFICANT CHANGE UP (ref 0–0.5)
EOSINOPHIL NFR BLD AUTO: 3 % — SIGNIFICANT CHANGE UP (ref 0–6)
GLUCOSE SERPL-MCNC: 82 MG/DL — SIGNIFICANT CHANGE UP (ref 70–99)
HCT VFR BLD CALC: 37.5 % — LOW (ref 39–50)
HGB BLD-MCNC: 12.3 G/DL — LOW (ref 13–17)
IMM GRANULOCYTES NFR BLD AUTO: 0.6 % — SIGNIFICANT CHANGE UP (ref 0–0.9)
LYMPHOCYTES # BLD AUTO: 1.07 K/UL — SIGNIFICANT CHANGE UP (ref 1–3.3)
LYMPHOCYTES # BLD AUTO: 16.9 % — SIGNIFICANT CHANGE UP (ref 13–44)
MCHC RBC-ENTMCNC: 30.1 PG — SIGNIFICANT CHANGE UP (ref 27–34)
MCHC RBC-ENTMCNC: 32.8 GM/DL — SIGNIFICANT CHANGE UP (ref 32–36)
MCV RBC AUTO: 91.7 FL — SIGNIFICANT CHANGE UP (ref 80–100)
MONOCYTES # BLD AUTO: 0.83 K/UL — SIGNIFICANT CHANGE UP (ref 0–0.9)
MONOCYTES NFR BLD AUTO: 13.1 % — SIGNIFICANT CHANGE UP (ref 2–14)
NEUTROPHILS # BLD AUTO: 4.16 K/UL — SIGNIFICANT CHANGE UP (ref 1.8–7.4)
NEUTROPHILS NFR BLD AUTO: 65.6 % — SIGNIFICANT CHANGE UP (ref 43–77)
PLATELET # BLD AUTO: 195 K/UL — SIGNIFICANT CHANGE UP (ref 150–400)
POTASSIUM SERPL-MCNC: 3.8 MMOL/L — SIGNIFICANT CHANGE UP (ref 3.5–5.3)
POTASSIUM SERPL-SCNC: 3.8 MMOL/L — SIGNIFICANT CHANGE UP (ref 3.5–5.3)
PROT SERPL-MCNC: 5.4 G/DL — LOW (ref 6.6–8.7)
RBC # BLD: 4.09 M/UL — LOW (ref 4.2–5.8)
RBC # FLD: 13.8 % — SIGNIFICANT CHANGE UP (ref 10.3–14.5)
SODIUM SERPL-SCNC: 141 MMOL/L — SIGNIFICANT CHANGE UP (ref 135–145)
TSH SERPL-MCNC: 4.33 UIU/ML — HIGH (ref 0.27–4.2)
WBC # BLD: 6.34 K/UL — SIGNIFICANT CHANGE UP (ref 3.8–10.5)
WBC # FLD AUTO: 6.34 K/UL — SIGNIFICANT CHANGE UP (ref 3.8–10.5)

## 2022-11-09 PROCEDURE — 99233 SBSQ HOSP IP/OBS HIGH 50: CPT

## 2022-11-09 PROCEDURE — 99222 1ST HOSP IP/OBS MODERATE 55: CPT

## 2022-11-09 PROCEDURE — 99232 SBSQ HOSP IP/OBS MODERATE 35: CPT

## 2022-11-09 RX ADMIN — ATORVASTATIN CALCIUM 10 MILLIGRAM(S): 80 TABLET, FILM COATED ORAL at 21:48

## 2022-11-09 RX ADMIN — Medication 25 MILLIGRAM(S): at 13:25

## 2022-11-09 RX ADMIN — ISOSORBIDE MONONITRATE 30 MILLIGRAM(S): 60 TABLET, EXTENDED RELEASE ORAL at 13:25

## 2022-11-09 RX ADMIN — Medication 25 MICROGRAM(S): at 05:52

## 2022-11-09 RX ADMIN — LORATADINE 10 MILLIGRAM(S): 10 TABLET ORAL at 13:25

## 2022-11-09 RX ADMIN — Medication 25 MILLIGRAM(S): at 21:48

## 2022-11-09 RX ADMIN — TAMSULOSIN HYDROCHLORIDE 0.4 MILLIGRAM(S): 0.4 CAPSULE ORAL at 21:48

## 2022-11-09 RX ADMIN — Medication 25 MILLIGRAM(S): at 05:52

## 2022-11-09 NOTE — PROGRESS NOTE ADULT - ATTENDING COMMENTS
Patient seen and examined this morning. He has a rectal mass likely rectal cancer although biopsy is pending. His abdominal exam is benign. Rectal exam deferred. Hgb is stable. He needs completion of stating with CT chest and pelvic MRI which cannot be done in the hospital. Depending on the staging, may need neoadjuvant chemoradiation fran prior to surgical intervention. I would order the MRI through my office and also have medical and radiation oncology evaluation as outpatient. He is stable to leave from colorectal standpoint and if chest CT is not done as in inpatient, I would obtain outpatient.

## 2022-11-09 NOTE — PROGRESS NOTE ADULT - PROBLEM SELECTOR PLAN 1
Rectal bleeding likely due to rectal mass. S/p Colonoscopy 11/7 revealing large rectal mass involving the dentate line occupying 75% circumference, friable, biopsied. Diverticulosis, diminutive polyps. Tolerating diet. Vitals are stable, no leukocytosis, Hemoglobin stable at 12.3gm.  Colorectal surgery evaluation noted.   Advance diet as tolerated  Continue to trend CBC, transfuse as needed   Await path results, oncology follow up.  Rest of care as per primary team.

## 2022-11-09 NOTE — PROCEDURE NOTE - ADDITIONAL PROCEDURE DETAILS
location identified, draped/prepped sterile technique, blood seen on insertion, dressing applied, flushes easily with great blood return, secured in place, sterile technique and catheter placed

## 2022-11-09 NOTE — PROGRESS NOTE ADULT - NS ATTEND AMEND GEN_ALL_CORE FT
I evaluated this pt. with my NP and agree with the above assessment and management plan. Pt. s/p colonoscopy with biopsies of a rectal mass. Biopsy results pending. CRS follow up. Await biopsy results.
Patient seen and examined.  Patient with rectal mass.  To be evaluated outpatient by colorectal surgery team.
Pt seen and examined with NP    Only small amount of rectal bleeding overnight  hemoglobin stable  Cardio noted reviewed.   colonoscopy tomorrow

## 2022-11-09 NOTE — PROGRESS NOTE ADULT - SUBJECTIVE AND OBJECTIVE BOX
INTERVAL HPI/OVERNIGHT EVENTS:    Patient evaluated at bedside. NAOE. Patient denies N/V/CP/SOB, no subjective fevers or chills. Pain well controlled. Colonoscopy revealing large rectal mass involving the dentate line occupying 75% circumference, friable, biopsied.     MEDICATIONS  (STANDING):  atorvastatin 10 milliGRAM(s) Oral at bedtime  dextrose 5%. 1000 milliLiter(s) (50 mL/Hr) IV Continuous <Continuous>  dextrose 5%. 1000 milliLiter(s) (100 mL/Hr) IV Continuous <Continuous>  dextrose 50% Injectable 25 Gram(s) IV Push once  dextrose 50% Injectable 12.5 Gram(s) IV Push once  dextrose 50% Injectable 25 Gram(s) IV Push once  glucagon  Injectable 1 milliGRAM(s) IntraMuscular once  hydrALAZINE 25 milliGRAM(s) Oral three times a day  insulin lispro (ADMELOG) corrective regimen sliding scale   SubCutaneous three times a day before meals  isosorbide   mononitrate ER Tablet (IMDUR) 30 milliGRAM(s) Oral daily  levothyroxine 25 MICROGram(s) Oral daily  loratadine 10 milliGRAM(s) Oral daily  tamsulosin 0.4 milliGRAM(s) Oral at bedtime    MEDICATIONS  (PRN):  acetaminophen     Tablet .. 650 milliGRAM(s) Oral every 6 hours PRN Temp greater or equal to 38C (100.4F), Mild Pain (1 - 3)  aluminum hydroxide/magnesium hydroxide/simethicone Suspension 30 milliLiter(s) Oral every 4 hours PRN Dyspepsia  dextrose Oral Gel 15 Gram(s) Oral once PRN Blood Glucose LESS THAN 70 milliGRAM(s)/deciliter  dicyclomine 10 milliGRAM(s) Oral four times a day before meals PRN pain  melatonin 3 milliGRAM(s) Oral at bedtime PRN Insomnia  ondansetron Injectable 4 milliGRAM(s) IV Push every 8 hours PRN Nausea and/or Vomiting      Vital Signs Last 24 Hrs  T(C): 36.6 (08 Nov 2022 21:10), Max: 36.6 (08 Nov 2022 05:37)  T(F): 97.9 (08 Nov 2022 21:10), Max: 97.9 (08 Nov 2022 21:10)  HR: 69 (08 Nov 2022 21:10) (53 - 69)  BP: 170/87 (08 Nov 2022 21:10) (130/81 - 170/87)  BP(mean): --  RR: 16 (08 Nov 2022 21:10) (16 - 18)  SpO2: 99% (08 Nov 2022 21:10) (94% - 99%)    Parameters below as of 08 Nov 2022 21:10  Patient On (Oxygen Delivery Method): room air        PHYSICAL EXAM:   GENERAL:  No acute distress  CHEST:  No increased effort, breath sounds clear  HEART:  Regular rhythm, S1, S2,   ABDOMEN:  Soft, non-tender, non-distended, normoactive bowel sounds, no rebound or guarding  EXTREMITIES: No edema  SKIN:  Warm, dry  NEURO:  Calm, cooperative        I&O's Detail    07 Nov 2022 07:01  -  08 Nov 2022 07:00  --------------------------------------------------------  IN:    Oral Fluid: 120 mL  Total IN: 120 mL    OUT:    Indwelling Catheter - Urethral (mL): 500 mL    Voided (mL): 1200 mL  Total OUT: 1700 mL    Total NET: -1580 mL      08 Nov 2022 07:01  -  09 Nov 2022 02:45  --------------------------------------------------------  IN:  Total IN: 0 mL    OUT:    Indwelling Catheter - Urethral (mL): 700 mL  Total OUT: 700 mL    Total NET: -700 mL          LABS:                        11.4   7.41  )-----------( 196      ( 08 Nov 2022 05:25 )             35.1     11-08    139  |  106  |  22.9<H>  ----------------------------<  76  3.7   |  21.0<L>  |  1.97<H>    Ca    8.4      08 Nov 2022 05:25    TPro  5.4<L>  /  Alb  2.9<L>  /  TBili  0.9  /  DBili  x   /  AST  33  /  ALT  10  /  AlkPhos  52  11-08          RADIOLOGY & ADDITIONAL STUDIES: INTERVAL HPI/OVERNIGHT EVENTS:    Patient evaluated at bedside. NAOE. Patient denies N/V/CP/SOB, no subjective fevers or chills. Pain well controlled. Colonoscopy revealing large rectal mass involving the dentate line occupying 75% circumference, friable, biopsied.     MEDICATIONS  (STANDING):  atorvastatin 10 milliGRAM(s) Oral at bedtime  dextrose 5%. 1000 milliLiter(s) (50 mL/Hr) IV Continuous <Continuous>  dextrose 5%. 1000 milliLiter(s) (100 mL/Hr) IV Continuous <Continuous>  dextrose 50% Injectable 25 Gram(s) IV Push once  dextrose 50% Injectable 12.5 Gram(s) IV Push once  dextrose 50% Injectable 25 Gram(s) IV Push once  glucagon  Injectable 1 milliGRAM(s) IntraMuscular once  hydrALAZINE 25 milliGRAM(s) Oral three times a day  insulin lispro (ADMELOG) corrective regimen sliding scale   SubCutaneous three times a day before meals  isosorbide   mononitrate ER Tablet (IMDUR) 30 milliGRAM(s) Oral daily  levothyroxine 25 MICROGram(s) Oral daily  loratadine 10 milliGRAM(s) Oral daily  tamsulosin 0.4 milliGRAM(s) Oral at bedtime    MEDICATIONS  (PRN):  acetaminophen     Tablet .. 650 milliGRAM(s) Oral every 6 hours PRN Temp greater or equal to 38C (100.4F), Mild Pain (1 - 3)  aluminum hydroxide/magnesium hydroxide/simethicone Suspension 30 milliLiter(s) Oral every 4 hours PRN Dyspepsia  dextrose Oral Gel 15 Gram(s) Oral once PRN Blood Glucose LESS THAN 70 milliGRAM(s)/deciliter  dicyclomine 10 milliGRAM(s) Oral four times a day before meals PRN pain  melatonin 3 milliGRAM(s) Oral at bedtime PRN Insomnia  ondansetron Injectable 4 milliGRAM(s) IV Push every 8 hours PRN Nausea and/or Vomiting      Vital Signs Last 24 Hrs  T(C): 36.6 (08 Nov 2022 21:10), Max: 36.6 (08 Nov 2022 05:37)  T(F): 97.9 (08 Nov 2022 21:10), Max: 97.9 (08 Nov 2022 21:10)  HR: 69 (08 Nov 2022 21:10) (53 - 69)  BP: 170/87 (08 Nov 2022 21:10) (130/81 - 170/87)  RR: 16 (08 Nov 2022 21:10) (16 - 18)  SpO2: 99% (08 Nov 2022 21:10) (94% - 99%)    Parameters below as of 08 Nov 2022 21:10  Patient On (Oxygen Delivery Method): room air    PHYSICAL EXAM:   GENERAL:  No acute distress  CHEST:  No increased effort, breath sounds clear  HEART:  Regular rhythm, S1, S2,   ABDOMEN:  Soft, non-tender, non-distended, normoactive bowel sounds, no rebound or guarding  EXTREMITIES: No edema  SKIN:  Warm, dry  NEURO:  Calm, cooperative        I&O's Detail    07 Nov 2022 07:01  -  08 Nov 2022 07:00  --------------------------------------------------------  IN:    Oral Fluid: 120 mL  Total IN: 120 mL    OUT:    Indwelling Catheter - Urethral (mL): 500 mL    Voided (mL): 1200 mL  Total OUT: 1700 mL    Total NET: -1580 mL      08 Nov 2022 07:01  -  09 Nov 2022 02:45  --------------------------------------------------------  IN:  Total IN: 0 mL    OUT:    Indwelling Catheter - Urethral (mL): 700 mL  Total OUT: 700 mL    Total NET: -700 mL    LABS:                        11.4   7.41  )-----------( 196      ( 08 Nov 2022 05:25 )             35.1     11-08    139  |  106  |  22.9<H>  ----------------------------<  76  3.7   |  21.0<L>  |  1.97<H>    Ca    8.4      08 Nov 2022 05:25    TPro  5.4<L>  /  Alb  2.9<L>  /  TBili  0.9  /  DBili  x   /  AST  33  /  ALT  10  /  AlkPhos  52  11-08          RADIOLOGY & ADDITIONAL STUDIES:

## 2022-11-09 NOTE — PROGRESS NOTE ADULT - SUBJECTIVE AND OBJECTIVE BOX
MARIA CASTILLO Patient is a 87y old  Male who presents with a chief complaint of Rectal bleeding (09 Nov 2022 10:34)     HPI:  Patient is a 88 yo M w/ PMH of CAD s/p CABG, Vertigo, DM, HTN, Colon polyps presenting with chief complaint of rectal bleeding. Patient states he was in his normal state of health and started experiencing rectal bleeding starting 2 weeks ago. Symptoms associated with abdominal pain. He also had symptoms of lightheadedness which he states is chronic for 4 years. He noticed his bleeding worsened yesterday, described as mixed with bloody stool. He states he had 2 colonoscopies done many years ago and was told he had "cancerous colon polyps" which were removed. He has not followed up with GI recently. Patient denies fever, chills, chest pain, palpitations, orthopnea, PND, SOB, cough, wheezing, abdominal pain, nausea, vomiting, diarrhea, constipation, melena, hematemesis, urinary complaints, syncope, calf tenderness, paresthesias.  (05 Nov 2022 12:43)    The patient was seen and evaluated - states hes seen a surgeon who told him that he may be a candidate for surgery this morning,   The patient is in no acute distress.  Denied any fever chest pain, palpitations, shortness of breath, abdominal pain, fever, dysuria, cough, edema       I&O's Summary    08 Nov 2022 07:01  -  09 Nov 2022 07:00  --------------------------------------------------------  IN: 0 mL / OUT: 1400 mL / NET: -1400 mL      Allergies    iodine (Anaphylaxis)    Intolerances      HEALTH ISSUES - PROBLEM Dx:  Rectal bleeding          PAST MEDICAL & SURGICAL HISTORY:  Coronary artery disease involving native heart without angina pectoris, unspecified vessel or lesion type      Hypothyroidism, unspecified type      Hyperlipidemia, unspecified hyperlipidemia type      CKD (chronic kidney disease) stage 3, GFR 30-59 ml/min      HTN (hypertension)      Diabetes      S/P CABG (coronary artery bypass graft)              Vital Signs Last 24 Hrs  T(C): 36.6 (09 Nov 2022 09:58), Max: 36.7 (09 Nov 2022 05:51)  T(F): 97.9 (09 Nov 2022 09:58), Max: 98.1 (09 Nov 2022 05:51)  HR: 71 (09 Nov 2022 13:23) (60 - 87)  BP: 131/65 (09 Nov 2022 13:23) (130/81 - 170/87)  BP(mean): --  RR: 18 (09 Nov 2022 09:58) (16 - 18)  SpO2: 100% (09 Nov 2022 09:58) (96% - 100%)    Parameters below as of 09 Nov 2022 09:58  Patient On (Oxygen Delivery Method): room air    T(C): 36.6 (11-09-22 @ 09:58), Max: 36.7 (11-09-22 @ 05:51)  HR: 71 (11-09-22 @ 13:23) (60 - 87)  BP: 131/65 (11-09-22 @ 13:23) (130/81 - 170/87)  RR: 18 (11-09-22 @ 09:58) (16 - 18)  SpO2: 100% (11-09-22 @ 09:58) (96% - 100%)  Wt(kg): --    PHYSICAL EXAM:    GENERAL: NAD  HEAD:  Atraumatic, Normocephalic  EYES: EOMI, PERRL, conjunctiva and sclera clear  ENMT:  Moist mucous membranes,  No lesions  NECK: Supple, No JVD, Normal thyroid  NERVOUS SYSTEM:  Alert & Oriented X3,  Moves upper and lower extremities; CNS-II-XII  CHEST/LUNG: Clear to auscultation bilaterally; No rales, rhonchi, wheezing,   HEART: Regular rate and rhythm; No murmurs,   ABDOMEN: Soft, Nontender, Nondistended; Bowel sounds present  EXTREMITIES:  Peripheral Pulses, No  cyanosis, or edema  SKIN: No rashes or lesions  psychiatry- mood and affect appropriate, Insight and judgement intact     acetaminophen     Tablet .. 650 milliGRAM(s) Oral every 6 hours PRN  aluminum hydroxide/magnesium hydroxide/simethicone Suspension 30 milliLiter(s) Oral every 4 hours PRN  atorvastatin 10 milliGRAM(s) Oral at bedtime  dextrose 5%. 1000 milliLiter(s) IV Continuous <Continuous>  dextrose 5%. 1000 milliLiter(s) IV Continuous <Continuous>  dextrose 50% Injectable 25 Gram(s) IV Push once  dextrose 50% Injectable 12.5 Gram(s) IV Push once  dextrose 50% Injectable 25 Gram(s) IV Push once  dextrose Oral Gel 15 Gram(s) Oral once PRN  dicyclomine 10 milliGRAM(s) Oral four times a day before meals PRN  glucagon  Injectable 1 milliGRAM(s) IntraMuscular once  hydrALAZINE 25 milliGRAM(s) Oral three times a day  insulin lispro (ADMELOG) corrective regimen sliding scale   SubCutaneous three times a day before meals  isosorbide   mononitrate ER Tablet (IMDUR) 30 milliGRAM(s) Oral daily  levothyroxine 25 MICROGram(s) Oral daily  loratadine 10 milliGRAM(s) Oral daily  melatonin 3 milliGRAM(s) Oral at bedtime PRN  ondansetron Injectable 4 milliGRAM(s) IV Push every 8 hours PRN  tamsulosin 0.4 milliGRAM(s) Oral at bedtime      LABS:                          12.3   6.34  )-----------( 195      ( 09 Nov 2022 06:43 )             37.5     11-09    141  |  109<H>  |  21.6<H>  ----------------------------<  82  3.8   |  20.0<L>  |  1.80<H>    Ca    8.6      09 Nov 2022 06:43    TPro  5.4<L>  /  Alb  3.0<L>  /  TBili  0.7  /  DBili  x   /  AST  31  /  ALT  11  /  AlkPhos  54  11-09    LIVER FUNCTIONS - ( 09 Nov 2022 06:43 )  Alb: 3.0 g/dL / Pro: 5.4 g/dL / ALK PHOS: 54 U/L / ALT: 11 U/L / AST: 31 U/L / GGT: x                   CAPILLARY BLOOD GLUCOSE          RADIOLOGY & ADDITIONAL TESTS:      Consultant notes reviewed    Case discussed with consultant/provider/ family /patient

## 2022-11-09 NOTE — PROGRESS NOTE ADULT - ASSESSMENT
ASSESSMENT: Patient is a 87y old male who had a colonoscopy and was found to have an ulcerated, fungating mass 3cm from dentate line 75% circumference. Patient tolerating diet, passing flatus, and having bowel movements. TTE on 10/6 demonstrates 65-70% EF.    PLAN:    - Colorectal surgery will follow pathology results   - Please obtain CEA and CT chest with IV contrast  - Will obtain outpatient Pelvic MRI  - ADAT  - Recommend chemical DVT ppx  - f/u GOC conversation  - rest of care per primary team

## 2022-11-09 NOTE — CHART NOTE - NSCHARTNOTEFT_GEN_A_CORE
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
EP called to r/a asymptomatic SB. Tele reviewed and c/w asymptomatic SB, down to the high 30s mostly when sleep. NO long pauses or high grade AVB. Pls follow prior recs. NO additional testing or treatment is needed at this stage. Call EP again if sustined HR <35 bpm while awake, long (>3.5 seconds) pauses while awake or > 5.5 seconds pauses while asleep, high grade AVB or symptomatic bradycardia. Patient will follow up as o/p
Patient is a 87y old male who had a colonoscopy and was found to have an ulcerated, fungating mass 3cm from dentate line 75% circumference. Dr. Cody discussed with patient colonoscopy findings and gave detailed answers to patient's questions. At this moment no acute surgical intervention is required for the patient.  Recommend:  - Patient will require outpatient MRI pelvis that will give more detailed information about the colonic mass  - f/u Dr. Cody outpatient  - dispo per primary team    Please call colorectal surgery team for any questions or concerns.      Patient seen this morning and plan discussed with chief resident and Attending surgeon - Dr. Cody
Colonoscopy with large rectal mass involving the dentate line occupying 75% circumference, friable, biopsied  Diverticulosis, diminutive polyps     Recommend colorectal surgery evaluation   Advance diet as tolerated  Await path results, oncology follow up
PROM R knee: flexion EOB/prone, extension supine with heel prop  Grade 3 patellar mobs globally  LAD EOB   PROM R knee: flexion EOB LAD EOB   PROM R knee: flexion EOB/supine, knee ext in supine LAD R knee EOB   PROM R knee: flexion EOB/prone, knee ext in
Currently 67% impaired.      Patient Goal: \"be able to ambulate household/community distances without difficulty, go up/down stairs reciprocally\"

## 2022-11-09 NOTE — PROGRESS NOTE ADULT - SUBJECTIVE AND OBJECTIVE BOX
NEPHROLOGY INTERVAL HPI/OVERNIGHT EVENTS:    Examined  Feeling better  Denies HA CP no SOB    MEDICATIONS  (STANDING):  atorvastatin 10 milliGRAM(s) Oral at bedtime  dextrose 5%. 1000 milliLiter(s) (50 mL/Hr) IV Continuous <Continuous>  dextrose 5%. 1000 milliLiter(s) (100 mL/Hr) IV Continuous <Continuous>  dextrose 50% Injectable 25 Gram(s) IV Push once  dextrose 50% Injectable 12.5 Gram(s) IV Push once  dextrose 50% Injectable 25 Gram(s) IV Push once  glucagon  Injectable 1 milliGRAM(s) IntraMuscular once  hydrALAZINE 25 milliGRAM(s) Oral three times a day  insulin lispro (ADMELOG) corrective regimen sliding scale   SubCutaneous three times a day before meals  isosorbide   mononitrate ER Tablet (IMDUR) 30 milliGRAM(s) Oral daily  levothyroxine 25 MICROGram(s) Oral daily  loratadine 10 milliGRAM(s) Oral daily  tamsulosin 0.4 milliGRAM(s) Oral at bedtime    MEDICATIONS  (PRN):  acetaminophen     Tablet .. 650 milliGRAM(s) Oral every 6 hours PRN Temp greater or equal to 38C (100.4F), Mild Pain (1 - 3)  aluminum hydroxide/magnesium hydroxide/simethicone Suspension 30 milliLiter(s) Oral every 4 hours PRN Dyspepsia  dextrose Oral Gel 15 Gram(s) Oral once PRN Blood Glucose LESS THAN 70 milliGRAM(s)/deciliter  dicyclomine 10 milliGRAM(s) Oral four times a day before meals PRN pain  melatonin 3 milliGRAM(s) Oral at bedtime PRN Insomnia  ondansetron Injectable 4 milliGRAM(s) IV Push every 8 hours PRN Nausea and/or Vomiting      Allergies    iodine (Anaphylaxis)    Intolerances        Vital Signs Last 24 Hrs  T(C): 36.6 (2022 15:49), Max: 36.7 (2022 05:51)  T(F): 97.8 (2022 15:49), Max: 98.1 (2022 05:51)  HR: 75 (2022 15:49) (60 - 87)  BP: 125/63 (2022 15:49) (125/63 - 170/87)  BP(mean): 84 (2022 15:49) (84 - 84)  RR: 18 (2022 15:49) (16 - 18)  SpO2: 97% (2022 15:49) (97% - 100%)    Parameters below as of 2022 15:49  Patient On (Oxygen Delivery Method): room air      Daily     Daily Weight in k.1 (2022 06:36)    PHYSICAL EXAM:  GENERAL: NAD  NECK: Supple  NERVOUS SYSTEM:  Alert & Oriented X3  CHEST/LUNG: Clear bilaterally  HEART: Regular rate and rhythm; No rub  ABDOMEN: Soft, Nontender, Nondistended; BS+  EXTREMITIES:  2+ Peripheral Pulses, No LE edema    LABS:                        12.3   6.34  )-----------( 195      ( 2022 06:43 )             37.5         141  |  109<H>  |  21.6<H>  ----------------------------<  82  3.8   |  20.0<L>  |  1.80<H>    Ca    8.6      2022 06:43    TPro  5.4<L>  /  Alb  3.0<L>  /  TBili  0.7  /  DBili  x   /  AST  31  /  ALT  11  /  AlkPhos  54                  RADIOLOGY & ADDITIONAL TESTS:

## 2022-11-09 NOTE — PROCEDURE NOTE - PROCEDURE DATE TIME, MLM
- General Info


Date of Service: 21





- Patient Data


Vital Signs: 


                                Last Vital Signs











Temp  36.5 C   21 07:30


 


Pulse  147   21 07:30


 


Resp  32   21 07:30


 


BP  84/46   21 11:20


 


Pulse Ox      











Weight: 3.16 kg


I&O Last 24 Hours: 


                                 Intake & Output











 03/10/21 03/11/21 03/11/21





 22:59 06:59 14:59


 


Intake Total 25 40 


 


Balance 25 40 











Labs Last 24 Hours: 


                         Laboratory Results - last 24 hr











  21 Range/Units





  11:06 11:15 


 


POC Glucose   92 H  (40-80)  mg/dL


 


Neonat Total Bilirubin  1.7   (0.1-12.0)  mg/dL


 


Neonat Direct Bilirubin  0.3   (0.0-2.0)  mg/dL


 


Neonat Indirect Bili  1.4   (0.0-10.0)  mg/dL














- General/Neuro


Activity: Sleeping, Active


Resting Posture: Flexion





- Exam


Eyes: Bilateral: Normal Inspection, Red Reflex, Positive


Ears: Normal Appearance, Symmetrical


Nose: Normal Inspection


Mouth: Nnormal Inspection, Palate Intact


Chest/Cardiovascular: Normal Appearance, Normal Peripheral Pulses, Regular Heart

Rate, Murmur (no)


Abdomen/GI: Normal Bowel Sounds, No Mass, Soft, Distended (no), Other (No 

h/s'megaly)


Genitalia (Female): Reports: Normal External Exam


Extremities: Normal Inspection, Normal Capillary Refill, Normal Range of Motion


Skin: Dry, Intact, Normal Color, Warm


Physical Findings Comment:: 





Term female infant with strong cry and normal tone. Exhibits developmentally and

socially appropriate behavior. 





- Subjective


Note: 


Uneventful hospitalization so far for this term female infant. She is being 

breast fed with formula to follow and is feeding well. She is voiding and 

stooling normally. I was unable to palpate femoral pulses, 4 extremity BP's 

obtained and all very close and normal, so no problem identified. Seemed a bit 

jittery at time of examination; POC glucose > 90, obviously not a problem. 

Passed 24 hour hearing and CCHD screens, 24 hour bilirubin 1.2. BG is clinically

stable; anticipate discharge in AM tomorrow. 





- Problem List & Annotations


(1) Term  delivered by  section, current hospitalization


SNOMED Code(s): 355414607


   Code(s): Z38.01 - SINGLE LIVEBORN INFANT, DELIVERED BY    Status: 

Acute   Current Visit: Yes   Annotation/Comment:: Clinically stable.    





- Problem List Review


Problem List Initiated/Reviewed/Updated: Yes





- My Orders


Last 24 Hours: 


My Active Orders





21 10:14


 SCREENING (STATE) [POC] Routine 














- Plan


Plan:: 





Routine  care and protocols. 09-Nov-2022 01:41

## 2022-11-09 NOTE — PROGRESS NOTE ADULT - SUBJECTIVE AND OBJECTIVE BOX
Chief Complaint:  Patient is a 87y old  Male who presents with a chief complaint of Rectal bleeding (09 Nov 2022 02:45)      HPI/ 24 hr events: Patient seen and examined at bedside, no overnight events. Pt feeling well this morning. S/p Colonoscopy 11/7 revealing large rectal mass involving the dentate line occupying 75% circumference, friable, biopsied. Diverticulosis, diminutive polyps. Tolerating diet. Vitals are stable, no leukocytosis, Hemoglobin stable at 12.3gm. Denies nausea, vomiting, abdominal pain, chest pain, shortness of breath, hematemesis, hematochezia, melena.       REVIEW OF SYSTEMS:   General: Negative  HEENT: Negative  CV: Negative  Respiratory: Negative  GI: See HPI  : Negative  MSK: Negative  Hematologic: Negative  Skin: Negative    MEDICATIONS:   MEDICATIONS  (STANDING):  atorvastatin 10 milliGRAM(s) Oral at bedtime  dextrose 5%. 1000 milliLiter(s) (50 mL/Hr) IV Continuous <Continuous>  dextrose 5%. 1000 milliLiter(s) (100 mL/Hr) IV Continuous <Continuous>  dextrose 50% Injectable 25 Gram(s) IV Push once  dextrose 50% Injectable 12.5 Gram(s) IV Push once  dextrose 50% Injectable 25 Gram(s) IV Push once  glucagon  Injectable 1 milliGRAM(s) IntraMuscular once  hydrALAZINE 25 milliGRAM(s) Oral three times a day  insulin lispro (ADMELOG) corrective regimen sliding scale   SubCutaneous three times a day before meals  isosorbide   mononitrate ER Tablet (IMDUR) 30 milliGRAM(s) Oral daily  levothyroxine 25 MICROGram(s) Oral daily  loratadine 10 milliGRAM(s) Oral daily  tamsulosin 0.4 milliGRAM(s) Oral at bedtime    MEDICATIONS  (PRN):  acetaminophen     Tablet .. 650 milliGRAM(s) Oral every 6 hours PRN Temp greater or equal to 38C (100.4F), Mild Pain (1 - 3)  aluminum hydroxide/magnesium hydroxide/simethicone Suspension 30 milliLiter(s) Oral every 4 hours PRN Dyspepsia  dextrose Oral Gel 15 Gram(s) Oral once PRN Blood Glucose LESS THAN 70 milliGRAM(s)/deciliter  dicyclomine 10 milliGRAM(s) Oral four times a day before meals PRN pain  melatonin 3 milliGRAM(s) Oral at bedtime PRN Insomnia  ondansetron Injectable 4 milliGRAM(s) IV Push every 8 hours PRN Nausea and/or Vomiting      ALLERGIES:   Allergies    iodine (Anaphylaxis)    Intolerances        VITAL SIGNS:   Vital Signs Last 24 Hrs  T(C): 36.6 (09 Nov 2022 09:58), Max: 36.7 (09 Nov 2022 05:51)  T(F): 97.9 (09 Nov 2022 09:58), Max: 98.1 (09 Nov 2022 05:51)  HR: 87 (09 Nov 2022 09:58) (56 - 87)  BP: 133/76 (09 Nov 2022 09:58) (130/81 - 170/87)  BP(mean): --  RR: 18 (09 Nov 2022 09:58) (16 - 18)  SpO2: 100% (09 Nov 2022 09:58) (94% - 100%)    Parameters below as of 09 Nov 2022 09:58  Patient On (Oxygen Delivery Method): room air      I&O's Summary    08 Nov 2022 07:01  -  09 Nov 2022 07:00  --------------------------------------------------------  IN: 0 mL / OUT: 1400 mL / NET: -1400 mL        PHYSICAL EXAM:   GENERAL:  No acute distress  HEENT:  NC/AT, conjunctiva clear, sclera anicteric  CHEST:  No increased effort, breath sounds clear  HEART:  Regular rhythm, S1, S2,   ABDOMEN:  Soft, non-tender, non-distended, normoactive bowel sounds, no rebound or guarding  EXTREMITIES: No edema  SKIN:  Warm, dry  NEURO:  Calm, cooperative    LABS:  CBC Full  -  ( 09 Nov 2022 06:43 )  WBC Count : 6.34 K/uL  RBC Count : 4.09 M/uL  Hemoglobin : 12.3 g/dL  Hematocrit : 37.5 %  Platelet Count - Automated : 195 K/uL  Mean Cell Volume : 91.7 fl  Mean Cell Hemoglobin : 30.1 pg  Mean Cell Hemoglobin Concentration : 32.8 gm/dL  Auto Neutrophil # : 4.16 K/uL  Auto Lymphocyte # : 1.07 K/uL  Auto Monocyte # : 0.83 K/uL  Auto Eosinophil # : 0.19 K/uL  Auto Basophil # : 0.05 K/uL  Auto Neutrophil % : 65.6 %  Auto Lymphocyte % : 16.9 %  Auto Monocyte % : 13.1 %  Auto Eosinophil % : 3.0 %  Auto Basophil % : 0.8 %    11-09    141  |  109<H>  |  21.6<H>  ----------------------------<  82  3.8   |  20.0<L>  |  1.80<H>    Ca    8.6      09 Nov 2022 06:43    TPro  5.4<L>  /  Alb  3.0<L>  /  TBili  0.7  /  DBili  x   /  AST  31  /  ALT  11  /  AlkPhos  54  11-09    LIVER FUNCTIONS - ( 09 Nov 2022 06:43 )  Alb: 3.0 g/dL / Pro: 5.4 g/dL / ALK PHOS: 54 U/L / ALT: 11 U/L / AST: 31 U/L / GGT: x                             RADIOLOGY & ADDITIONAL STUDIES:      Colonoscopy Report      Date: 11/7/2022 10:03 AM      Patient Name: MARIA CASTILLO        Procedure:      The procedure, indications, preparation and potential complications were    explained to the patient, who indicated understanding and signed the    corresponding consent forms. MAC was administered by the anesthesiologist.    Continuous pulse oximetry and blood pressure monitoring were used throughout the    procedure. Supplemental oxygen was used. Patient was placed in left lateral    decubitus position. Digital exam was normal. The colonoscope was introduced    through the rectum and advanced under direct visualization until cecum was    reached. The appendiceal orifice and the ileocecal valve were identified. The    terminal ileum was identified. Careful visualization was performed as the    instrument was withdrawn. Patient tolerance to procedure was excellent. The    procedure was not difficult.      Findings:        Excavated lesions Multiple non-bleeding diverticula with extensive openings were    seen in the whole colon. Diverticulosis appeared to be severe.        Protruding lesions A single sessile 3 mm non-bleeding polyp of benign appearance    was found in the cecum.A single-piece polypectomy was performed using a cold    snare. The polyp was completely removed.        A single sessile 2 mm non-bleeding polyp of benign appearance was found in the    ascending colon.A single-piece polypectomy was performed using a cold snare. The    polyp was completely removed.        An ulcerated and fungating 75% circumferential bleeding 3 cm past the dentate    line mass of malignant appearance was found in the distal rectum. The scope    traversed the lesion. These findings were suggestive of a malignancy/tumor.    Multiple cold forceps biopsies were performed for histology.        Impressions:    Polyp (3 mm) in the cecum. (Polypectomy).    Polyp (2 mm) in the ascending colon. (Polypectomy).    Severe diverticulosis of the whole colon.    Mass (3 cm) in the distal rectum. (Biopsy).      Plan:    Await pathology results.    Advance diet as tolerated    Please consult colorectal surgery for management of rectal mass    Colonoscopy in 1 year  .

## 2022-11-09 NOTE — PROGRESS NOTE ADULT - ASSESSMENT
88 yo M w/ PMH of CAD s/p CABG, Vertigo, DM, HTN, HLD, Colon polyps presenting with chief complaint of rectal bleeding.    Rectal bleeding-acute blood loss per rectum-likely due to rectal mass.  S/p Colonoscopy yesterday revealing large rectal mass involving the dentate line occupying 75% circumference, friable, biopsied. Diverticulosis, diminutive polyps.   Tolerating diet. Vitals stable, no leukocytosis, Hemoglobin stable at 12.3gm.  Colorectal surgery evaluation -needs completion of stating with CT chest and pelvic MRI which cannot be done in the hospital. Depending on the staging, may need neoadjuvant chemoradiation first prior to surgical intervention- out patient follow up for imaging   Hgb- stable  CBC, transfuse prn    CAD s/p CABG, HTN, HLD  - Hold ASA with bleeding  - Continue home medications  - Hold Atenolol due to bradycardia  had Norvasc in past but had leg edema with it     Bradycardia even off atenolol-last night went to 36 when sleeping  SR/SB with 1st degree AVB and sinus arrhythmia/sinus pauses (all < 3 seconds)  Call EP again if sustained HR <35 bpm while awake, long (>3.5 seconds) pauses while awake or > 5.5 seconds pauses while asleep, high grade AVB or asymptomatic bradycardia. when sleeping  Atropine is at the bedside     SVETA on CKD-1.8- improved with Joiner (component of bladder outlet obstruction present)   Hydralazine 25mg TID for HTN.  Flomax  hold on ACEI/ ARB and diuretics now with elevated Cr    DM2  - Not on any medications, diet controlled  - Sliding scale, FS  - Carb consistent diet    Hypothyroidism- Synthroid    DVT ppx; SCD

## 2022-11-09 NOTE — PROGRESS NOTE ADULT - ASSESSMENT
CKD(IIIb): SVETA vs progression of dz  SVETA +LEACH  Serum Cr 2.2-->1.8   Rectal bleeding w/o significant anemia  CT abdomen:  No hydronephrosis  - avoid potential nephrotoxins  - cont Joiner has good UOP  - Cont Flomax    AM labs will follow

## 2022-11-10 LAB
ALBUMIN SERPL ELPH-MCNC: 3.5 G/DL — SIGNIFICANT CHANGE UP (ref 3.3–5.2)
ALP SERPL-CCNC: 68 U/L — SIGNIFICANT CHANGE UP (ref 40–120)
ALT FLD-CCNC: 13 U/L — SIGNIFICANT CHANGE UP
ANION GAP SERPL CALC-SCNC: 16 MMOL/L — SIGNIFICANT CHANGE UP (ref 5–17)
AST SERPL-CCNC: 33 U/L — SIGNIFICANT CHANGE UP
BASOPHILS # BLD AUTO: 0.07 K/UL — SIGNIFICANT CHANGE UP (ref 0–0.2)
BASOPHILS NFR BLD AUTO: 0.8 % — SIGNIFICANT CHANGE UP (ref 0–2)
BILIRUB SERPL-MCNC: 0.9 MG/DL — SIGNIFICANT CHANGE UP (ref 0.4–2)
BUN SERPL-MCNC: 24.9 MG/DL — HIGH (ref 8–20)
CALCIUM SERPL-MCNC: 9 MG/DL — SIGNIFICANT CHANGE UP (ref 8.4–10.5)
CHLORIDE SERPL-SCNC: 105 MMOL/L — SIGNIFICANT CHANGE UP (ref 96–108)
CO2 SERPL-SCNC: 20 MMOL/L — LOW (ref 22–29)
CREAT SERPL-MCNC: 2.06 MG/DL — HIGH (ref 0.5–1.3)
EGFR: 31 ML/MIN/1.73M2 — LOW
EOSINOPHIL # BLD AUTO: 0.19 K/UL — SIGNIFICANT CHANGE UP (ref 0–0.5)
EOSINOPHIL NFR BLD AUTO: 2.2 % — SIGNIFICANT CHANGE UP (ref 0–6)
GLUCOSE BLDC GLUCOMTR-MCNC: 125 MG/DL — HIGH (ref 70–99)
GLUCOSE BLDC GLUCOMTR-MCNC: 92 MG/DL — SIGNIFICANT CHANGE UP (ref 70–99)
GLUCOSE SERPL-MCNC: 120 MG/DL — HIGH (ref 70–99)
HCT VFR BLD CALC: 39.6 % — SIGNIFICANT CHANGE UP (ref 39–50)
HGB BLD-MCNC: 13.1 G/DL — SIGNIFICANT CHANGE UP (ref 13–17)
IMM GRANULOCYTES NFR BLD AUTO: 0.7 % — SIGNIFICANT CHANGE UP (ref 0–0.9)
LYMPHOCYTES # BLD AUTO: 2 K/UL — SIGNIFICANT CHANGE UP (ref 1–3.3)
LYMPHOCYTES # BLD AUTO: 22.7 % — SIGNIFICANT CHANGE UP (ref 13–44)
MCHC RBC-ENTMCNC: 30 PG — SIGNIFICANT CHANGE UP (ref 27–34)
MCHC RBC-ENTMCNC: 33.1 GM/DL — SIGNIFICANT CHANGE UP (ref 32–36)
MCV RBC AUTO: 90.8 FL — SIGNIFICANT CHANGE UP (ref 80–100)
MONOCYTES # BLD AUTO: 0.94 K/UL — HIGH (ref 0–0.9)
MONOCYTES NFR BLD AUTO: 10.7 % — SIGNIFICANT CHANGE UP (ref 2–14)
NEUTROPHILS # BLD AUTO: 5.55 K/UL — SIGNIFICANT CHANGE UP (ref 1.8–7.4)
NEUTROPHILS NFR BLD AUTO: 62.9 % — SIGNIFICANT CHANGE UP (ref 43–77)
PLATELET # BLD AUTO: 278 K/UL — SIGNIFICANT CHANGE UP (ref 150–400)
POTASSIUM SERPL-MCNC: 4.1 MMOL/L — SIGNIFICANT CHANGE UP (ref 3.5–5.3)
POTASSIUM SERPL-SCNC: 4.1 MMOL/L — SIGNIFICANT CHANGE UP (ref 3.5–5.3)
PROT SERPL-MCNC: 6.4 G/DL — LOW (ref 6.6–8.7)
RBC # BLD: 4.36 M/UL — SIGNIFICANT CHANGE UP (ref 4.2–5.8)
RBC # FLD: 14 % — SIGNIFICANT CHANGE UP (ref 10.3–14.5)
SODIUM SERPL-SCNC: 141 MMOL/L — SIGNIFICANT CHANGE UP (ref 135–145)
WBC # BLD: 8.81 K/UL — SIGNIFICANT CHANGE UP (ref 3.8–10.5)
WBC # FLD AUTO: 8.81 K/UL — SIGNIFICANT CHANGE UP (ref 3.8–10.5)

## 2022-11-10 PROCEDURE — 99233 SBSQ HOSP IP/OBS HIGH 50: CPT

## 2022-11-10 PROCEDURE — 99497 ADVNCD CARE PLAN 30 MIN: CPT

## 2022-11-10 PROCEDURE — 99222 1ST HOSP IP/OBS MODERATE 55: CPT

## 2022-11-10 PROCEDURE — 71250 CT THORAX DX C-: CPT | Mod: 26

## 2022-11-10 RX ORDER — SODIUM CHLORIDE 9 MG/ML
1000 INJECTION, SOLUTION INTRAVENOUS
Refills: 0 | Status: DISCONTINUED | OUTPATIENT
Start: 2022-11-10 | End: 2022-11-11

## 2022-11-10 RX ORDER — POLYETHYLENE GLYCOL 3350 17 G/17G
17 POWDER, FOR SOLUTION ORAL DAILY
Refills: 0 | Status: DISCONTINUED | OUTPATIENT
Start: 2022-11-10 | End: 2022-11-11

## 2022-11-10 RX ADMIN — TAMSULOSIN HYDROCHLORIDE 0.4 MILLIGRAM(S): 0.4 CAPSULE ORAL at 21:15

## 2022-11-10 RX ADMIN — Medication 25 MICROGRAM(S): at 05:44

## 2022-11-10 RX ADMIN — SODIUM CHLORIDE 82 MILLILITER(S): 9 INJECTION, SOLUTION INTRAVENOUS at 14:57

## 2022-11-10 RX ADMIN — Medication 10 MILLIGRAM(S): at 19:42

## 2022-11-10 RX ADMIN — ISOSORBIDE MONONITRATE 30 MILLIGRAM(S): 60 TABLET, EXTENDED RELEASE ORAL at 13:15

## 2022-11-10 RX ADMIN — SODIUM CHLORIDE 82 MILLILITER(S): 9 INJECTION, SOLUTION INTRAVENOUS at 20:20

## 2022-11-10 RX ADMIN — Medication 25 MILLIGRAM(S): at 05:44

## 2022-11-10 RX ADMIN — Medication 25 MILLIGRAM(S): at 13:14

## 2022-11-10 RX ADMIN — LORATADINE 10 MILLIGRAM(S): 10 TABLET ORAL at 13:15

## 2022-11-10 RX ADMIN — ATORVASTATIN CALCIUM 10 MILLIGRAM(S): 80 TABLET, FILM COATED ORAL at 21:15

## 2022-11-10 RX ADMIN — Medication 25 MILLIGRAM(S): at 21:16

## 2022-11-10 NOTE — PROGRESS NOTE ADULT - CONVERSATION DETAILS
"he would not like to be a vegetable" and would want to come home he want to go home - agrees to follow up with oncologist but states he has seen his brother go with cancer and he would rather be home and not have chemo or surgery- discussed with patient that we will get oncologist to weigh in and he can choose to follow up or not out patient he's very worried about pain- also discussed that home at hospice option- states he's very afraid of not being home and being helpless in an institution and not being with his wife- concerned for his son who started a new job

## 2022-11-10 NOTE — PROGRESS NOTE ADULT - SUBJECTIVE AND OBJECTIVE BOX
MARIA CASTILLO Patient is a 87y old  Male who presents with a chief complaint of Hemorrhage of rectum and anus     (10 Nov 2022 14:30)     HPI:  Patient is a 86 yo M w/ PMH of CAD s/p CABG, Vertigo, DM, HTN, Colon polyps presenting with chief complaint of rectal bleeding. Patient states he was in his normal state of health and started experiencing rectal bleeding starting 2 weeks ago. Symptoms associated with abdominal pain. He also had symptoms of lightheadedness which he states is chronic for 4 years. He noticed his bleeding worsened yesterday, described as mixed with bloody stool. He states he had 2 colonoscopies done many years ago and was told he had "cancerous colon polyps" which were removed. He has not followed up with GI recently. Patient denies fever, chills, chest pain, palpitations, orthopnea, PND, SOB, cough, wheezing, abdominal pain, nausea, vomiting, diarrhea, constipation, melena, hematemesis, urinary complaints, syncope, calf tenderness, paresthesias.  (05 Nov 2022 12:43)    The patient was seen and evaluated " i want to know what's happening - no one is telling me anything- can you talk to my wife- " has uncomfortable bowel movements - states its like dirt was thrown in the bowl- never feels like hes not having a BM- discussed with him the rectal mass sensation contributing   The patient is in no acute distress.  Denied any fever chest pain, palpitations, shortness of breath, abdominal pain, fever, dysuria, cough, edema       I&O's Summary    09 Nov 2022 07:01  -  10 Nov 2022 07:00  --------------------------------------------------------  IN: 0 mL / OUT: 1400 mL / NET: -1400 mL      Allergies    iodine (Anaphylaxis)    Intolerances      HEALTH ISSUES - PROBLEM Dx:  Rectal bleeding          PAST MEDICAL & SURGICAL HISTORY:  Coronary artery disease involving native heart without angina pectoris, unspecified vessel or lesion type      Hypothyroidism, unspecified type      Hyperlipidemia, unspecified hyperlipidemia type      CKD (chronic kidney disease) stage 3, GFR 30-59 ml/min      HTN (hypertension)      Diabetes      S/P CABG (coronary artery bypass graft)              Vital Signs Last 24 Hrs  T(C): 36.9 (10 Nov 2022 08:00), Max: 37.1 (09 Nov 2022 20:00)  T(F): 98.4 (10 Nov 2022 08:00), Max: 98.8 (09 Nov 2022 20:00)  HR: 99 (10 Nov 2022 13:11) (84 - 99)  BP: 118/67 (10 Nov 2022 13:11) (115/68 - 137/70)  BP(mean): 84 (10 Nov 2022 04:13) (84 - 89)  RR: 18 (10 Nov 2022 13:11) (18 - 18)  SpO2: 99% (10 Nov 2022 13:11) (95% - 99%)    Parameters below as of 10 Nov 2022 13:11  Patient On (Oxygen Delivery Method): room air    T(C): 36.9 (11-10-22 @ 08:00), Max: 37.1 (11-09-22 @ 20:00)  HR: 99 (11-10-22 @ 13:11) (84 - 99)  BP: 118/67 (11-10-22 @ 13:11) (115/68 - 137/70)  RR: 18 (11-10-22 @ 13:11) (18 - 18)  SpO2: 99% (11-10-22 @ 13:11) (95% - 99%)  Wt(kg): --    PHYSICAL EXAM:    GENERAL: NAD pleasant scared elderly make - very gracious says "i love you "  HEAD:  Atraumatic, Normocephalic  EYES: EOMI, PERRL, conjunctiva and sclera clear  ENMT:  Moist mucous membranes,  No lesions  NECK: Supple, No JVD, Normal thyroid  NERVOUS SYSTEM:  Alert & Oriented X3,  Moves upper and lower extremities; CNS-II-XII  CHEST/LUNG: Clear to auscultation bilaterally; No rales, rhonchi, wheezing,   HEART: Regular rate and rhythm; No murmurs,   ABDOMEN: Soft, Nontender, Nondistended; Bowel sounds present  EXTREMITIES:  Peripheral Pulses, No  cyanosis, or edema  SKIN: No rashes or lesions  psychiatry- mood and affect appropriate, Insight and judgement intact     acetaminophen     Tablet .. 650 milliGRAM(s) Oral every 6 hours PRN  aluminum hydroxide/magnesium hydroxide/simethicone Suspension 30 milliLiter(s) Oral every 4 hours PRN  atorvastatin 10 milliGRAM(s) Oral at bedtime  dextrose 5%. 1000 milliLiter(s) IV Continuous <Continuous>  dextrose 5%. 1000 milliLiter(s) IV Continuous <Continuous>  dextrose 50% Injectable 25 Gram(s) IV Push once  dextrose 50% Injectable 12.5 Gram(s) IV Push once  dextrose 50% Injectable 25 Gram(s) IV Push once  dextrose Oral Gel 15 Gram(s) Oral once PRN  dicyclomine 10 milliGRAM(s) Oral four times a day before meals PRN  glucagon  Injectable 1 milliGRAM(s) IntraMuscular once  hydrALAZINE 25 milliGRAM(s) Oral three times a day  insulin lispro (ADMELOG) corrective regimen sliding scale   SubCutaneous three times a day before meals  isosorbide   mononitrate ER Tablet (IMDUR) 30 milliGRAM(s) Oral daily  levothyroxine 25 MICROGram(s) Oral daily  loratadine 10 milliGRAM(s) Oral daily  melatonin 3 milliGRAM(s) Oral at bedtime PRN  ondansetron Injectable 4 milliGRAM(s) IV Push every 8 hours PRN  polyethylene glycol 3350 17 Gram(s) Oral daily  sodium chloride 0.45% 1000 milliLiter(s) IV Continuous <Continuous>  tamsulosin 0.4 milliGRAM(s) Oral at bedtime      LABS:                          13.1   8.81  )-----------( 278      ( 10 Nov 2022 06:17 )             39.6     11-10    141  |  105  |  24.9<H>  ----------------------------<  120<H>  4.1   |  20.0<L>  |  2.06<H>    Ca    9.0      10 Nov 2022 06:17    TPro  6.4<L>  /  Alb  3.5  /  TBili  0.9  /  DBili  x   /  AST  33  /  ALT  13  /  AlkPhos  68  11-10    LIVER FUNCTIONS - ( 10 Nov 2022 06:17 )  Alb: 3.5 g/dL / Pro: 6.4 g/dL / ALK PHOS: 68 U/L / ALT: 13 U/L / AST: 33 U/L / GGT: x                   CAPILLARY BLOOD GLUCOSE      POCT Blood Glucose.: 92 mg/dL (10 Nov 2022 08:04)      RADIOLOGY & ADDITIONAL TESTS:      Consultant notes reviewed    Case discussed with consultant/provider/ family /patient

## 2022-11-10 NOTE — DIETITIAN NUTRITION RISK NOTIFICATION - ADDITIONAL COMMENTS/DIETITIAN RECOMMENDATIONS
1) Pr refusing nutrition supplements  2) Encourage HBV protein sources  3) Obtain/provide food preferences daily to inc PO  4) Monitor weights daily for trend/accuracy   5) RD to remain available

## 2022-11-10 NOTE — PROGRESS NOTE ADULT - ASSESSMENT
CKD(IIIb): SVETA vs progression of dz  SVETA +LEACH  Serum Cr 2.2-->1.8 --> 2  Will give trial of IVF  MA bicarb added to IVF  Rectal bleeding w/o significant anemia  CT abdomen:  No hydronephrosis  - avoid potential nephrotoxins  - cont Joiner has good UOP  - Cont Flomax    AM labs will follow

## 2022-11-10 NOTE — PROGRESS NOTE ADULT - SUBJECTIVE AND OBJECTIVE BOX
NEPHROLOGY INTERVAL HPI/OVERNIGHT EVENTS:    Examined  Feeling better  Denies HA CP no SOB    MEDICATIONS  (STANDING):  atorvastatin 10 milliGRAM(s) Oral at bedtime  dextrose 5%. 1000 milliLiter(s) (50 mL/Hr) IV Continuous <Continuous>  dextrose 5%. 1000 milliLiter(s) (100 mL/Hr) IV Continuous <Continuous>  dextrose 50% Injectable 25 Gram(s) IV Push once  dextrose 50% Injectable 12.5 Gram(s) IV Push once  dextrose 50% Injectable 25 Gram(s) IV Push once  glucagon  Injectable 1 milliGRAM(s) IntraMuscular once  hydrALAZINE 25 milliGRAM(s) Oral three times a day  insulin lispro (ADMELOG) corrective regimen sliding scale   SubCutaneous three times a day before meals  isosorbide   mononitrate ER Tablet (IMDUR) 30 milliGRAM(s) Oral daily  levothyroxine 25 MICROGram(s) Oral daily  loratadine 10 milliGRAM(s) Oral daily  polyethylene glycol 3350 17 Gram(s) Oral daily  sodium chloride 0.45% 1000 milliLiter(s) (82 mL/Hr) IV Continuous <Continuous>  tamsulosin 0.4 milliGRAM(s) Oral at bedtime    MEDICATIONS  (PRN):  acetaminophen     Tablet .. 650 milliGRAM(s) Oral every 6 hours PRN Temp greater or equal to 38C (100.4F), Mild Pain (1 - 3)  aluminum hydroxide/magnesium hydroxide/simethicone Suspension 30 milliLiter(s) Oral every 4 hours PRN Dyspepsia  dextrose Oral Gel 15 Gram(s) Oral once PRN Blood Glucose LESS THAN 70 milliGRAM(s)/deciliter  dicyclomine 10 milliGRAM(s) Oral four times a day before meals PRN pain  melatonin 3 milliGRAM(s) Oral at bedtime PRN Insomnia  ondansetron Injectable 4 milliGRAM(s) IV Push every 8 hours PRN Nausea and/or Vomiting      Allergies    iodine (Anaphylaxis)    Intolerances        Vital Signs Last 24 Hrs  T(C): 36.7 (10 Nov 2022 15:37), Max: 37.1 (2022 20:00)  T(F): 98.1 (10 Nov 2022 15:37), Max: 98.8 (2022 20:00)  HR: 96 (10 Nov 2022 15:37) (84 - 99)  BP: 146/72 (10 Nov 2022 15:37) (115/68 - 146/72)  BP(mean): 84 (10 Nov 2022 04:13) (84 - 89)  RR: 18 (10 Nov 2022 15:37) (18 - 18)  SpO2: 98% (10 Nov 2022 15:37) (95% - 99%)    Parameters below as of 10 Nov 2022 15:37  Patient On (Oxygen Delivery Method): room air      Daily     Daily Weight in k.6 (10 Nov 2022 04:12)    PHYSICAL EXAM:  GENERAL: NAD  NECK: Supple  NERVOUS SYSTEM:  Alert & Oriented X3  CHEST/LUNG: Clear bilaterally  HEART: Regular rate and rhythm; No rub  ABDOMEN: Soft, Nontender, Nondistended; BS+  EXTREMITIES:  2+ Peripheral Pulses, No LE edema    LABS:                        13.1   8.81  )-----------( 278      ( 10 Nov 2022 06:17 )             39.6     11-10    141  |  105  |  24.9<H>  ----------------------------<  120<H>  4.1   |  20.0<L>  |  2.06<H>    Ca    9.0      10 Nov 2022 06:17    TPro  6.4<L>  /  Alb  3.5  /  TBili  0.9  /  DBili  x   /  AST  33  /  ALT  13  /  AlkPhos  68  11-10                RADIOLOGY & ADDITIONAL TESTS:

## 2022-11-10 NOTE — DIETITIAN INITIAL EVALUATION ADULT - ORAL INTAKE PTA/DIET HISTORY
Pt reports poor PO intake in house and PTA 2/2 abd pain with rectal bleeding. Limited nutrition information obtained, Pt states weight at one time 240lbs and was down to 174lbs with some weight gain but unclear of time frame. PTA weight ~183lbs with suspected weight loss. Pt refusing nutrition supplements, non-receptive to nutrition education at this time, RD to remain available.

## 2022-11-10 NOTE — PROGRESS NOTE ADULT - ASSESSMENT
86 yo M w/ PMH of CAD s/p CABG, Vertigo, DM, HTN, HLD, Colon polyps presenting with chief complaint of rectal bleeding.    Rectal bleeding-acute blood loss per rectum-likely due to rectal mass.  S/p Colonoscopy yesterday revealing large rectal mass involving the dentate line occupying 75% circumference, friable, biopsied. Diverticulosis, diminutive polyps. will discuss with- patient the options - called oncology and discussed [facility]  Colorectal surgery evaluation -needs completion of stating with CT chest and pelvic MRI which cannot be done in the hospital. Depending on the staging, may need neoadjuvant chemoradiation first prior to surgical intervention- out patient follow up for imaging   Tolerating diet. Vitals stable, no leukocytosis, Hemoglobin stable at 12.3gm.Hgb- stable  CBC, transfuse prn    CAD s/p CABG, HTN, HLD  - Hold ASA with bleeding  - Continue home medications  - Hold Atenolol due to bradycardia  had Norvasc in past but had leg edema with it     Bradycardia even off atenolol-last night went to 36 when sleeping  SR/SB with 1st degree AVB and sinus arrhythmia/sinus pauses (all < 3 seconds)  Call EP again if sustained HR <35 bpm while awake, long (>3.5 seconds) pauses while awake or > 5.5 seconds pauses while asleep, high grade AVB or asymptomatic bradycardia. 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  Flomax- discussed with renal will cont monitor inpatietn   hold on ACEI/ ARB and diuretics now with elevated Cr    DM2  - Not on any medications, diet controlled  - Sliding scale, FS  - Carb consistent diet    Hypothyroidism- Synthroid    DVT ppx; SCD     88 yo M w/ PMH of CAD s/p CABG, Vertigo, DM, HTN, HLD, Colon polyps presenting with chief complaint of rectal bleeding.    Rectal bleeding-acute blood loss per rectum-likely due to rectal mass.  S/p Colonoscopy revealing large rectal mass involving the dentate line occupying 75% circumference, friable, biopsied. Diverticulosis, diminutive polyps. will discuss with- patient the options - called oncology and discussed [facility]  Colorectal surgery evaluation -needs completion of stating with CT chest and pelvic MRI which cannot be done in the hospital. Depending on the staging, may need neoadjuvant chemoradiation first prior to surgical intervention- out patient follow up for imaging   Tolerating diet. Vitals stable, no leukocytosis, Hemoglobin stable, transfuse prn  staging images - Ct chest -Multiple bilateral pulmonary micronodules unchanged compared to 11/3/2020.    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  Flomax- discussed with renal will cont monitor inpatietn   hold on ACEI/ ARB and diuretics now with elevated Cr    CAD s/p CABG, HTN, HLD  - Hold ASA with bleeding  - Continue home medications  - Hold Atenolol due to bradycardia  had Norvasc in past but had leg edema with it     Bradycardia even off atenolol-last night went to 36 when sleeping  SR/SB with 1st degree AVB and sinus arrhythmia/sinus pauses (all < 3 seconds)  Call EP again if sustained HR <35 bpm while awake, long (>3.5 seconds) pauses while awake or > 5.5 seconds pauses while asleep, high grade AVB or asymptomatic bradycardia. when sleeping  Atropine is at the bedside     DM2  - Not on any medications, diet controlled  - Sliding scale, FS  - Carb consistent diet    Hypothyroidism- Synthroid    DVT ppx; SCD

## 2022-11-10 NOTE — DIETITIAN INITIAL EVALUATION ADULT - PERTINENT MEDS FT
MEDICATIONS  (STANDING):  atorvastatin 10 milliGRAM(s) Oral at bedtime  dextrose 5%. 1000 milliLiter(s) (50 mL/Hr) IV Continuous <Continuous>  dextrose 5%. 1000 milliLiter(s) (100 mL/Hr) IV Continuous <Continuous>  dextrose 50% Injectable 25 Gram(s) IV Push once  dextrose 50% Injectable 12.5 Gram(s) IV Push once  dextrose 50% Injectable 25 Gram(s) IV Push once  glucagon  Injectable 1 milliGRAM(s) IntraMuscular once  hydrALAZINE 25 milliGRAM(s) Oral three times a day  insulin lispro (ADMELOG) corrective regimen sliding scale   SubCutaneous three times a day before meals  isosorbide   mononitrate ER Tablet (IMDUR) 30 milliGRAM(s) Oral daily  levothyroxine 25 MICROGram(s) Oral daily  loratadine 10 milliGRAM(s) Oral daily  sodium chloride 0.45% 1000 milliLiter(s) (82 mL/Hr) IV Continuous <Continuous>  tamsulosin 0.4 milliGRAM(s) Oral at bedtime    MEDICATIONS  (PRN):  acetaminophen     Tablet .. 650 milliGRAM(s) Oral every 6 hours PRN Temp greater or equal to 38C (100.4F), Mild Pain (1 - 3)  aluminum hydroxide/magnesium hydroxide/simethicone Suspension 30 milliLiter(s) Oral every 4 hours PRN Dyspepsia  dextrose Oral Gel 15 Gram(s) Oral once PRN Blood Glucose LESS THAN 70 milliGRAM(s)/deciliter  dicyclomine 10 milliGRAM(s) Oral four times a day before meals PRN pain  melatonin 3 milliGRAM(s) Oral at bedtime PRN Insomnia  ondansetron Injectable 4 milliGRAM(s) IV Push every 8 hours PRN Nausea and/or Vomiting

## 2022-11-10 NOTE — DIETITIAN INITIAL EVALUATION ADULT - PERTINENT LABORATORY DATA
11-10    141  |  105  |  24.9<H>  ----------------------------<  120<H>  4.1   |  20.0<L>  |  2.06<H>    Ca    9.0      10 Nov 2022 06:17    TPro  6.4<L>  /  Alb  3.5  /  TBili  0.9  /  DBili  x   /  AST  33  /  ALT  13  /  AlkPhos  68  11-10  POCT Blood Glucose.: 92 mg/dL (11-10-22 @ 08:04)  A1C with Estimated Average Glucose Result: 5.4 % (11-06-22 @ 06:55)

## 2022-11-10 NOTE — DIETITIAN INITIAL EVALUATION ADULT - OTHER INFO
86 yo M w/ PMH of CAD s/p CABG, Vertigo, DM, HTN, Colon polyps presenting with chief complaint of rectal bleeding. Patient states he was in his normal state of health and started experiencing rectal bleeding starting 2 weeks ago. Symptoms associated with abdominal pain. He also had symptoms of lightheadedness which he states is chronic for 4 years. He noticed his bleeding worsened yesterday, described as mixed with bloody stool. He states he had 2 colonoscopies done many years ago and was told he had "cancerous colon polyps" which were removed. He has not followed up with GI recently.   Pt s/p colonoscopy in house (11/7) findings of large rectal mass. Colorectal sx following.

## 2022-11-10 NOTE — PROGRESS NOTE ADULT - NUTRITIONAL ASSESSMENT
Gi- "Rectal bleeding likely due to rectal mass. S/p Colonoscopy 11/7 revealing large rectal mass involving the dentate line occupying 75% circumference, friable, biopsied. Diverticulosis, diminutive polyps. Tolerating diet. Vitals are stable, no leukocytosis, Hemoglobin stable at 12.3gm.  Colorectal surgery evaluation noted.   Advance diet as tolerated  Continue to trend CBC, transfuse as needed   Await path results, oncology follow up"    Sx-" He has a rectal mass likely rectal cancer although biopsy is pending. His abdominal exam is benign. Rectal exam deferred. Hgb is stable. He needs completion of stating with CT chest and pelvic MRI which cannot be done in the hospital. Depending on the staging, may need neoadjuvant chemoradiation first prior to surgical intervention. I would order the MRI through my office and also have medical and radiation oncology evaluation as outpatient. He is stable to leave from colorectal standpoint and if chest CT is not done as in inpatient, I would obtain outpatient. ?
This patient has been assessed with a concern for Malnutrition and has been determined to have a diagnosis/diagnoses of Moderate protein-calorie malnutrition.    This patient is being managed with:   Diet Consistent Carbohydrate/No Snacks-  Entered: Nov 8 2022  7:11AM

## 2022-11-10 NOTE — DIETITIAN INITIAL EVALUATION ADULT - ETIOLOGY
related to inability to meet sufficient protein-energy needs in setting of abd pain and rectal bleed 2/2 colon mass

## 2022-11-11 ENCOUNTER — TRANSCRIPTION ENCOUNTER (OUTPATIENT)
Age: 87
End: 2022-11-11

## 2022-11-11 VITALS
TEMPERATURE: 98 F | RESPIRATION RATE: 18 BRPM | OXYGEN SATURATION: 98 % | HEART RATE: 100 BPM | DIASTOLIC BLOOD PRESSURE: 71 MMHG | SYSTOLIC BLOOD PRESSURE: 145 MMHG

## 2022-11-11 LAB
ANION GAP SERPL CALC-SCNC: 16 MMOL/L — SIGNIFICANT CHANGE UP (ref 5–17)
BUN SERPL-MCNC: 23.7 MG/DL — HIGH (ref 8–20)
CALCIUM SERPL-MCNC: 8.5 MG/DL — SIGNIFICANT CHANGE UP (ref 8.4–10.5)
CHLORIDE SERPL-SCNC: 103 MMOL/L — SIGNIFICANT CHANGE UP (ref 96–108)
CO2 SERPL-SCNC: 19 MMOL/L — LOW (ref 22–29)
CREAT SERPL-MCNC: 1.91 MG/DL — HIGH (ref 0.5–1.3)
EGFR: 34 ML/MIN/1.73M2 — LOW
GLUCOSE SERPL-MCNC: 81 MG/DL — SIGNIFICANT CHANGE UP (ref 70–99)
POTASSIUM SERPL-MCNC: 4.1 MMOL/L — SIGNIFICANT CHANGE UP (ref 3.5–5.3)
POTASSIUM SERPL-SCNC: 4.1 MMOL/L — SIGNIFICANT CHANGE UP (ref 3.5–5.3)
SODIUM SERPL-SCNC: 138 MMOL/L — SIGNIFICANT CHANGE UP (ref 135–145)

## 2022-11-11 PROCEDURE — 81001 URINALYSIS AUTO W/SCOPE: CPT

## 2022-11-11 PROCEDURE — 80053 COMPREHEN METABOLIC PANEL: CPT

## 2022-11-11 PROCEDURE — 88341 IMHCHEM/IMCYTCHM EA ADD ANTB: CPT

## 2022-11-11 PROCEDURE — 88305 TISSUE EXAM BY PATHOLOGIST: CPT

## 2022-11-11 PROCEDURE — 84100 ASSAY OF PHOSPHORUS: CPT

## 2022-11-11 PROCEDURE — 74176 CT ABD & PELVIS W/O CONTRAST: CPT | Mod: MA

## 2022-11-11 PROCEDURE — 99285 EMERGENCY DEPT VISIT HI MDM: CPT

## 2022-11-11 PROCEDURE — U0005: CPT

## 2022-11-11 PROCEDURE — 85610 PROTHROMBIN TIME: CPT

## 2022-11-11 PROCEDURE — 82272 OCCULT BLD FECES 1-3 TESTS: CPT

## 2022-11-11 PROCEDURE — 86901 BLOOD TYPING SEROLOGIC RH(D): CPT

## 2022-11-11 PROCEDURE — 93005 ELECTROCARDIOGRAM TRACING: CPT

## 2022-11-11 PROCEDURE — 93306 TTE W/DOPPLER COMPLETE: CPT

## 2022-11-11 PROCEDURE — 82378 CARCINOEMBRYONIC ANTIGEN: CPT

## 2022-11-11 PROCEDURE — U0003: CPT

## 2022-11-11 PROCEDURE — 85730 THROMBOPLASTIN TIME PARTIAL: CPT

## 2022-11-11 PROCEDURE — 71250 CT THORAX DX C-: CPT

## 2022-11-11 PROCEDURE — 82962 GLUCOSE BLOOD TEST: CPT

## 2022-11-11 PROCEDURE — 99231 SBSQ HOSP IP/OBS SF/LOW 25: CPT

## 2022-11-11 PROCEDURE — 99239 HOSP IP/OBS DSCHRG MGMT >30: CPT

## 2022-11-11 PROCEDURE — 86850 RBC ANTIBODY SCREEN: CPT

## 2022-11-11 PROCEDURE — 84443 ASSAY THYROID STIM HORMONE: CPT

## 2022-11-11 PROCEDURE — 83735 ASSAY OF MAGNESIUM: CPT

## 2022-11-11 PROCEDURE — 36415 COLL VENOUS BLD VENIPUNCTURE: CPT

## 2022-11-11 PROCEDURE — 85027 COMPLETE CBC AUTOMATED: CPT

## 2022-11-11 PROCEDURE — 83036 HEMOGLOBIN GLYCOSYLATED A1C: CPT

## 2022-11-11 PROCEDURE — 84484 ASSAY OF TROPONIN QUANT: CPT

## 2022-11-11 PROCEDURE — 85025 COMPLETE CBC W/AUTO DIFF WBC: CPT

## 2022-11-11 PROCEDURE — 86900 BLOOD TYPING SEROLOGIC ABO: CPT

## 2022-11-11 PROCEDURE — 80048 BASIC METABOLIC PNL TOTAL CA: CPT

## 2022-11-11 RX ORDER — HYDRALAZINE HCL 50 MG
1 TABLET ORAL
Qty: 90 | Refills: 0
Start: 2022-11-11 | End: 2022-12-10

## 2022-11-11 RX ORDER — LEVOCETIRIZINE DIHYDROCHLORIDE 0.5 MG/ML
1 SOLUTION ORAL
Qty: 0 | Refills: 0 | DISCHARGE

## 2022-11-11 RX ORDER — LORATADINE 10 MG/1
1 TABLET ORAL
Qty: 0 | Refills: 0 | DISCHARGE
Start: 2022-11-11

## 2022-11-11 RX ORDER — TAMSULOSIN HYDROCHLORIDE 0.4 MG/1
1 CAPSULE ORAL
Qty: 30 | Refills: 0
Start: 2022-11-11 | End: 2022-12-10

## 2022-11-11 RX ORDER — ATENOLOL 25 MG/1
1 TABLET ORAL
Qty: 0 | Refills: 0 | DISCHARGE

## 2022-11-11 RX ORDER — POLYETHYLENE GLYCOL 3350 17 G/17G
17 POWDER, FOR SOLUTION ORAL
Qty: 510 | Refills: 0
Start: 2022-11-11 | End: 2022-12-10

## 2022-11-11 RX ADMIN — Medication 25 MILLIGRAM(S): at 13:30

## 2022-11-11 RX ADMIN — Medication 25 MILLIGRAM(S): at 06:01

## 2022-11-11 RX ADMIN — Medication 25 MICROGRAM(S): at 06:01

## 2022-11-11 RX ADMIN — SODIUM CHLORIDE 82 MILLILITER(S): 9 INJECTION, SOLUTION INTRAVENOUS at 06:02

## 2022-11-11 RX ADMIN — LORATADINE 10 MILLIGRAM(S): 10 TABLET ORAL at 09:34

## 2022-11-11 RX ADMIN — Medication 10 MILLIGRAM(S): at 10:43

## 2022-11-11 RX ADMIN — ISOSORBIDE MONONITRATE 30 MILLIGRAM(S): 60 TABLET, EXTENDED RELEASE ORAL at 09:34

## 2022-11-11 NOTE — PROGRESS NOTE ADULT - REASON FOR ADMISSION
Rectal bleeding

## 2022-11-11 NOTE — CONSULT NOTE ADULT - NEGATIVE PSYCHIATRIC SYMPTOMS
Alert and oriented to person, place and time, memory intact, behavior appropriate to situation, PERRL.
no suicidal ideation

## 2022-11-11 NOTE — DISCHARGE NOTE PROVIDER - CARE PROVIDER_API CALL
Kd Meza)  Radiation Oncology  [address] [place] [address]  Phone: [phone]  Fax: [phone]  Follow Up Time:    Kd Meza)  Radiation Oncology  [address] [place] [address]  Phone: [phone]  Fax: [phone]  Follow Up Time:     Rommel Ozuna)  Urology  [address] [place] [address]  Phone: [phone]  Fax: [phone]  Follow Up Time:

## 2022-11-11 NOTE — DISCHARGE NOTE PROVIDER - NSDCPNSUBOBJ_GEN_ALL_CORE
MARIA CASTILLO Patient is a 87y old  Male who presents with a chief complaint of Rectal bleeding (11 Nov 2022 13:42)     HPI:  Patient is a 86 yo M w/ PMH of CAD s/p CABG, Vertigo, DM, HTN, Colon polyps presenting with chief complaint of rectal bleeding. Patient states he was in his normal state of health and started experiencing rectal bleeding starting 2 weeks ago. Symptoms associated with abdominal pain. He also had symptoms of lightheadedness which he states is chronic for 4 years. He noticed his bleeding worsened yesterday, described as mixed with bloody stool. He states he had 2 colonoscopies done many years ago and was told he had "cancerous colon polyps" which were removed. He has not followed up with GI recently. Patient denies fever, chills, chest pain, palpitations, orthopnea, PND, SOB, cough, wheezing, abdominal pain, nausea, vomiting, diarrhea, constipation, melena, hematemesis, urinary complaints, syncope, calf tenderness, paresthesias.  (05 Nov 2022 12:43)    The patient was seen and evaluated sitting in chair- discussed with patient possibly that radiation will help him have less symptoms and slower progress- he states he can take a cab- called and discussed with his wife on the phone   The patient is in no acute distress.-conversing very pleasant and gracious and sweet  Denied any fever chest pain, palpitations, shortness of breath, abdominal pain, fever, dysuria, cough, edema   Complains of not being able to urinate- discussed paln for Dc homw with home care that can help with foly catheter and follow up with dr aj if needed urology    I&O's Summary    10 Nov 2022 07:01  -  11 Nov 2022 07:00  --------------------------------------------------------  IN: 0 mL / OUT: 1200 mL / NET: -1200 mL    11 Nov 2022 07:01  -  11 Nov 2022 15:39  --------------------------------------------------------  IN: 678 mL / OUT: 545 mL / NET: 133 mL      Allergies    iodine (Anaphylaxis)    Intolerances      HEALTH ISSUES - PROBLEM Dx:  Rectal bleeding          PAST MEDICAL & SURGICAL HISTORY:  Coronary artery disease involving native heart without angina pectoris, unspecified vessel or lesion type      Hypothyroidism, unspecified type      Hyperlipidemia, unspecified hyperlipidemia type      CKD (chronic kidney disease) stage 3, GFR 30-59 ml/min      HTN (hypertension)      Diabetes      S/P CABG (coronary artery bypass graft)              Vital Signs Last 24 Hrs  T(C): 36.9 (11 Nov 2022 08:08), Max: 36.9 (11 Nov 2022 08:08)  T(F): 98.5 (11 Nov 2022 08:08), Max: 98.5 (11 Nov 2022 08:08)  HR: 101 (11 Nov 2022 13:30) (80 - 101)  BP: 130/75 (11 Nov 2022 13:30) (120/64 - 150/68)  BP(mean): --  RR: 18 (11 Nov 2022 08:08) (17 - 18)  SpO2: 97% (11 Nov 2022 08:08) (97% - 98%)    Parameters below as of 11 Nov 2022 08:08  Patient On (Oxygen Delivery Method): room air    T(C): 36.9 (11-11-22 @ 08:08), Max: 36.9 (11-11-22 @ 08:08)  HR: 101 (11-11-22 @ 13:30) (80 - 101)  BP: 130/75 (11-11-22 @ 13:30) (120/64 - 150/68)  RR: 18 (11-11-22 @ 08:08) (17 - 18)  SpO2: 97% (11-11-22 @ 08:08) (97% - 98%)  Wt(kg): --    PHYSICAL EXAM:    GENERAL: NAD, elderly male in chair conversing , cohen drqaing clear  HEAD:  Atraumatic, Normocephalic  EYES: EOMI, PERRL, conjunctiva and sclera clear  ENMT:  Moist mucous membranes,  No lesions  NECK: Supple, No JVD, Normal thyroid  NERVOUS SYSTEM:  Alert & Oriented X3,  Moves upper and lower extremities; CNS-II-XII  CHEST/LUNG: Clear to auscultation bilaterally; No rales, rhonchi, wheezing,   HEART: Regular rate and rhythm; No murmurs,   ABDOMEN: Soft, Nontender, Nondistended; Bowel sounds present  EXTREMITIES:  Peripheral Pulses, No  cyanosis, or edema  SKIN: No rashes or lesions  psychiatry- mood and affect appropriate, Insight and judgement intact     acetaminophen     Tablet .. 650 milliGRAM(s) Oral every 6 hours PRN  aluminum hydroxide/magnesium hydroxide/simethicone Suspension 30 milliLiter(s) Oral every 4 hours PRN  atorvastatin 10 milliGRAM(s) Oral at bedtime  dicyclomine 10 milliGRAM(s) Oral four times a day before meals PRN  hydrALAZINE 25 milliGRAM(s) Oral three times a day  isosorbide   mononitrate ER Tablet (IMDUR) 30 milliGRAM(s) Oral daily  levothyroxine 25 MICROGram(s) Oral daily  loratadine 10 milliGRAM(s) Oral daily  melatonin 3 milliGRAM(s) Oral at bedtime PRN  ondansetron Injectable 4 milliGRAM(s) IV Push every 8 hours PRN  polyethylene glycol 3350 17 Gram(s) Oral daily  sodium chloride 0.45% 1000 milliLiter(s) IV Continuous <Continuous>  tamsulosin 0.4 milliGRAM(s) Oral at bedtime      LABS:                          13.1   8.81  )-----------( 278      ( 10 Nov 2022 06:17 )             39.6     11-11    138  |  103  |  23.7<H>  ----------------------------<  81  4.1   |  19.0<L>  |  1.91<H>    Ca    8.5      11 Nov 2022 06:10    TPro  6.4<L>  /  Alb  3.5  /  TBili  0.9  /  DBili  x   /  AST  33  /  ALT  13  /  AlkPhos  68  11-10    LIVER FUNCTIONS - ( 10 Nov 2022 06:17 )  Alb: 3.5 g/dL / Pro: 6.4 g/dL / ALK PHOS: 68 U/L / ALT: 13 U/L / AST: 33 U/L / GGT: x                   CAPILLARY BLOOD GLUCOSE      POCT Blood Glucose.: 125 mg/dL (10 Nov 2022 17:12)      RADIOLOGY & ADDITIONAL TESTS:      Consultant notes reviewed    Case discussed with consultant/provider/ family /patient

## 2022-11-11 NOTE — CONSULT NOTE ADULT - CONSULT REQUESTED DATE/TIME
08-Nov-2022 15:06
05-Nov-2022 16:09
06-Nov-2022 08:00
07-Nov-2022 11:01
05-Nov-2022 13:49
11-Nov-2022 10:45

## 2022-11-11 NOTE — DISCHARGE NOTE PROVIDER - NSDCHHNEEDSERVICE_GEN_ALL_CORE
Catheter insertion/Medication teaching and assessment/Observation and assessment/Teaching and training

## 2022-11-11 NOTE — CONSULT NOTE ADULT - ASSESSMENT
87-year old gentleman presenting with rectal bleeding, clinical/radiographic findings suggestive of anorectal carcinoma, 3 cm from the dentate line.  Recent colonoscopy/biopsy, pathology pending.

## 2022-11-11 NOTE — DISCHARGE NOTE NURSING/CASE MANAGEMENT/SOCIAL WORK - NSDCPEFALRISK_GEN_ALL_CORE
For information on Fall & Injury Prevention, visit: [email or web address] [place]  [email or web address] [place]  https://www.cdc.gov/steadi/patient.html

## 2022-11-11 NOTE — DISCHARGE NOTE PROVIDER - CARE PROVIDERS DIRECT ADDRESSES
[email or web address]

## 2022-11-11 NOTE — DISCHARGE NOTE PROVIDER - HOSPITAL COURSE
Patient being discharged with Joiner catheter for retention  Catheter may need to be changed or replaced if leaking or dislodged]May irrigate with 30-60 cc ns prn for clogging or sediment 88 yo M w/ PMH of CAD s/p CABG, Vertigo, DM, HTN, HLD, Colon polyps presenting with chief complaint of rectal bleeding.    Rectal bleeding-acute blood loss per rectum-likely due to rectal mass.- Dr Umanzor radiation oncology to consult today- patient and his wife aware - plan for possible radiation palliative - he doesn't   want aggressive chemo or surgery S/p Colonoscopy revealing large rectal mass involving the dentate line occupying 75% circumference, friable, biopsied. Diverticulosis, diminutive polyps. will discuss with- patient the options - called oncology and discussed [facility]  Colorectal surgery evaluation -needs completion of stating with CT chest and pelvic MRI which cannot be done in the hospital. Depending on the staging, may need neoadjuvant chemoradiation first prior to surgical intervention- out patient follow up for imaging   Tolerating diet. Vitals stable, no leukocytosis, Hemoglobin stable, transfuse prn  staging images - Ct chest -Multiple bilateral pulmonary micronodules unchanged compared to 11/3/2020.    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  Flomax- discussed with renal will cont monitor inpatient   hold on ACEI/ ARB and diuretics now with elevated Cr    CAD s/p CABG, HTN, HLD  on Imdur hydralazine regimen  - Hold ASA with bleeding  medications  - Hold Atenolol due to bradycardia  had Norvasc in past but had leg edema with it     Bradycardia even off atenolol-last night went to 36 when sleeping  SR/SB with 1st degree AVB and sinus arrhythmia/sinus pauses (all < 3 seconds)    DM2  - Not on any medications, diet controlled    Hypothyroidism- Synthroid    DVT ppx; SCD          Patient being discharged with Joiner catheter for retention  Catheter may need to be changed or replaced if leaking or dislodged]May irrigate with 30-60 cc ns prn for clogging or sediment      88 yo M w/ PMH of CAD s/p CABG, Vertigo, DM, HTN, HLD, Colon polyps presenting with chief complaint of rectal bleeding.    Rectal bleeding-acute blood loss per rectum-likely due to rectal mass.- Dr Umanzor radiation oncology to consult today- patient and his wife aware - plan for possible radiation palliative - he doesn't   want aggressive chemo or surgery S/p Colonoscopy revealing large rectal mass involving the dentate line occupying 75% circumference, friable, biopsied. Diverticulosis, diminutive polyps. will discuss with- patient the options - called oncology and discussed [facility]  Colorectal surgery evaluation -needs completion of stating with CT chest and pelvic MRI which cannot be done in the hospital. Depending on the staging, may need neoadjuvant chemoradiation first prior to surgical intervention- out patient follow up for imaging   Tolerating diet. Vitals stable, no leukocytosis, Hemoglobin stable, transfuse prn  staging images - Ct chest -Multiple bilateral pulmonary micronodules unchanged compared to 11/3/2020.  Patient met with radiation oncology- Dr Meza- and is agreeable to out patietn follow up in 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  Flomax- discussed with renal will cont monitor inpatient   hold on ACEI/ ARB and diuretics now with elevated Cr    CAD s/p CABG, HTN, HLD  on Imdur hydralazine regimen  - Hold ASA with bleeding  medications  - Hold Atenolol due to bradycardia  had Norvasc in past but had leg edema with it     Bradycardia even off atenolol-last night went to 36 when sleeping  SR/SB with 1st degree AVB and sinus arrhythmia/sinus pauses (all < 3 seconds)    DM2  - Not on any medications, diet controlled    Hypothyroidism- Synthroid    DVT ppx; SCD          Patient being discharged with Joiner catheter for retention  Catheter may need to be changed or replaced if leaking or dislodged]May irrigate with 30-60 cc ns prn for clogging or sediment

## 2022-11-11 NOTE — PROGRESS NOTE ADULT - PROVIDER SPECIALTY LIST ADULT
Cardiology
Cardiology
Colorectal Surgery
Gastroenterology
Hospitalist
Hospitalist
Nephrology
Nephrology
Hospitalist
Hospitalist
Electrophysiology
Hospitalist
Nephrology
Nephrology
Gastroenterology
Gastroenterology

## 2022-11-11 NOTE — PROGRESS NOTE ADULT - SUBJECTIVE AND OBJECTIVE BOX
Patient seen and examined    I&O's Summary    10 Nov 2022 07:01  -  11 Nov 2022 07:00  --------------------------------------------------------  IN: 0 mL / OUT: 1200 mL / NET: -1200 mL        REVIEW OF SYSTEMS:    CONSTITUTIONAL: No F/C  RESPIRATORY: No cough,  No SOB  CARDIOVASCULAR: No CP/palpitations,    GASTROINTESTINAL: No abdominal pain  or NVD   GENITOURINARY: No UTI sx; cohen draining  NEUROLOGICAL: No headaches, NO wk/numbness  MUSCULOSKELETAL:   EXTREMITIES : no swelling,    Vital Signs Last 24 Hrs  T(C): 36.9 (11 Nov 2022 08:08), Max: 36.9 (11 Nov 2022 08:08)  T(F): 98.5 (11 Nov 2022 08:08), Max: 98.5 (11 Nov 2022 08:08)  HR: 101 (11 Nov 2022 13:30) (80 - 101)  BP: 130/75 (11 Nov 2022 13:30) (120/64 - 150/68)  BP(mean): --  RR: 18 (11 Nov 2022 08:08) (17 - 18)  SpO2: 97% (11 Nov 2022 08:08) (97% - 98%)    Parameters below as of 11 Nov 2022 08:08  Patient On (Oxygen Delivery Method): room air        PHYSICAL EXAM:    GENERAL: NAD,   EYES:  conjunctiva and sclera clear  NECK: Supple, No JVD/Bruit  NERVOUS SYSTEM:  A/O x3,   CHEST:  No rales or rhonchi  HEART:  RRR, No murmur  ABDOMEN: Soft, NT/ND BS+  EXTREMITIES:  No Edema;  SKIN: No rashes    LABS:                          13.1   8.81  )-----------( 278      ( 10 Nov 2022 06:17 )             39.6     11-11    138  |  103  |  23.7<H>  ----------------------------<  81  4.1   |  19.0<L>  |  1.91<H>    Ca    8.5      11 Nov 2022 06:10    TPro  6.4<L>  /  Alb  3.5  /  TBili  0.9  /  DBili  x   /  AST  33  /  ALT  13  /  AlkPhos  68  11-10      MEDICATIONS  (STANDING):  acetaminophen     Tablet .. PRN  aluminum hydroxide/magnesium hydroxide/simethicone Suspension PRN  atorvastatin  dicyclomine PRN  hydrALAZINE  isosorbide   mononitrate ER Tablet (IMDUR)  levothyroxine  loratadine  melatonin PRN  ondansetron Injectable PRN  polyethylene glycol 3350  sodium chloride 0.45%  tamsulosin

## 2022-11-11 NOTE — DISCHARGE NOTE PROVIDER - NSDCMRMEDTOKEN_GEN_ALL_CORE_FT
dicyclomine 10 mg oral capsule: 1 cap(s) orally 2 times a day (with meals), As Needed -pain   febuxostat 40 mg oral tablet: 1 tab(s) orally once a day  cohen : Patient being discharged with Cohen catheter for retention  Catheter may need to be changed or replaced if leaking or dislodged]May irrigate with 30-60 cc ns prn for clogging or sediment   hydrALAZINE 25 mg oral tablet: 1 tab(s) orally 3 times a day  isosorbide mononitrate 30 mg oral tablet, extended release: 1 tab(s) orally once a day (in the morning)  levothyroxine 25 mcg (0.025 mg) oral tablet: 1 tab(s) orally once a day  loratadine 10 mg oral tablet: 1 tab(s) orally once a day  polyethylene glycol 3350 oral powder for reconstitution: 17 gram(s) orally once a day  tamsulosin 0.4 mg oral capsule: 1 cap(s) orally once a day (at bedtime)

## 2022-11-11 NOTE — PROGRESS NOTE ADULT - ASSESSMENT
88yo M PMH +CAD/CABG, DM, HTN, Hypothyroidism who presented to [facility] with rectal bleeding which started approx 2 weeks ago and progressively worsened.  We are called regarding abnormal kidney function.  Pt is aware of CKD and has been seen in our office in the past by Dr Mittal. Pt notes difficulty with urination, hesitancy.  Called our office - last seen in 2010  Baseline creatinine NA    CKD(IIIb): SVETA vs progression of dz  SVETA +LEACH  Serum Cr 2.2-->1.8 --> 2>1.91  Will give trial of IVF  MA bicarb added to IVF - wants IVFs off - shall do tomorrow morning    Rectal bleeding w/o significant anemia Hb 13  CT abdomen:  No hydronephrosis  - avoid potential nephrotoxins  - cont Joiner has good UOP  - Cont Flomax    AM labs will follow

## 2022-11-11 NOTE — DISCHARGE NOTE NURSING/CASE MANAGEMENT/SOCIAL WORK - PATIENT PORTAL LINK FT
You can access the FollowMyHealth Patient Portal offered by [facility] by registering at the following website: [email or web address]. By joining [employer]’s FollowMyHealth portal, you will also be able to view your health information using other applications (apps) compatible with our system.

## 2022-11-11 NOTE — CONSULT NOTE ADULT - SUBJECTIVE AND OBJECTIVE BOX
Patient is a 87y old  Male who presents with a chief complaint of Rectal bleeding (11 Nov 2022 13:42)      HPI:  Patient is a 86 yo M w/ PMH of CAD s/p CABG, Vertigo, DM, HTN, Colon polyps presenting with chief complaint of rectal bleeding. Patient states he was in his normal state of health and started experiencing rectal bleeding starting 2 weeks ago. Symptoms associated with abdominal pain. He also had symptoms of lightheadedness which he states is chronic for 4 years. He noticed his bleeding worsened yesterday, described as mixed with bloody stool. He states he had 2 colonoscopies done many years ago and was told he had "cancerous colon polyps" which were removed. He has not followed up with GI recently. Patient denies fever, chills, chest pain, palpitations, orthopnea, PND, SOB, cough, wheezing, abdominal pain, nausea, vomiting, diarrhea, constipation, melena, hematemesis, urinary complaints, syncope, calf tenderness, paresthesias.  (05 Nov 2022 12:43)    < from: CT Abdomen and Pelvis No Cont (11.05.22 @ 09:57) >  BOWEL:  Colonic diverticulosis, without CT evidence of diverticulitis.  Circumferential posterior rectal wall thickening. There is minimal stranding in the perirectal fat.  Possible tiny, 3 mm lymph node in the left mesorectum.  Probable 8 mm mesenteric lymph node medial left mid abdomen at the level of the kidney (3:65).  RETROPERITONEUM/LYMPH NODES: Small subcentimeter short axis left common iliac chain lymph nodes.    11.07.22 colonoscopy showed large rectal mass involving the dentate line, occupying 75% circumference, friable.  Biopsied; results pending.    < from: CT Chest No Cont (11.10.22 @ 11:23) >  Multiple bilateral pulmonary micronodules unchanged compared to 11/3/2020.      PAST MEDICAL & SURGICAL HISTORY:  Coronary artery disease involving native heart without angina pectoris, unspecified vessel or lesion type  Hypothyroidism, unspecified type  Hyperlipidemia, unspecified hyperlipidemia type  CKD (chronic kidney disease) stage 3, GFR 30-59 ml/min  HTN (hypertension)  Diabetes  S/P CABG (coronary artery bypass graft)      FAMILY HISTORY:  No pertinent family history in first degree relatives      ALLERGIES  iodine (Anaphylaxis)      MEDICATIONS  (STANDING):  atorvastatin 10 milliGRAM(s) Oral at bedtime  hydrALAZINE 25 milliGRAM(s) Oral three times a day  isosorbide   mononitrate ER Tablet (IMDUR) 30 milliGRAM(s) Oral daily  levothyroxine 25 MICROGram(s) Oral daily  loratadine 10 milliGRAM(s) Oral daily  polyethylene glycol 3350 17 Gram(s) Oral daily  sodium chloride 0.45% 1000 milliLiter(s) (82 mL/Hr) IV Continuous <Continuous>  tamsulosin 0.4 milliGRAM(s) Oral at bedtime    MEDICATIONS  (PRN):  acetaminophen     Tablet .. 650 milliGRAM(s) Oral every 6 hours PRN Temp greater or equal to 38C (100.4F), Mild Pain (1 - 3)  aluminum hydroxide/magnesium hydroxide/simethicone Suspension 30 milliLiter(s) Oral every 4 hours PRN Dyspepsia  dicyclomine 10 milliGRAM(s) Oral four times a day before meals PRN pain  melatonin 3 milliGRAM(s) Oral at bedtime PRN Insomnia  ondansetron Injectable 4 milliGRAM(s) IV Push every 8 hours PRN Nausea and/or Vomiting      PHYSICAL EXAM:  Vital Signs Last 24 Hrs  T(C): 36.9 (11 Nov 2022 08:08), Max: 36.9 (11 Nov 2022 08:08)  T(F): 98.5 (11 Nov 2022 08:08), Max: 98.5 (11 Nov 2022 08:08)  HR: 101 (11 Nov 2022 13:30) (80 - 101)  BP: 130/75 (11 Nov 2022 13:30) (120/64 - 150/68)  BP(mean): --  RR: 18 (11 Nov 2022 08:08) (17 - 18)  SpO2: 97% (11 Nov 2022 08:08) (97% - 98%)    Parameters below as of 11 Nov 2022 08:08  Patient On (Oxygen Delivery Method): room air        IMAGING/LABS/PATHOLOGY: I have personally reviewed the relevant labs, pathology, and imaging as noted in the HPI.  In addition,                        13.1   8.81  )-----------( 278      ( 10 Nov 2022 06:17 )             39.6     11-11    138  |  103  |  23.7<H>  ----------------------------<  81  4.1   |  19.0<L>  |  1.91<H>    Ca    8.5      11 Nov 2022 06:10    TPro  6.4<L>  /  Alb  3.5  /  TBili  0.9  /  DBili  x   /  AST  33  /  ALT  13  /  AlkPhos  68  11-10

## 2022-11-11 NOTE — DISCHARGE NOTE PROVIDER - NSDCCPCAREPLAN_GEN_ALL_CORE_FT
PRINCIPAL DISCHARGE DIAGNOSIS  Diagnosis: Rectal bleeding  Assessment and Plan of Treatment: recatl mass , bradycardio, renal falure

## 2022-11-11 NOTE — DISCHARGE NOTE PROVIDER - PROVIDER TOKENS
PROVIDER:[TOKEN:[[id number]]] PROVIDER:[TOKEN:[[id number]]],PROVIDER:[TOKEN:[[id number]]]

## 2022-11-11 NOTE — CONSULT NOTE ADULT - PROBLEM SELECTOR RECOMMENDATION 9
- Rectal bleeding has stabilized.  - Pending pathology from colonoscopy; concerning for rectal carcinoma.  - MRI pelvis for staging  - GOC conversation; patient wishes to return home, and has a  who brings him to medical appointments.  Denies difficulty or hardship with outpatient follow up, but appreciate Care Coordination assistance.  - Indwelling cohen, and patient expressed need for home care services to manage.  - Discussed role of radiation in management of anorectal cancers, including role in definitive and/or palliative treatments.  - [facility] consult to discuss possible systemic therapy options; patient receptive to idea.  - Multi-disciplinary discussion regarding treatment recommendations after pathology.  - May consider emergent RT if uncontrollable bleeding, but no plans for inpatient RT at this time.

## 2022-11-15 LAB — SURGICAL PATHOLOGY STUDY: SIGNIFICANT CHANGE UP

## 2022-11-15 RX ORDER — HYDRALAZINE HYDROCHLORIDE 25 MG/1
25 TABLET ORAL
Refills: 0 | Status: ACTIVE | COMMUNITY

## 2022-11-15 RX ORDER — LEVOTHYROXINE SODIUM 0.03 MG/1
25 TABLET ORAL
Refills: 0 | Status: ACTIVE | COMMUNITY

## 2022-11-15 RX ORDER — LORATADINE 10 MG/1
10 TABLET ORAL
Refills: 0 | Status: ACTIVE | COMMUNITY

## 2022-11-15 RX ORDER — TAMSULOSIN HYDROCHLORIDE 0.4 MG/1
0.4 CAPSULE ORAL
Refills: 0 | Status: ACTIVE | COMMUNITY

## 2022-11-15 NOTE — CDI QUERY NOTE - NSCDIOTHERTXTBX_GEN_ALL_CORE_HH
SUPPORTING DOCUMENTATION / EVIDENCE:  Admitted with rectal bleed  Colonoscopy done and biopsies taken  Path report = malignant neoplasm rectal mass, tubular adenoma of cecum and ascending colon polyps    PATH:  Surgical Pathology Report (11.07.22 @ 17:00)    Surgical Pathology Report:   ACCESSION No:  [id number]  Collected Date/Time:                   11/7/2022 17:00 EST  Received Date/Time:                    11/8/2022 09:48 EST    Surgical Pathology Report - Auth (Verified)  Specimen(s) Submitted  1  Cecal polyp  2  Ascending colon polyp  3  Rectal mass biopsy    Final Diagnosis  1  Cecal polyp:  - Tubular adenoma.    2  Ascending colon polyp:  - Tubular adenoma.    3  Rectal mass biopsy:  - Diffuse malignant neoplasm, pending immunohistochemical stains for  further characterization.      Clinical Information  Pre- rectal bleeding, post- diverticulosis, rectal mass, polyps      Please clarify, in addendum to DC summary, the pathology findings on all polypectomies, if clinically significant       Thank you

## 2022-11-15 NOTE — HISTORY OF PRESENT ILLNESS
[FreeTextEntry1] : 87 year old man seen while admitted in [facility] returns today for reevaluation regarding rectal carcinoma. \par \par Recall, he went to the ED at [facility] on 11/5/22 with a 2 week history of rectal bleeding and abdominal pain\par \par 11/5/22 CT abd/pel :\par BOWEL:\par  Colonic diverticulosis, without CT evidence of diverticulitis. Circumferential posterior rectal wall thickening. There is minimal stranding in the perirectal fat. Possible tiny, 3 mm lymph node in the left mesorectum.  Appendix within normal limits.\par PERITONEUM: No ascites. Presacral stranding. Faint haziness at the root of the small bowel mesentery. Possible tiny calcified lymph node anterior mesentery. Probable 8 mm mesenteric lymph node medial left mid abdomen at the level of the kidney (3:65).\par RETROPERITONEUM/LYMPH NODES:\par Small subcentimeter short axis left common iliac chain lymph nodes. \par ABDOMINAL WALL:\par Fat-containing left inguinal lymph node. 3.5 cm subcutaneous fluid collection right inguinal region. Small bilateral inguinal lymph nodes.\par \par He met with DR Haynes (GI) \par \par 11/7/22 Colonoscopy: \par An ulcerated and fungating 75% circumferential bleeding 3 cm past the dentate line mass of malignant appearance was found in the distal rectum. The scope traversed the lesion. These findings were suggestive of a malignancy/tumor. Multiple cold forceps biopsies were performed for histology.\par Path:\par \par 11/10/22 CT chest:\par IMPRESSION:.\par Multiple bilateral pulmonary micronodules unchanged compared to 11/3/2020.\par No new nodule.\par \par He was discharged on 11/11/22

## 2022-11-21 ENCOUNTER — APPOINTMENT (OUTPATIENT)
Dept: HEMATOLOGY ONCOLOGY | Facility: CLINIC | Age: 87
End: 2022-11-21

## 2022-11-22 ENCOUNTER — APPOINTMENT (OUTPATIENT)
Dept: RADIATION ONCOLOGY | Facility: CLINIC | Age: 87
End: 2022-11-22

## 2023-02-22 NOTE — PHYSICAL THERAPY INITIAL EVALUATION ADULT - GENERAL OBSERVATIONS, REHAB EVAL
Discharged in stable condition.   Pt found lying in bed without c/o, pleasant and engaging and agreeable to PT

## 2023-05-24 NOTE — PROCEDURE NOTE - NSTIMEOUT_GEN_A_CORE
Patient's first and last name, , procedure, and correct site confirmed prior to the start of procedure. 4 = No assist / stand by assistance

## 2024-02-06 NOTE — ED CDU PROVIDER SUBSEQUENT DAY NOTE - TEMPLATE, MLM
Medication: rosuvastatin (CRESTOR) 10 MG tablet  passed protocol.   Last office visit date: 12/22/23  Next appointment scheduled?: Yes 3/22/24  Number of refills given: 2    
Neuro
Abdominal Pain, N/V/D

## 2025-03-10 NOTE — ED ADULT NURSE NOTE - SUICIDE SCREENING DEPRESSION
Dr. Cramer's office will call to make an appointment with you. If you don't hear back in about a week, please call their office. 
Negative

## 2025-05-02 NOTE — ED CDU PROVIDER SUBSEQUENT DAY NOTE - HISTORY
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

## (undated) DEVICE — MASK PROC EAR LOOP

## (undated) DEVICE — DRSG CURITY GAUZE SPONGE 4 X 4" 12-PLY NON-STERILE

## (undated) DEVICE — SOL BAG NS 0.9% 1000ML

## (undated) DEVICE — SYR LUER SLIP TIP 50CC

## (undated) DEVICE — SYR IV FLUSH SALINE 10ML 30/TY

## (undated) DEVICE — SENSOR O2 FINGER ADULT

## (undated) DEVICE — TUBING IV EXTENSION MACRO W CLAVE 7"

## (undated) DEVICE — SOL IRR BAG H2O 1000ML

## (undated) DEVICE — PACK IV START WITH CHG

## (undated) DEVICE — GOWN IMPERV XL

## (undated) DEVICE — DENTURE CUP PINK

## (undated) DEVICE — TUBING ALARIS PUMP MODULE NON-DEHP

## (undated) DEVICE — FORCEP RADIAL JAW 4 W NDL 2.4MM 2.8MM 240CM ORANGE DISP

## (undated) DEVICE — SYR SLIP 10CC

## (undated) DEVICE — CATH IV SAFE BC 22G X 1" (BLUE)

## (undated) DEVICE — UNDERPAD LINEN SAVER 23 X 36"

## (undated) DEVICE — DRSG 2X2